# Patient Record
Sex: FEMALE | Race: WHITE | NOT HISPANIC OR LATINO | Employment: FULL TIME | URBAN - METROPOLITAN AREA
[De-identification: names, ages, dates, MRNs, and addresses within clinical notes are randomized per-mention and may not be internally consistent; named-entity substitution may affect disease eponyms.]

---

## 2017-07-12 ENCOUNTER — TRANSFERRED RECORDS (OUTPATIENT)
Dept: HEALTH INFORMATION MANAGEMENT | Facility: CLINIC | Age: 42
End: 2017-07-12

## 2017-09-15 DIAGNOSIS — E03.9 HYPOTHYROIDISM, UNSPECIFIED TYPE: ICD-10-CM

## 2017-09-15 RX ORDER — LEVOTHYROXINE SODIUM 175 UG/1
TABLET ORAL
Qty: 30 TABLET | Refills: 0 | Status: SHIPPED | OUTPATIENT
Start: 2017-09-15 | End: 2017-10-26

## 2017-09-15 NOTE — TELEPHONE ENCOUNTER
Medication is being filled for 1 time refill only due to:  Patient needs to be seen because due for physical and labs. Recent visits for acute issues..   Silvana MARES RN

## 2017-09-15 NOTE — TELEPHONE ENCOUNTER
levothyroxine (SYNTHROID/LEVOTHROID) 175 MCG tablet     Last Written Prescription Date: 02/14/17  Last Quantity: 90, # refills: 1  Last Office Visit with [facility], [facility] or [facility] prescribing provider: 10/26/16        TSH   Date Value Ref Range Status   09/14/2016 1.14 0.40 - 4.00 mU/L Final

## 2017-10-26 DIAGNOSIS — E03.9 HYPOTHYROIDISM, UNSPECIFIED TYPE: ICD-10-CM

## 2017-10-26 RX ORDER — LEVOTHYROXINE SODIUM 175 UG/1
TABLET ORAL
Qty: 30 TABLET | Refills: 0 | Status: SHIPPED | OUTPATIENT
Start: 2017-10-26 | End: 2017-11-21

## 2017-11-01 ENCOUNTER — TRANSFERRED RECORDS (OUTPATIENT)
Dept: FAMILY MEDICINE | Facility: CLINIC | Age: 42
End: 2017-11-01

## 2017-11-01 LAB — PAP SMEAR - HIM PATIENT REPORTED: NEGATIVE

## 2017-11-17 ENCOUNTER — OFFICE VISIT (OUTPATIENT)
Dept: FAMILY MEDICINE | Facility: CLINIC | Age: 42
End: 2017-11-17
Payer: COMMERCIAL

## 2017-11-17 VITALS
DIASTOLIC BLOOD PRESSURE: 72 MMHG | WEIGHT: 154.1 LBS | HEART RATE: 80 BPM | BODY MASS INDEX: 27.3 KG/M2 | SYSTOLIC BLOOD PRESSURE: 98 MMHG | HEIGHT: 63 IN | TEMPERATURE: 98.2 F

## 2017-11-17 DIAGNOSIS — Z00.00 ENCOUNTER FOR ROUTINE ADULT HEALTH EXAMINATION WITHOUT ABNORMAL FINDINGS: Primary | ICD-10-CM

## 2017-11-17 DIAGNOSIS — E03.9 HYPOTHYROIDISM, UNSPECIFIED TYPE: ICD-10-CM

## 2017-11-17 DIAGNOSIS — R05.9 COUGH: ICD-10-CM

## 2017-11-17 DIAGNOSIS — M30.0 POLYARTERITIS NODOSA (H): ICD-10-CM

## 2017-11-17 DIAGNOSIS — C67.9 MALIGNANT NEOPLASM OF URINARY BLADDER, UNSPECIFIED SITE (H): ICD-10-CM

## 2017-11-17 DIAGNOSIS — R82.90 NONSPECIFIC FINDING ON EXAMINATION OF URINE: ICD-10-CM

## 2017-11-17 DIAGNOSIS — L30.9 DERMATITIS: ICD-10-CM

## 2017-11-17 LAB
ALBUMIN UR-MCNC: NEGATIVE MG/DL
APPEARANCE UR: CLEAR
BACTERIA #/AREA URNS HPF: ABNORMAL /HPF
BILIRUB UR QL STRIP: NEGATIVE
COLOR UR AUTO: YELLOW
GLUCOSE UR STRIP-MCNC: NEGATIVE MG/DL
HGB UR QL STRIP: ABNORMAL
KETONES UR STRIP-MCNC: NEGATIVE MG/DL
LEUKOCYTE ESTERASE UR QL STRIP: ABNORMAL
NITRATE UR QL: NEGATIVE
NON-SQ EPI CELLS #/AREA URNS LPF: ABNORMAL /LPF
PH UR STRIP: 6 PH (ref 5–7)
RBC #/AREA URNS AUTO: ABNORMAL /HPF
SOURCE: ABNORMAL
SP GR UR STRIP: 1.02 (ref 1–1.03)
T3FREE SERPL-MCNC: 2.6 PG/ML (ref 2.3–4.2)
UROBILINOGEN UR STRIP-ACNC: 0.2 EU/DL (ref 0.2–1)
WBC #/AREA URNS AUTO: ABNORMAL /HPF

## 2017-11-17 PROCEDURE — 84481 FREE ASSAY (FT-3): CPT | Performed by: FAMILY MEDICINE

## 2017-11-17 PROCEDURE — 80061 LIPID PANEL: CPT | Performed by: FAMILY MEDICINE

## 2017-11-17 PROCEDURE — 81001 URINALYSIS AUTO W/SCOPE: CPT | Performed by: FAMILY MEDICINE

## 2017-11-17 PROCEDURE — 84443 ASSAY THYROID STIM HORMONE: CPT | Performed by: FAMILY MEDICINE

## 2017-11-17 PROCEDURE — 90471 IMMUNIZATION ADMIN: CPT | Performed by: FAMILY MEDICINE

## 2017-11-17 PROCEDURE — 82306 VITAMIN D 25 HYDROXY: CPT | Performed by: FAMILY MEDICINE

## 2017-11-17 PROCEDURE — 99396 PREV VISIT EST AGE 40-64: CPT | Mod: 25 | Performed by: FAMILY MEDICINE

## 2017-11-17 PROCEDURE — 84439 ASSAY OF FREE THYROXINE: CPT | Performed by: FAMILY MEDICINE

## 2017-11-17 PROCEDURE — 80053 COMPREHEN METABOLIC PANEL: CPT | Performed by: FAMILY MEDICINE

## 2017-11-17 PROCEDURE — 87086 URINE CULTURE/COLONY COUNT: CPT | Performed by: FAMILY MEDICINE

## 2017-11-17 PROCEDURE — 90686 IIV4 VACC NO PRSV 0.5 ML IM: CPT | Performed by: FAMILY MEDICINE

## 2017-11-17 PROCEDURE — 36415 COLL VENOUS BLD VENIPUNCTURE: CPT | Performed by: FAMILY MEDICINE

## 2017-11-17 PROCEDURE — 87088 URINE BACTERIA CULTURE: CPT | Performed by: FAMILY MEDICINE

## 2017-11-17 RX ORDER — CLOBETASOL PROPIONATE 0.5 MG/G
OINTMENT TOPICAL 2 TIMES DAILY
Qty: 60 G | Refills: 3 | Status: SHIPPED | OUTPATIENT
Start: 2017-11-17 | End: 2019-01-23

## 2017-11-17 RX ORDER — ALBUTEROL SULFATE 90 UG/1
2 AEROSOL, METERED RESPIRATORY (INHALATION) EVERY 6 HOURS PRN
Qty: 1 INHALER | Refills: 3 | Status: SHIPPED | OUTPATIENT
Start: 2017-11-17 | End: 2019-01-23

## 2017-11-17 ASSESSMENT — PATIENT HEALTH QUESTIONNAIRE - PHQ9: SUM OF ALL RESPONSES TO PHQ QUESTIONS 1-9: 12

## 2017-11-17 NOTE — PROGRESS NOTES
SUBJECTIVE:   CC: Audra Parada is an 42 year old woman who presents for preventive health visit.     Healthy Habits:    Do you get at least three servings of calcium containing foods daily (dairy, green leafy vegetables, etc.)? yes    Amount of exercise or daily activities, outside of work: 2-3 day(s) per week    Problems taking medications regularly No    Medication side effects: No    Have you had an eye exam in the past two years? yes    Do you see a dentist twice per year? yes    Do you have sleep apnea, excessive snoring or daytime drowsiness?no          -------------------------------------    Today's PHQ-2 Score:   PHQ-2 ( 1999 [employer]) 10/24/2016 1/5/2016   Q1: Little interest or pleasure in doing things 0 0   Q2: Feeling down, depressed or hopeless 0 0   PHQ-2 Score 0 0         Abuse: Current or Past(Physical, Sexual or Emotional)- NO  Do you feel safe in your environment - YES  Social History   Substance Use Topics     Smoking status: Never Smoker     Smokeless tobacco: Never Used     Alcohol use Yes      Comment: 3-5/ week     The patient does not drink >3 drinks per day nor >7 drinks per week.    Reviewed orders with patient.  Reviewed health maintenance and updated orders accordingly - Yes  Patient Active Problem List   Diagnosis     Hypothyroidism     Contact dermatitis and other eczema, due to unspecified cause     Abnormal glandular Papanicolaou smear of cervix     Polyarteritis nodosa (H)     Bladder cancer (H)     CARDIOVASCULAR SCREENING; LDL GOAL LESS THAN 160     Recurrent pregnancy loss     Past Surgical History:   Procedure Laterality Date     C INCISE/FULGUR LESN BLADDER  8/31/07    non-invasive Gr I bladder tumor     C NONSPECIFIC PROCEDURE  3/03    Colposcopy       Social History   Substance Use Topics     Smoking status: Never Smoker     Smokeless tobacco: Never Used     Alcohol use Yes      Comment: 3-5/ week     Family History   Problem Relation Age of Onset     DIABETES Father       "adult onset-using insulin     C.A.D. Maternal Grandmother      CABG at 63     C.A.D. Paternal Grandmother      MI--70's-80's     Alcohol/Drug Maternal Grandfather      Respiratory Paternal Grandfather      asthma     Hypertension Mother              Patient under age 50, mutual decision reflected in health maintenance.        Pertinent mammograms are reviewed under the imaging tab.  History of abnormal Pap smear: YES but patient gets her pap smear at [facility] - and is UTD    Reviewed and updated as needed this visit by clinical staffTobacco  Allergies  Meds  Problems         Reviewed and updated as needed this visit by Provider  Allergies  Meds  Problems              ROS:  C: NEGATIVE for fever, chills, change in weight  INTEGUMENTARY/SKIN: rash in groin  E: NEGATIVE for vision changes or irritation  ENT: NEGATIVE for ear, mouth and throat problems  R: NEGATIVE for significant cough or SOB  B: NEGATIVE for masses, tenderness or discharge  CV: NEGATIVE for chest pain, palpitations or peripheral edema  GI: NEGATIVE for nausea, abdominal pain, heartburn, or change in bowel habits  : NEGATIVE for unusual urinary or vaginal symptoms. Periods are regular.  M: NEGATIVE for significant arthralgias or myalgia  N: NEGATIVE for weakness, dizziness or paresthesias  P: NEGATIVE for changes in mood or affect    OBJECTIVE:   BP 98/72  Pulse 80  Temp 98.2  F (36.8  C) (Oral)  Ht 5' 3\" (1.6 m)  Wt 154 lb 1.6 oz (69.9 kg)  LMP 10/31/2017  BMI 27.3 kg/m2  EXAM:  GENERAL: healthy, alert and no distress  EYES: Eyes grossly normal to inspection, PERRL and conjunctivae and sclerae normal  HENT: ear canals and TM's normal, nose and mouth without ulcers or lesions  NECK: no adenopathy, no asymmetry, masses, or scars and thyroid normal to palpation  RESP: lungs clear to auscultation - no rales, rhonchi or wheezes  BREAST: normal without masses, tenderness or nipple discharge and no palpable axillary masses or " adenopathy  CV: regular rate and rhythm, normal S1 S2, no S3 or S4, no murmur, click or rub, no peripheral edema and peripheral pulses strong  ABDOMEN: soft, nontender, no hepatosplenomegaly, no masses and bowel sounds normal   (female):  Large area of moderate erythema and cracking of skin in the gluteal cleft extending to labia  MS: no gross musculoskeletal defects noted, no edema  SKIN: no suspicious lesions or rashes  NEURO: Normal strength and tone, mentation intact and speech normal  PSYCH: mentation appears normal, affect normal/bright    ASSESSMENT/PLAN:   1. Encounter for routine adult health examination without abnormal findings  Routine  - Vitamin D Deficiency  - HC FLU VAC PRESRV FREE QUAD SPLIT VIR 3+YRS IM  - Lipid panel reflex to direct LDL Fasting  - Comprehensive metabolic panel (BMP + Alb, Alk Phos, ALT, AST, Total. Bili, TP)    2. Hypothyroidism, unspecified type  Pending TSH - if in the 1-2 will refill the med but patient is having some depression symptoms - discussed starting wellbutrin 100mg SR for 4-6 weeks and then following up with me at visit  - TSH  - T4, free  - T3, Free  - Comprehensive metabolic panel (BMP + Alb, Alk Phos, ALT, AST, Total. Bili, TP)    3. Cough   refilled for prn use  - budesonide (PULMICORT FLEXHALER) 180 MCG/ACT inhaler; Inhale 2 puffs into the lungs 2 times daily  Dispense: 1 Inhaler; Refill: 1  - albuterol (PROAIR HFA/PROVENTIL HFA/VENTOLIN HFA) 108 (90 BASE) MCG/ACT Inhaler; Inhale 2 puffs into the lungs every 6 hours as needed for shortness of breath / dyspnea or wheezing  Dispense: 1 Inhaler; Refill: 3    4. Malignant neoplasm of urinary bladder, unspecified site (H)  Hx of bladder cancer - will check UA annually looking for microhematuria  - *UA reflex to Microscopic and Culture ([facility] (except [place] and [place])  - Comprehensive metabolic panel (BMP + Alb, Alk Phos, ALT, AST, Total. Bili, TP)    5. Polyarteritis nodosa (H)     -  "Comprehensive metabolic panel (BMP + Alb, Alk Phos, ALT, AST, Total. Bili, TP)    6. Dermatitis  Severe dermatitis in the perineal area extending from labia to the gluteal cleft   Will treat with clobetasol - advised derm biopsy  - clobetasol (TEMOVATE) 0.05 % ointment; Apply topically 2 times daily  Dispense: 60 g; Refill: 3    COUNSELING:   Reviewed preventive health counseling, as reflected in patient instructions         reports that she has never smoked. She has never used smokeless tobacco.    Estimated body mass index is 27.3 kg/(m^2) as calculated from the following:    Height as of this encounter: 5' 3\" (1.6 m).    Weight as of this encounter: 154 lb 1.6 oz (69.9 kg).   Weight management plan: Discussed healthy diet and exercise guidelines and patient will follow up in 12 months in clinic to re-evaluate.    Counseling Resources:  ATP IV Guidelines  Pooled Cohorts Equation Calculator  Breast Cancer Risk Calculator  FRAX Risk Assessment  ICSI Preventive Guidelines  Dietary Guidelines for Americans, 2010  USDA's MyPlate  ASA Prophylaxis  Lung CA Screening    Michelle Caldwell, DO  [facility]  "

## 2017-11-17 NOTE — NURSING NOTE
"Chief Complaint   Patient presents with     Physical     pt is fasting     BP 98/72  Pulse 80  Temp 98.2  F (36.8  C) (Oral)  Ht 5' 3\" (1.6 m)  Wt 154 lb 1.6 oz (69.9 kg)  LMP 10/31/2017  BMI 27.3 kg/m2 Estimated body mass index is 27.3 kg/(m^2) as calculated from the following:    Height as of this encounter: 5' 3\" (1.6 m).    Weight as of this encounter: 154 lb 1.6 oz (69.9 kg).  Medication Reconciliation: complete      Health Maintenance due pending provider review:  Pap Smear    [facility] to [facility], aware due    Cindy Long CMA  "

## 2017-11-17 NOTE — MR AVS SNAPSHOT
After Visit Summary   11/17/2017    Audra Parada    MRN: [id number]           Patient Information     Date Of Birth          1975        Visit Information        Provider Department      11/17/2017 8:30 AM Michelle Caldwell DO [facility]        Today's Diagnoses     Encounter for routine adult health examination without abnormal findings    -  1    Hypothyroidism, unspecified type        Cough        Malignant neoplasm of urinary bladder, unspecified site (H)        Polyarteritis nodosa (H)        Dermatitis        Nonspecific finding on examination of urine          Care Instructions      Preventive Health Recommendations  Female Ages 40 to 49    Yearly exam:     See your health care provider every year in order to  1. Review health changes.   2. Discuss preventive care.    3. Review your medicines if your doctor prescribed any.      Get a Pap test every three years (unless you have an abnormal result and your provider advises testing more often).      If you get Pap tests with HPV test, you only need to test every 5 years, unless you have an abnormal result. You do not need a Pap test if your uterus was removed (hysterectomy) and you have not had cancer.      You should be tested each year for STDs (sexually transmitted diseases), if you're at risk.       Ask your doctor if you should have a mammogram.      Have a colonoscopy (test for colon cancer) if someone in your family has had colon cancer or polyps before age 50.       Have a cholesterol test every 5 years.       Have a diabetes test (fasting glucose) after age 45. If you are at risk for diabetes, you should have this test every 3 years.    Shots: Get a flu shot each year. Get a tetanus shot every 10 years.     Nutrition:     Eat at least 5 servings of fruits and vegetables each day.    Eat whole-grain bread, whole-wheat pasta and brown rice instead of white grains and rice.    Talk to your provider about Calcium and Vitamin  "D.     Lifestyle    Exercise at least 150 minutes a week (an average of 30 minutes a day, 5 days a week). This will help you control your weight and prevent disease.    Limit alcohol to one drink per day.    No smoking.     Wear sunscreen to prevent skin cancer.    See your dentist every six months for an exam and cleaning.          Follow-ups after your visit        Who to contact     If you have questions or need follow up information about today's clinic visit or your schedule please contact [facility] directly at [phone].  Normal or non-critical lab and imaging results will be communicated to you by Liquiteriahart, letter or phone within 4 business days after the clinic has received the results. If you do not hear from us within 7 days, please contact the clinic through [employer] or phone. If you have a critical or abnormal lab result, we will notify you by phone as soon as possible.  Submit refill requests through [employer] or call your pharmacy and they will forward the refill request to us. Please allow 3 business days for your refill to be completed.          Additional Information About Your Visit        LiquiteriaharMoozey Information     [employer] gives you secure access to your electronic health record. If you see a primary care provider, you can also send messages to your care team and make appointments. If you have questions, please call your primary care clinic.  If you do not have a primary care provider, please call [phone] and they will assist you.        Care EveryWhere ID     This is your Care EveryWhere ID. This could be used by other organizations to access your [facility] medical records  [id number]        Your Vitals Were     Pulse Temperature Height Last Period BMI (Body Mass Index)       80 98.2  F (36.8  C) (Oral) 5' 3\" (1.6 m) 10/31/2017 27.3 kg/m2        Blood Pressure from Last 3 Encounters:   11/17/17 98/72   10/26/16 101/65   10/24/16 107/61    Weight from Last 3 Encounters:   11/17/17 " 154 lb 1.6 oz (69.9 kg)   10/26/16 144 lb (65.3 kg)   10/24/16 144 lb 8 oz (65.5 kg)              We Performed the Following     *UA reflex to Microscopic and Culture ([place] [facility] (except [place] and [place])     Comprehensive metabolic panel (BMP + Alb, Alk Phos, ALT, AST, Total. Bili, TP)     HC FLU VAC PRESRV FREE QUAD SPLIT VIR 3+YRS IM     Lipid panel reflex to direct LDL Fasting     T3, Free     T4, free     TSH     Urine Culture Aerobic Bacterial     Urine Microscopic     Vitamin D Deficiency          Today's Medication Changes          These changes are accurate as of: 11/17/17  4:51 PM.  If you have any questions, ask your nurse or doctor.               Start taking these medicines.        Dose/Directions    clobetasol 0.05 % ointment   Commonly known as:  TEMOVATE   Used for:  Dermatitis   Started by:  Michelle Caldwell DO        Apply topically 2 times daily   Quantity:  60 g   Refills:  3         These medicines have changed or have updated prescriptions.        Dose/Directions    VITAMIN D PO   This may have changed:  Another medication with the same name was removed. Continue taking this medication, and follow the directions you see here.   Changed by:  Michelle Caldwell DO        Refills:  0         Stop taking these medicines if you haven't already. Please contact your care team if you have questions.     ketoconazole 2 % cream   Commonly known as:  NIZORAL   Stopped by:  Michelle Caldwell DO           PRENATAL VITAMIN PO   Stopped by:  Michelle Caldwell DO                Where to get your medicines      These medications were sent to [facility] [id number] [place], [place] [address] [facility]  [address] [facility] [address], [facility] [address]     Phone:  [phone]     albuterol 108 (90 BASE) MCG/ACT Inhaler    budesonide 180 MCG/ACT inhaler    clobetasol 0.05 % ointment                Primary Care Provider Office Phone # Fax Galindo Caldwell DO  [phone]       [address] [facility]  [address]  [facility] [address]        Equal Access to Services     ALANIS BREWSTER : Hadii pamela salas vandana Cruz, wamaria elenada luqmurray, qajacquelineta kajadeda masoud, elkin hartman. So [facility] [phone].    ATENCIÓN: Si habla español, tiene a mcneil disposición servicios gratuitos de asistencia lingüística. Llame al [phone].    We comply with applicable federal civil rights laws and [place] laws. We do not discriminate on the basis of race, color, national origin, age, disability, sex, sexual orientation, or gender identity.            Thank you!     Thank you for choosing [facility]  for your care. Our goal is always to provide you with excellent care. Hearing back from our patients is one way we can continue to improve our services. Please take a few minutes to complete the written survey that you may receive in the mail after your visit with us. Thank you!             Your Updated Medication List - Protect others around you: Learn how to safely use, store and throw away your medicines at www.disposemymeds.org.          This list is accurate as of: 11/17/17  4:51 PM.  Always use your most recent med list.                   Brand Name Dispense Instructions for use Diagnosis    albuterol 108 (90 BASE) MCG/ACT Inhaler    PROAIR HFA/PROVENTIL HFA/VENTOLIN HFA    1 Inhaler    Inhale 2 puffs into the lungs every 6 hours as needed for shortness of breath / dyspnea or wheezing    Cough       budesonide 180 MCG/ACT inhaler    PULMICORT FLEXHALER    1 Inhaler    Inhale 2 puffs into the lungs 2 times daily    Cough       clobetasol 0.05 % ointment    TEMOVATE    60 g    Apply topically 2 times daily    Dermatitis       desonide 0.05 % cream    DESOWEN     Apply 0.5 inches topically. As directed        levothyroxine 175 MCG tablet    SYNTHROID/LEVOTHROID    30 tablet    TAKE 1 TABLET BY MOUTH EVERY DAY    Hypothyroidism, unspecified type        MULTIVITAMIN ADULT PO           OMEGA-3 FISH OIL PO           VITAMIN D PO

## 2017-11-18 LAB
ALBUMIN SERPL-MCNC: 3.7 G/DL (ref 3.4–5)
ALP SERPL-CCNC: 65 U/L (ref 40–150)
ALT SERPL W P-5'-P-CCNC: 47 U/L (ref 0–50)
ANION GAP SERPL CALCULATED.3IONS-SCNC: 10 MMOL/L (ref 3–14)
AST SERPL W P-5'-P-CCNC: 27 U/L (ref 0–45)
BACTERIA SPEC CULT: ABNORMAL
BACTERIA SPEC CULT: ABNORMAL
BILIRUB SERPL-MCNC: 0.3 MG/DL (ref 0.2–1.3)
BUN SERPL-MCNC: 17 MG/DL (ref 7–30)
CALCIUM SERPL-MCNC: 9.1 MG/DL (ref 8.5–10.1)
CHLORIDE SERPL-SCNC: 106 MMOL/L (ref 94–109)
CHOLEST SERPL-MCNC: 146 MG/DL
CO2 SERPL-SCNC: 21 MMOL/L (ref 20–32)
CREAT SERPL-MCNC: 0.73 MG/DL (ref 0.52–1.04)
GFR SERPL CREATININE-BSD FRML MDRD: 86 ML/MIN/1.7M2
GLUCOSE SERPL-MCNC: 76 MG/DL (ref 70–99)
HDLC SERPL-MCNC: 71 MG/DL
LDLC SERPL CALC-MCNC: 64 MG/DL
NONHDLC SERPL-MCNC: 75 MG/DL
POTASSIUM SERPL-SCNC: 4.3 MMOL/L (ref 3.4–5.3)
PROT SERPL-MCNC: 8.4 G/DL (ref 6.8–8.8)
SODIUM SERPL-SCNC: 137 MMOL/L (ref 133–144)
SPECIMEN SOURCE: ABNORMAL
T4 FREE SERPL-MCNC: 1.17 NG/DL (ref 0.76–1.46)
TRIGL SERPL-MCNC: 53 MG/DL
TSH SERPL DL<=0.005 MIU/L-ACNC: 1.51 MU/L (ref 0.4–4)

## 2017-11-20 ENCOUNTER — MYC MEDICAL ADVICE (OUTPATIENT)
Dept: FAMILY MEDICINE | Facility: CLINIC | Age: 42
End: 2017-11-20

## 2017-11-20 DIAGNOSIS — F34.1 DYSTHYMIA: Primary | ICD-10-CM

## 2017-11-20 DIAGNOSIS — E03.9 HYPOTHYROIDISM, UNSPECIFIED TYPE: ICD-10-CM

## 2017-11-20 LAB — DEPRECATED CALCIDIOL+CALCIFEROL SERPL-MC: 47 UG/L (ref 20–75)

## 2017-11-20 NOTE — PROGRESS NOTES
Dear Audra,   Your test results are all back -   -Liver and gallbladder tests are normal. (ALT,AST, Alk phos, bilirubin), kidney function is normal (Cr, GFR), Sodium is normal, Potassium is normal, Calcium is normal, Glucose is normal (diabetes screening test).   -Cholesterol levels (LDL,HDL, Triglycerides) are normal.  ADVISE: rechecking in 1 year.  -TSH (thyroid stimulating hormone) level is normal which indicates normal thyroid function.  -Vitamin D level is normal, 1000 IU daily in diet or supplements is recommended.   -Urine is normal.  The culture showed some Strep B but this is not an infection.  This is only concerning if you were pregnant.  Let us know if you have any questions.  -Michelle Caldwell, DO

## 2017-11-21 RX ORDER — BUPROPION HYDROCHLORIDE 100 MG/1
100 TABLET, EXTENDED RELEASE ORAL
Status: CANCELLED | OUTPATIENT
Start: 2017-11-21

## 2017-11-21 RX ORDER — LEVOTHYROXINE SODIUM 175 UG/1
175 TABLET ORAL DAILY
Qty: 90 TABLET | Refills: 3 | Status: SHIPPED | OUTPATIENT
Start: 2017-11-21 | End: 2018-12-05

## 2017-11-21 RX ORDER — BUPROPION HYDROCHLORIDE 100 MG/1
TABLET, EXTENDED RELEASE ORAL
Qty: 60 TABLET | Refills: 0 | Status: SHIPPED | OUTPATIENT
Start: 2017-11-21 | End: 2017-12-21

## 2017-11-21 NOTE — TELEPHONE ENCOUNTER
PN  Please see Eashmartt message below.  Note from 11/17 OV:  2. Hypothyroidism, unspecified type  Pending TSH - if in the 1-2 will refill the med but patient is having some depression symptoms - discussed starting wellbutrin 100mg SR for 4-6 weeks and then following up with me at visit    Thanks, Emerita Farrell RN

## 2017-11-28 ENCOUNTER — TRANSFERRED RECORDS (OUTPATIENT)
Dept: HEALTH INFORMATION MANAGEMENT | Facility: CLINIC | Age: 42
End: 2017-11-28

## 2017-12-02 ENCOUNTER — HEALTH MAINTENANCE LETTER (OUTPATIENT)
Age: 42
End: 2017-12-02

## 2017-12-21 ENCOUNTER — MYC MEDICAL ADVICE (OUTPATIENT)
Dept: FAMILY MEDICINE | Facility: CLINIC | Age: 42
End: 2017-12-21

## 2017-12-21 DIAGNOSIS — F34.1 DYSTHYMIA: ICD-10-CM

## 2017-12-22 RX ORDER — BUPROPION HYDROCHLORIDE 100 MG/1
100 TABLET, EXTENDED RELEASE ORAL 2 TIMES DAILY
Qty: 60 TABLET | Refills: 0 | Status: SHIPPED | OUTPATIENT
Start: 2017-12-22 | End: 2018-01-05

## 2017-12-22 NOTE — TELEPHONE ENCOUNTER
Prescription approved per [facility] Refill Protocol.  Silvana MARES RN    Requested Prescriptions   Pending Prescriptions Disp Refills     buPROPion (WELLBUTRIN SR) 100 MG 12 hr tablet [Pharmacy Med Name: BUPROPION SR 100MG TABLETS (12H)] 60 tablet 0     Sig: TAKE 1 TABLET BY MOUTH DAILY FOR 1 TO 2 WEEKS THEN INCREASE TO 1 TABLET TWICE DAILY    SSRIs Protocol Failed    12/21/2017  9:56 PM       Failed - PHQ-9 score less than 5 in past 6 months    The PHQ-9 criteria is meant to fail. It requires a PHQ-9 score review         Failed - Medication is NOT Bupropion    If the medication is Bupropion (Wellbutrin), and the patient is taking for smoking cessation; OK to refill.         Passed - Patient is age 18 or older       Passed - No active pregnancy on record       Passed - No positive pregnancy test in last 12 months       Passed - Recent (6 mo) or future visit with authorizing provider's specialty    Patient had office visit in the last 6 months or has a visit in the next 30 days with authorizing provider.  See chart review.             Next 5 appointments (look out 90 days)     Jan 05, 2018 12:15 PM CST   MyChart Short with Michelle Caldwell,    [facility]    [address] [facility] [address] [phone]

## 2018-01-05 ENCOUNTER — OFFICE VISIT (OUTPATIENT)
Dept: FAMILY MEDICINE | Facility: CLINIC | Age: 43
End: 2018-01-05
Payer: COMMERCIAL

## 2018-01-05 VITALS
TEMPERATURE: 97.5 F | OXYGEN SATURATION: 99 % | SYSTOLIC BLOOD PRESSURE: 114 MMHG | HEIGHT: 63 IN | DIASTOLIC BLOOD PRESSURE: 70 MMHG | HEART RATE: 89 BPM | BODY MASS INDEX: 27.46 KG/M2 | WEIGHT: 155 LBS

## 2018-01-05 DIAGNOSIS — F33.0 MILD EPISODE OF RECURRENT MAJOR DEPRESSIVE DISORDER (H): ICD-10-CM

## 2018-01-05 DIAGNOSIS — F41.1 GAD (GENERALIZED ANXIETY DISORDER): Primary | ICD-10-CM

## 2018-01-05 PROCEDURE — 99213 OFFICE O/P EST LOW 20 MIN: CPT | Performed by: FAMILY MEDICINE

## 2018-01-05 ASSESSMENT — ANXIETY QUESTIONNAIRES
2. NOT BEING ABLE TO STOP OR CONTROL WORRYING: MORE THAN HALF THE DAYS
IF YOU CHECKED OFF ANY PROBLEMS ON THIS QUESTIONNAIRE, HOW DIFFICULT HAVE THESE PROBLEMS MADE IT FOR YOU TO DO YOUR WORK, TAKE CARE OF THINGS AT HOME, OR GET ALONG WITH OTHER PEOPLE: SOMEWHAT DIFFICULT
5. BEING SO RESTLESS THAT IT IS HARD TO SIT STILL: NOT AT ALL
1. FEELING NERVOUS, ANXIOUS, OR ON EDGE: MORE THAN HALF THE DAYS
7. FEELING AFRAID AS IF SOMETHING AWFUL MIGHT HAPPEN: SEVERAL DAYS
6. BECOMING EASILY ANNOYED OR IRRITABLE: MORE THAN HALF THE DAYS
GAD7 TOTAL SCORE: 10
3. WORRYING TOO MUCH ABOUT DIFFERENT THINGS: MORE THAN HALF THE DAYS

## 2018-01-05 ASSESSMENT — PATIENT HEALTH QUESTIONNAIRE - PHQ9
SUM OF ALL RESPONSES TO PHQ QUESTIONS 1-9: 9
5. POOR APPETITE OR OVEREATING: SEVERAL DAYS

## 2018-01-05 NOTE — MR AVS SNAPSHOT
"              After Visit Summary   1/5/2018    Audra Parada    MRN: [id number]           Patient Information     Date Of Birth          1975        Visit Information        Provider Department      1/5/2018 12:15 PM Michelle Caldwell DO [facility]        Today's Diagnoses     DIANA (generalized anxiety disorder)    -  1    Mild episode of recurrent major depressive disorder (H)           Follow-ups after your visit        Who to contact     If you have questions or need follow up information about today's clinic visit or your schedule please contact [facility] directly at [phone].  Normal or non-critical lab and imaging results will be communicated to you by Spine Wavehart, letter or phone within 4 business days after the clinic has received the results. If you do not hear from us within 7 days, please contact the clinic through Tailoredt or phone. If you have a critical or abnormal lab result, we will notify you by phone as soon as possible.  Submit refill requests through Rapid Pathogen Screening or call your pharmacy and they will forward the refill request to us. Please allow 3 business days for your refill to be completed.          Additional Information About Your Visit        MyChart Information     Rapid Pathogen Screening gives you secure access to your electronic health record. If you see a primary care provider, you can also send messages to your care team and make appointments. If you have questions, please call your primary care clinic.  If you do not have a primary care provider, please call [phone] and they will assist you.        Care EveryWhere ID     This is your Care EveryWhere ID. This could be used by other organizations to access your [place] medical records  [id number]        Your Vitals Were     Pulse Temperature Height Last Period Pulse Oximetry BMI (Body Mass Index)    89 97.5  F (36.4  C) (Oral) 5' 3\" (1.6 m) 12/21/2017 99% 27.46 kg/m2       Blood Pressure from Last 3 Encounters:   01/05/18 " 114/70   11/17/17 98/72   10/26/16 101/65    Weight from Last 3 Encounters:   01/05/18 155 lb (70.3 kg)   11/17/17 154 lb 1.6 oz (69.9 kg)   10/26/16 144 lb (65.3 kg)              Today, you had the following     No orders found for display         Today's Medication Changes          These changes are accurate as of: 1/5/18  2:21 PM.  If you have any questions, ask your nurse or doctor.               Start taking these medicines.        Dose/Directions    sertraline 50 MG tablet   Commonly known as:  ZOLOFT   Used for:  DIANA (generalized anxiety disorder)   Started by:  Michelle Caldwell, DO        Take 1/2 tablet (25 mg) for 1-2 weeks, then increase to 1 tablet orally daily   Quantity:  30 tablet   Refills:  1            Where to get your medicines      [facility] [address] [place] [address] N AT [place] [address]  [address] [facility] [address]     Phone:  [phone]     sertraline 50 MG tablet                Primary Care Provider Office Phone # Fax #    Michelle Caldwell [phone] [address] [address]        Equal Access to Services     ALANIS BREWSTER AH: Hadii pamela ku hadasho Soomaali, waaxda luqadaha, qaybta kaalmada adeegyada, elkin webb hayaan ene watt . So [facility] [phone].    ATENCIÓN: Si habla español, tiene a mcneil disposición servicios gratuitos de asistencia lingüística. Llame al [phone].    We comply with applicable federal civil rights laws and [place] laws. We do not discriminate on the basis of race, color, national origin, age, disability, sex, sexual orientation, or gender identity.            Thank you!     Thank you for choosing [facility]  for your care. Our goal is always to provide you with excellent care. Hearing back from our patients is one way we can continue to improve our services. Please take a few minutes to complete the written survey that  you may receive in the mail after your visit with us. Thank you!             Your Updated Medication List - Protect others around you: Learn how to safely use, store and throw away your medicines at www.Curisemymeds.org.          This list is accurate as of: 1/5/18  2:21 PM.  Always use your most recent med list.                   Brand Name Dispense Instructions for use Diagnosis    albuterol 108 (90 BASE) MCG/ACT Inhaler    PROAIR HFA/PROVENTIL HFA/VENTOLIN HFA    1 Inhaler    Inhale 2 puffs into the lungs every 6 hours as needed for shortness of breath / dyspnea or wheezing    Cough       budesonide 180 MCG/ACT inhaler    PULMICORT FLEXHALER    1 Inhaler    Inhale 2 puffs into the lungs 2 times daily    Cough       clobetasol 0.05 % ointment    TEMOVATE    60 g    Apply topically 2 times daily    Dermatitis       desonide 0.05 % cream    DESOWEN     Apply 0.5 inches topically. As directed        levothyroxine 175 MCG tablet    SYNTHROID/LEVOTHROID    90 tablet    Take 1 tablet (175 mcg) by mouth daily    Hypothyroidism, unspecified type       MULTIVITAMIN ADULT PO           OMEGA-3 FISH OIL PO           sertraline 50 MG tablet    ZOLOFT    30 tablet    Take 1/2 tablet (25 mg) for 1-2 weeks, then increase to 1 tablet orally daily    DIANA (generalized anxiety disorder)       VITAMIN D PO

## 2018-01-05 NOTE — PROGRESS NOTES
SUBJECTIVE:   Audra Parada is a 42 year old female who presents to clinic today for the following health issues:      Depression Followup    Status since last visit: no significant change--some missed 2nd doses 2-3 x week    See PHQ-9 for current symptoms.  Other associated symptoms: None    Complicating factors:   Significant life event:  No   Current substance abuse:  None  Anxiety or Panic symptoms:  Yes-  Some anxiety issues    PHQ-9 Score and MyChart F/U Questions 11/17/2017   Total Score 12   Q9: Suicide Ideation Not at all     In the past two weeks have you had thoughts of suicide or self-harm?  No.    Do you have concerns about your personal safety or the safety of others?   No    PHQ-9  English  PHQ-9   Any Language  Suicide Assessment Five-step Evaluation and Treatment (SAFE-T)      Amount of exercise or physical activity: 3 x week    Problems taking medications regularly: No    Medication side effects: none    Diet: regular (no restrictions)      Started on wellbutrin -   Initially once a day - and then increased to one twice a day but is forgetting about 40% of the second dose  Side effects - initially nausea and decrease appetite     Still waking most nights at 4am and brain spirals -   Seems like her symptoms are worse - more obsessing about thoughts    Had tried wellbutrin 10 years ago and it seemed to work well   Went off in 2007    -------------------------------------    Problem list and histories reviewed & adjusted, as indicated.  Additional history: as documented    Patient Active Problem List   Diagnosis     Hypothyroidism     Contact dermatitis and other eczema, due to unspecified cause     Abnormal glandular Papanicolaou smear of cervix     Polyarteritis nodosa (H)     Bladder cancer (H)     CARDIOVASCULAR SCREENING; LDL GOAL LESS THAN 160     Recurrent pregnancy loss     DIANA (generalized anxiety disorder)     Past Surgical History:   Procedure Laterality Date     C INCISE/FULGUR ROLDAN  "BLADDER  8/31/07    non-invasive Gr I bladder tumor     C NONSPECIFIC PROCEDURE  3/03    Colposcopy       Social History   Substance Use Topics     Smoking status: Never Smoker     Smokeless tobacco: Never Used     Alcohol use Yes      Comment: 3-5/ week     Family History   Problem Relation Age of Onset     DIABETES Father      adult onset-using insulin     C.A.D. Maternal Grandmother      CABG at 63     C.A.D. Paternal Grandmother      MI--70's-80's     Alcohol/Drug Maternal Grandfather      Respiratory Paternal Grandfather      asthma     Hypertension Mother              Reviewed and updated as needed this visit by clinical staffTobacco  Allergies  Meds  Problems  Med Hx  Surg Hx  Fam Hx  Soc Hx        Reviewed and updated as needed this visit by Provider  Allergies  Meds  Problems         ROS:  Constitutional, HEENT, cardiovascular, pulmonary, gi and gu systems are negative, except as otherwise noted.      OBJECTIVE:   /70  Pulse 89  Temp 97.5  F (36.4  C) (Oral)  Ht 5' 3\" (1.6 m)  Wt 155 lb (70.3 kg)  LMP 12/21/2017  SpO2 99%  BMI 27.46 kg/m2  Body mass index is 27.46 kg/(m^2).  GENERAL: healthy, alert and no distress  PSYCH: mentation appears normal, affect normal/bright    Diagnostic Test Results:  none     ASSESSMENT/PLAN:     1. DIANA (generalized anxiety disorder)  DIANA - with some depression  The wellbutrin does not seem to be helping  Plan to wean off and switch to zoloft due to some obsessing about things -   Will titrate dose up - plan to check back via Telephone Visit in 6 weeks  Discussed potential side effects   - sertraline (ZOLOFT) 50 MG tablet; Take 1/2 tablet (25 mg) for 1-2 weeks, then increase to 1 tablet orally daily  Dispense: 30 tablet; Refill: 1    2. Mild episode of recurrent major depressive disorder (H)   as above    Pull in right groin last night doing leg lifts  Discussed rest - could be pulled muscle vs sports hernia      Pt will call or RTC if symptoms worsen or " do not improve.     Michelle Caldwell, DO  [facility]

## 2018-01-05 NOTE — NURSING NOTE
"Chief Complaint   Patient presents with     Depression     /70  Pulse 89  Temp 97.5  F (36.4  C) (Oral)  Ht 5' 3\" (1.6 m)  Wt 155 lb (70.3 kg)  LMP 12/21/2017  SpO2 99%  BMI 27.46 kg/m2 Estimated body mass index is 27.46 kg/(m^2) as calculated from the following:    Height as of this encounter: 5' 3\" (1.6 m).    Weight as of this encounter: 155 lb (70.3 kg).  Medication Reconciliation: complete      Health Maintenance due pending provider review:  NONE    n/a    Cindy Long CMA  "

## 2018-01-06 ASSESSMENT — ANXIETY QUESTIONNAIRES: GAD7 TOTAL SCORE: 10

## 2018-02-08 ENCOUNTER — TELEPHONE (OUTPATIENT)
Dept: FAMILY MEDICINE | Facility: CLINIC | Age: 43
End: 2018-02-08

## 2018-02-08 NOTE — TELEPHONE ENCOUNTER
Called patient scheduled a Telephone visit for 2/14/18. Also scheduled her a appt tomorrow 2/9/18 for [facility] with Dr. Huff.  [facility] Unit Coordinator

## 2018-02-08 NOTE — TELEPHONE ENCOUNTER
Reason for Call:  Other appointment-Telephone Visit    Detailed comments: patient would like to schedule a telephone visit with Dr. Caldwell as a follow up to new medication patient was put on.   If possible, sometime tomorrow would work well.    Phone Number Patient can be reached at: Home number on file [phone] (home)    Best Time: anytime    Can we leave a detailed message on this number? YES    Call taken on 2/8/2018 at 9:42 AM by Lynsey Teague  .

## 2018-02-14 ENCOUNTER — VIRTUAL VISIT (OUTPATIENT)
Dept: FAMILY MEDICINE | Facility: CLINIC | Age: 43
End: 2018-02-14
Payer: COMMERCIAL

## 2018-02-14 DIAGNOSIS — F41.1 GAD (GENERALIZED ANXIETY DISORDER): ICD-10-CM

## 2018-02-14 PROCEDURE — 99441 ZZC PHYSICIAN TELEPHONE EVALUATION 5-10 MIN: CPT | Performed by: FAMILY MEDICINE

## 2018-02-14 ASSESSMENT — ANXIETY QUESTIONNAIRES
2. NOT BEING ABLE TO STOP OR CONTROL WORRYING: SEVERAL DAYS
7. FEELING AFRAID AS IF SOMETHING AWFUL MIGHT HAPPEN: SEVERAL DAYS
IF YOU CHECKED OFF ANY PROBLEMS ON THIS QUESTIONNAIRE, HOW DIFFICULT HAVE THESE PROBLEMS MADE IT FOR YOU TO DO YOUR WORK, TAKE CARE OF THINGS AT HOME, OR GET ALONG WITH OTHER PEOPLE: SOMEWHAT DIFFICULT
1. FEELING NERVOUS, ANXIOUS, OR ON EDGE: SEVERAL DAYS
GAD7 TOTAL SCORE: 5
3. WORRYING TOO MUCH ABOUT DIFFERENT THINGS: SEVERAL DAYS
6. BECOMING EASILY ANNOYED OR IRRITABLE: SEVERAL DAYS
5. BEING SO RESTLESS THAT IT IS HARD TO SIT STILL: NOT AT ALL

## 2018-02-14 ASSESSMENT — PATIENT HEALTH QUESTIONNAIRE - PHQ9: 5. POOR APPETITE OR OVEREATING: NOT AT ALL

## 2018-02-14 NOTE — PROGRESS NOTES
"Audra Parada is a 42 year old female who is being evaluated via a billable telephone visit.      The patient has been notified of following:     \"This telephone visit will be conducted via a call between you and your physician/provider. We have found that certain health care needs can be provided without the need for a physical exam.  This service lets us provide the care you need with a short phone conversation.  If a prescription is necessary we can send it directly to your pharmacy.  If lab work is needed we can place an order for that and you can then stop by our lab to have the test done at a later time.    We will bill your insurance company for this service.  Please check with your medical insurance if this type of visit is covered. You may be responsible for the cost of this type of visit if insurance coverage is denied.  The typical cost is $30 (10min), $59 (11-20min) and $85 (21-30min).  Most often these visits are shorter than 10 minutes.    If during the course of the call the physician/provider feels a telephone visit is not appropriate, you will not be charged for this service.\"       Consent has been obtained for this service by care team member: yes. See the scanned image in the medical record.  SUBJECTIVE:     Audra Parada complains of  No chief complaint on file.      I have reviewed and updated the patient's Past Medical History, Social History, Family History and Medication List.    ALLERGIES  No known drug allergies      Additional provider notes:   Anxiety Follow-Up    Status since last visit: Improved n the sertraline    Other associated symptoms:None    Complicating factors:   Significant life event: No   Current substance abuse: None  Depression symptoms: No  DIANA-7 SCORE 1/5/2018 2/14/2018   Total Score 10 5       DIANA-7  Started with 1/2 tab;et and increased to 1 tablet for the past month  Taking zoloft and is making her tired - so just takes at bedtime  No other side " effects    OBJECTIVE:     1. DIANA (generalized anxiety disorder)  Discussed taking for 9-12 months since this is the second time taking meds for anxiety  Will reevaluate next year  Discussed signs and symptoms to monitor for  DIANA and PHQ9 are significantly improved  Pt will call or RTC if symptoms worsen or do not improve.   - sertraline (ZOLOFT) 50 MG tablet; Take 1 tablet (50 mg) by mouth daily  Dispense: 90 tablet; Refill: 3        I have reviewed the note as documented above.  This accurately captures the substance of my conversation with the patient,  Audra Parada     Total time of call between patient and provider was 9 minutes     Ruba Cornelius, CMA

## 2018-02-14 NOTE — MR AVS SNAPSHOT
After Visit Summary   2/14/2018    Audra Parada    MRN: [id number]           Patient Information     Date Of Birth          1975        Visit Information        Provider Department      2/14/2018 9:15 AM Michelle Caldwell, DO [facility]        Today's Diagnoses     DIANA (generalized anxiety disorder)           Follow-ups after your visit        Who to contact     If you have questions or need follow up information about today's clinic visit or your schedule please contact [facility] directly at [phone].  Normal or non-critical lab and imaging results will be communicated to you by Startlocalhart, letter or phone within 4 business days after the clinic has received the results. If you do not hear from us within 7 days, please contact the clinic through Sportubet or phone. If you have a critical or abnormal lab result, we will notify you by phone as soon as possible.  Submit refill requests through InterEx or call your pharmacy and they will forward the refill request to us. Please allow 3 business days for your refill to be completed.          Additional Information About Your Visit        MyChart Information     InterEx gives you secure access to your electronic health record. If you see a primary care provider, you can also send messages to your care team and make appointments. If you have questions, please call your primary care clinic.  If you do not have a primary care provider, please call [phone] and they will assist you.        Care EveryWhere ID     This is your Care EveryWhere ID. This could be used by other organizations to access your [place] medical records  [id number]         Blood Pressure from Last 3 Encounters:   01/05/18 114/70   11/17/17 98/72   10/26/16 101/65    Weight from Last 3 Encounters:   01/05/18 155 lb (70.3 kg)   11/17/17 154 lb 1.6 oz (69.9 kg)   10/26/16 144 lb (65.3 kg)              Today, you had the following     No orders found for  display         Today's Medication Changes          These changes are accurate as of 2/14/18 10:09 AM.  If you have any questions, ask your nurse or doctor.               These medicines have changed or have updated prescriptions.        Dose/Directions    sertraline 50 MG tablet   Commonly known as:  ZOLOFT   This may have changed:    - how much to take  - how to take this  - when to take this  - additional instructions   Used for:  DIANA (generalized anxiety disorder)        Dose:  50 mg   Take 1 tablet (50 mg) by mouth daily   Quantity:  90 tablet   Refills:  3            Where to get your medicines      These medications were sent to [facility] [address] [place], [place] - [facility] [place] [address]  [address], [facility] [address]     Phone:  [phone]     sertraline 50 MG tablet                Primary Care Provider Office Phone # Fax #    Michelle PEREZ DO [facility] [phone] [address] [address]        Equal Access to Services     CARLINE BREWSTER : Hadii aad ku hadasho Soomaali, waaxda luqadaha, qaybta kaalmada adeegyada, waxay idiin hayaan ene khcher watt . So [facility] [phone].    ATENCIÓN: Si habla español, tiene a mcneil disposición servicios gratuitos de asistencia lingüística. Anayeli al [phone].    We comply with applicable federal civil rights laws and [place] laws. We do not discriminate on the basis of race, color, national origin, age, disability, sex, sexual orientation, or gender identity.            Thank you!     Thank you for choosing [facility]  for your care. Our goal is always to provide you with excellent care. Hearing back from our patients is one way we can continue to improve our services. Please take a few minutes to complete the written survey that you may receive in the mail after your visit with us. Thank you!             Your Updated Medication List - Protect others around you: Learn how to  safely use, store and throw away your medicines at www.disposemymeds.org.          This list is accurate as of 2/14/18 10:09 AM.  Always use your most recent med list.                   Brand Name Dispense Instructions for use Diagnosis    albuterol 108 (90 BASE) MCG/ACT Inhaler    PROAIR HFA/PROVENTIL HFA/VENTOLIN HFA    1 Inhaler    Inhale 2 puffs into the lungs every 6 hours as needed for shortness of breath / dyspnea or wheezing    Cough       budesonide 180 MCG/ACT inhaler    PULMICORT FLEXHALER    1 Inhaler    Inhale 2 puffs into the lungs 2 times daily    Cough       clobetasol 0.05 % ointment    TEMOVATE    60 g    Apply topically 2 times daily    Dermatitis       desonide 0.05 % cream    DESOWEN     Apply 0.5 inches topically. As directed        levothyroxine 175 MCG tablet    SYNTHROID/LEVOTHROID    90 tablet    Take 1 tablet (175 mcg) by mouth daily    Hypothyroidism, unspecified type       MULTIVITAMIN ADULT PO           OMEGA-3 FISH OIL PO           sertraline 50 MG tablet    ZOLOFT    90 tablet    Take 1 tablet (50 mg) by mouth daily    DIANA (generalized anxiety disorder)       VITAMIN D PO

## 2018-02-15 ASSESSMENT — ANXIETY QUESTIONNAIRES: GAD7 TOTAL SCORE: 5

## 2018-02-15 ASSESSMENT — PATIENT HEALTH QUESTIONNAIRE - PHQ9: SUM OF ALL RESPONSES TO PHQ QUESTIONS 1-9: 4

## 2018-03-18 ENCOUNTER — TRANSFERRED RECORDS (OUTPATIENT)
Dept: HEALTH INFORMATION MANAGEMENT | Facility: CLINIC | Age: 43
End: 2018-03-18

## 2018-05-07 ENCOUNTER — MYC MEDICAL ADVICE (OUTPATIENT)
Dept: FAMILY MEDICINE | Facility: CLINIC | Age: 43
End: 2018-05-07

## 2018-05-07 DIAGNOSIS — J45.21 MILD INTERMITTENT ASTHMA WITH ACUTE EXACERBATION: Primary | ICD-10-CM

## 2018-09-21 ENCOUNTER — TELEPHONE (OUTPATIENT)
Dept: FAMILY MEDICINE | Facility: CLINIC | Age: 43
End: 2018-09-21

## 2018-09-21 NOTE — LETTER
September 21, 2018    Audra Parada  [address]  [place] [address]      Dear Letty Zhu cares about your health and your health plan.  I have reviewed your medical conditions, medication list and lab results, and am making recommendations based on this review to better manage your health.    You are in particular need of attention regarding:  -Asthma  -Depression/Anxiety  -Cervical Cancer Screening  -Wellness (Physical) Visit     I am recommending that you:     -schedule a WELLNESS (Physical) APPOINTMENT with me.   I will check fasting labs the same day - nothing to eat except water and meds for 8-10 hours prior.      -schedule a PAP SMEAR EXAM which is due.  Please disregard this reminder if you have had this exam elsewhere within the last year.  It would be helpful for us to have a copy of your recent pap smear report in our file so that we can best coordinate your care.    If you are under/uninsured, we recommend you contact the [facility]. They offer pap smears at no charge or on a sliding fee charge. You can schedule with them at [phone]. Please have them send us the results.      Please call us at the [facility]: [phone] or use 3scale to address the above recommendations.     Thank you for trusting [facility].  We appreciate the opportunity to serve you and look forward to supporting your healthcare in the future.    If you have (or plan to have) any of these tests done at a facility other than a [facility] or a [facility], please have the results sent to the [facility].         Healthy Regards,      Hailey Caldwell, DO

## 2018-12-05 DIAGNOSIS — E03.9 HYPOTHYROIDISM, UNSPECIFIED TYPE: ICD-10-CM

## 2018-12-06 RX ORDER — LEVOTHYROXINE SODIUM 175 UG/1
TABLET ORAL
Qty: 30 TABLET | Refills: 0 | Status: SHIPPED | OUTPATIENT
Start: 2018-12-06 | End: 2019-01-04

## 2018-12-06 NOTE — TELEPHONE ENCOUNTER
"Medication is being filled for 1 time refill only due to:  Patient needs to be seen because due for physical and TSH lab.   Silvana MARES RN    Requested Prescriptions   Pending Prescriptions Disp Refills     levothyroxine (SYNTHROID/LEVOTHROID) 175 MCG tablet [Pharmacy Med Name: LEVOTHYROXINE 0.175MG (175MCG)TABS] 90 tablet 0     Sig: TAKE 1 TABLET(175 MCG) BY MOUTH DAILY    Thyroid Protocol Failed    12/5/2018  4:53 PM       Failed - Normal TSH on file in past 12 months    Recent Labs   Lab Test  11/17/17   0913   TSH  1.51             Passed - Patient is 12 years or older       Passed - Recent (12 mo) or future (30 days) visit within the authorizing provider's specialty    Patient had office visit in the last 12 months or has a visit in the next 30 days with authorizing provider or within the authorizing provider's specialty.  See \"Patient Info\" tab in inbasket, or \"Choose Columns\" in Meds & Orders section of the refill encounter.             Passed - No active pregnancy on record    If patient is pregnant or has had a positive pregnancy test, please check TSH.         Passed - No positive pregnancy test in past 12 months    If patient is pregnant or has had a positive pregnancy test, please check TSH.                "

## 2019-01-04 DIAGNOSIS — E03.9 HYPOTHYROIDISM, UNSPECIFIED TYPE: ICD-10-CM

## 2019-01-07 NOTE — TELEPHONE ENCOUNTER
"Last physical and TSH 11/17/2017  1 month Rx sent 12/6/2018  Sent Donte to pt - due for physical  Silvana MARES RN    Requested Prescriptions   Pending Prescriptions Disp Refills     levothyroxine (SYNTHROID/LEVOTHROID) 175 MCG tablet [Pharmacy Med Name: LEVOTHYROXINE 0.175MG (175MCG)TABS] 30 tablet 0     Sig: TAKE 1 TABLET BY MOUTH EVERY DAY    Thyroid Protocol Failed - 1/4/2019  4:51 PM       Failed - Normal TSH on file in past 12 months    Recent Labs   Lab Test 11/17/17  0913   TSH 1.51             Passed - Patient is 12 years or older       Passed - Recent (12 mo) or future (30 days) visit within the authorizing provider's specialty    Patient had office visit in the last 12 months or has a visit in the next 30 days with authorizing provider or within the authorizing provider's specialty.  See \"Patient Info\" tab in inbasket, or \"Choose Columns\" in Meds & Orders section of the refill encounter.             Passed - Medication is active on med list       Passed - No active pregnancy on record    If patient is pregnant or has had a positive pregnancy test, please check TSH.         Passed - No positive pregnancy test in past 12 months    If patient is pregnant or has had a positive pregnancy test, please check TSH.                "

## 2019-01-08 RX ORDER — LEVOTHYROXINE SODIUM 175 UG/1
TABLET ORAL
Qty: 30 TABLET | Refills: 0 | Status: SHIPPED | OUTPATIENT
Start: 2019-01-08 | End: 2019-01-29

## 2019-01-08 NOTE — TELEPHONE ENCOUNTER
Next 5 appointments (look out 90 days)    Jan 23, 2019 12:30 PM CST  MyChart Physical Adult with Michelle Caldwell DO  [facility] ([facility] [address]  [facility] [id number] [phone]        Medication is being filled for 1 time refill only due to:  upcoming appt   Silvana MARES RN

## 2019-01-23 ENCOUNTER — OFFICE VISIT (OUTPATIENT)
Dept: FAMILY MEDICINE | Facility: CLINIC | Age: 44
End: 2019-01-23
Payer: COMMERCIAL

## 2019-01-23 VITALS
BODY MASS INDEX: 29.78 KG/M2 | HEART RATE: 68 BPM | SYSTOLIC BLOOD PRESSURE: 120 MMHG | DIASTOLIC BLOOD PRESSURE: 75 MMHG | WEIGHT: 168.13 LBS | TEMPERATURE: 98 F | OXYGEN SATURATION: 99 %

## 2019-01-23 DIAGNOSIS — Z23 VACCINE FOR SINGLE BACTERIAL DISEASE: ICD-10-CM

## 2019-01-23 DIAGNOSIS — F41.1 GAD (GENERALIZED ANXIETY DISORDER): ICD-10-CM

## 2019-01-23 DIAGNOSIS — N91.2 AMENORRHEA: ICD-10-CM

## 2019-01-23 DIAGNOSIS — E03.9 HYPOTHYROIDISM, UNSPECIFIED TYPE: ICD-10-CM

## 2019-01-23 DIAGNOSIS — M30.0 POLYARTERITIS NODOSA (H): ICD-10-CM

## 2019-01-23 DIAGNOSIS — J45.21 MILD INTERMITTENT ASTHMA WITH ACUTE EXACERBATION: ICD-10-CM

## 2019-01-23 DIAGNOSIS — R05.9 COUGH: ICD-10-CM

## 2019-01-23 DIAGNOSIS — L30.9 DERMATITIS: Primary | ICD-10-CM

## 2019-01-23 DIAGNOSIS — C67.9 MALIGNANT NEOPLASM OF URINARY BLADDER, UNSPECIFIED SITE (H): ICD-10-CM

## 2019-01-23 LAB
BETA HCG QUAL IFA URINE: NEGATIVE
FSH SERPL-ACNC: 2.6 IU/L

## 2019-01-23 PROCEDURE — 84703 CHORIONIC GONADOTROPIN ASSAY: CPT | Performed by: FAMILY MEDICINE

## 2019-01-23 PROCEDURE — 80053 COMPREHEN METABOLIC PANEL: CPT | Performed by: FAMILY MEDICINE

## 2019-01-23 PROCEDURE — 36415 COLL VENOUS BLD VENIPUNCTURE: CPT | Performed by: FAMILY MEDICINE

## 2019-01-23 PROCEDURE — 99214 OFFICE O/P EST MOD 30 MIN: CPT | Mod: 25 | Performed by: FAMILY MEDICINE

## 2019-01-23 PROCEDURE — 90471 IMMUNIZATION ADMIN: CPT | Performed by: FAMILY MEDICINE

## 2019-01-23 PROCEDURE — 90472 IMMUNIZATION ADMIN EACH ADD: CPT | Performed by: FAMILY MEDICINE

## 2019-01-23 PROCEDURE — 90686 IIV4 VACC NO PRSV 0.5 ML IM: CPT | Performed by: FAMILY MEDICINE

## 2019-01-23 PROCEDURE — 84443 ASSAY THYROID STIM HORMONE: CPT | Performed by: FAMILY MEDICINE

## 2019-01-23 PROCEDURE — 90732 PPSV23 VACC 2 YRS+ SUBQ/IM: CPT | Performed by: FAMILY MEDICINE

## 2019-01-23 PROCEDURE — 83001 ASSAY OF GONADOTROPIN (FSH): CPT | Performed by: FAMILY MEDICINE

## 2019-01-23 RX ORDER — ALBUTEROL SULFATE 90 UG/1
2 AEROSOL, METERED RESPIRATORY (INHALATION) EVERY 6 HOURS PRN
Qty: 1 INHALER | Refills: 3 | Status: SHIPPED | OUTPATIENT
Start: 2019-01-23 | End: 2021-08-31

## 2019-01-23 RX ORDER — TRIAMCINOLONE ACETONIDE 1 MG/G
OINTMENT TOPICAL 2 TIMES DAILY
Qty: 80 G | Refills: 1 | Status: SHIPPED | OUTPATIENT
Start: 2019-01-23 | End: 2020-04-10

## 2019-01-23 RX ORDER — LEVOTHYROXINE SODIUM 175 UG/1
175 TABLET ORAL DAILY
Qty: 30 TABLET | Refills: 0 | Status: CANCELLED | OUTPATIENT
Start: 2019-01-23

## 2019-01-23 RX ORDER — TRIAMCINOLONE ACETONIDE 1 MG/G
OINTMENT TOPICAL 2 TIMES DAILY
COMMUNITY
End: 2019-05-31

## 2019-01-23 RX ORDER — CHOLECALCIFEROL (VITAMIN D3) 50 MCG
1 TABLET ORAL DAILY
COMMUNITY

## 2019-01-23 ASSESSMENT — ANXIETY QUESTIONNAIRES
2. NOT BEING ABLE TO STOP OR CONTROL WORRYING: NOT AT ALL
1. FEELING NERVOUS, ANXIOUS, OR ON EDGE: SEVERAL DAYS
IF YOU CHECKED OFF ANY PROBLEMS ON THIS QUESTIONNAIRE, HOW DIFFICULT HAVE THESE PROBLEMS MADE IT FOR YOU TO DO YOUR WORK, TAKE CARE OF THINGS AT HOME, OR GET ALONG WITH OTHER PEOPLE: SOMEWHAT DIFFICULT
7. FEELING AFRAID AS IF SOMETHING AWFUL MIGHT HAPPEN: NOT AT ALL
3. WORRYING TOO MUCH ABOUT DIFFERENT THINGS: SEVERAL DAYS
GAD7 TOTAL SCORE: 3
5. BEING SO RESTLESS THAT IT IS HARD TO SIT STILL: NOT AT ALL
6. BECOMING EASILY ANNOYED OR IRRITABLE: SEVERAL DAYS

## 2019-01-23 ASSESSMENT — PAIN SCALES - GENERAL: PAINLEVEL: NO PAIN (0)

## 2019-01-23 ASSESSMENT — PATIENT HEALTH QUESTIONNAIRE - PHQ9
SUM OF ALL RESPONSES TO PHQ QUESTIONS 1-9: 1
5. POOR APPETITE OR OVEREATING: NOT AT ALL

## 2019-01-23 NOTE — PROGRESS NOTES
SUBJECTIVE:   Audra Parada is a 43 year old female who presents to clinic today for the following health issues:      Anxiety Followup    Status since last visit: Stable     See PHQ-9 for current symptoms.  Other associated symptoms: None    Complicating factors:   Significant life event:  No   Current substance abuse:  None  Anxiety or Panic symptoms:  No    PHQ 1/5/2018 2/14/2018 1/23/2019   PHQ-9 Total Score 9 4 1   Q9: Suicide Ideation Not at all Not at all Not at all     In the past two weeks have you had thoughts of suicide or self-harm?  No.    Do you have concerns about your personal safety or the safety of others?   No  PHQ-9  English  PHQ-9   Any Language  Suicide Assessment Five-step Evaluation and Treatment (SAFE-T)    Amount of exercise or physical activity: None    Problems taking medications regularly: No    Medication side effects: none    Diet: regular (no restrictions)      Thyroid -   Feels good -   Some weight gain last few months  No constipation  Some dry skin and dry hair  No period since December 1st -   Cycles changed in the past year  Had tubal ligation      Vaginal eczema -   Uses triamcinolone -   Has allergy to MCI preservative  Didn't resolve     Zoloft    Taking 50mg day  No side effects -   Started          Problem list and histories reviewed & adjusted, as indicated.  Additional history: as documented    Patient Active Problem List   Diagnosis     Hypothyroidism     Contact dermatitis and other eczema, due to unspecified cause     Abnormal glandular Papanicolaou smear of cervix     Polyarteritis nodosa (H)     Bladder cancer (H)     CARDIOVASCULAR SCREENING; LDL GOAL LESS THAN 160     Recurrent pregnancy loss     DIANA (generalized anxiety disorder)     Mild intermittent asthma with acute exacerbation     Past Surgical History:   Procedure Laterality Date     C INCISE/FULGUR LESN BLADDER  8/31/07    non-invasive Gr I bladder tumor     C NONSPECIFIC PROCEDURE  3/03    Colposcopy        Social History     Tobacco Use     Smoking status: Never Smoker     Smokeless tobacco: Never Used   Substance Use Topics     Alcohol use: Yes     Comment: 3-5/ week     Family History   Problem Relation Age of Onset     Diabetes Father         adult onset-using insulin     C.A.D. Maternal Grandmother         CABG at 63     C.A.D. Paternal Grandmother         MI--70's-80's     Alcohol/Drug Maternal Grandfather      Respiratory Paternal Grandfather         asthma     Hypertension Mother            Reviewed and updated as needed this visit by clinical staff  Tobacco  Allergies  Meds  Problems  Med Hx  Surg Hx  Fam Hx       Reviewed and updated as needed this visit by Provider  Tobacco  Allergies  Meds  Problems  Med Hx  Surg Hx  Fam Hx         ROS:  Constitutional, HEENT, cardiovascular, pulmonary, GI, , musculoskeletal, neuro, skin, endocrine and psych systems are negative, except as otherwise noted.    OBJECTIVE:     /75   Pulse 68   Temp 98  F (36.7  C) (Oral)   Wt 76.3 kg (168 lb 2 oz)   LMP 12/01/2018   SpO2 99%   Breastfeeding? No   BMI 29.78 kg/m    Body mass index is 29.78 kg/m .  GENERAL: healthy, alert and no distress  NECK: no adenopathy, no asymmetry, masses, or scars and thyroid normal to palpation  CV: regular rate and rhythm, normal S1 S2, no S3 or S4, no murmur, click or rub, no peripheral edema and peripheral pulses strong  PSYCH: mentation appears normal, affect normal/bright    Diagnostic Test Results:  Results for orders placed or performed in visit on 01/23/19 (from the past 24 hour(s))   Beta HCG qual IFA urine - [facility] [place]   Result Value Ref Range    Beta HCG Qual IFA Urine Negative NEG^Negative      additional labs pending    ASSESSMENT/PLAN:     1. Hypothyroidism, unspecified type  TSH is pending  Will adjust accordingly -   - TSH with free T4 reflex    2. Malignant neoplasm of urinary bladder, unspecified site (H)  [facility] -  getting CT and cystoscopy  - Comprehensive metabolic panel (BMP + Alb, Alk Phos, ALT, AST, Total. Bili, TP)    3. Polyarteritis nodosa (H)  No recurrence -   Will monitor for any symptoms  - Comprehensive metabolic panel (BMP + Alb, Alk Phos, ALT, AST, Total. Bili, TP)    4. DIANA (generalized anxiety disorder)  Refilled  Pt prefers to stay on the medication  - Comprehensive metabolic panel (BMP + Alb, Alk Phos, ALT, AST, Total. Bili, TP)  - sertraline (ZOLOFT) 50 MG tablet; Take 1 tablet (50 mg) by mouth daily  Dispense: 90 tablet; Refill: 3    5. Dermatitis  Refilled for vaginal dermatitis - found she is allergic to MCI  - triamcinolone (KENALOG) 0.1 % external ointment; Apply topically 2 times daily  Dispense: 80 g; Refill: 1    6. Amenorrhea   will check alabs  - Follicle stimulating hormone  - Beta HCG qual IFA urine - [facility] and [place]    7. Vaccine for single bacterial disease   given  - FLU VAC PRESRV FREE QUAD SPLIT VIR, IM (3+ YRS)  - Pneumococcal vaccine 23 valent PPSV23  (Pneumovax) [47521]  - ADMIN: Vaccine, Initial (70236)    8. Mild intermittent asthma with acute exacerbation  refilled  - fluticasone (FLOVENT DISKUS) 100 MCG/BLIST inhaler; Inhale 1 puff into the lungs 2 times daily  Dispense: 1 Inhaler; Refill: 3    9. Cough  refilled  - albuterol (PROAIR HFA/PROVENTIL HFA/VENTOLIN HFA) 108 (90 Base) MCG/ACT inhaler; Inhale 2 puffs into the lungs every 6 hours as needed for shortness of breath / dyspnea or wheezing  Dispense: 1 Inhaler; Refill: 3    Pt will call or RTC if symptoms worsen or do not improve.      Michelle Caldwell, DO  [facility]

## 2019-01-23 NOTE — PROGRESS NOTES
Injectable Influenza Immunization Documentation    1.  Is the person to be vaccinated sick today?   No    2. Does the person to be vaccinated have an allergy to a component   of the vaccine?   No  Egg Allergy Algorithm Link    3. Has the person to be vaccinated ever had a serious reaction   to influenza vaccine in the past?   No    4. Has the person to be vaccinated ever had Guillain-Barré syndrome?   No    Form completed by DANIKA Sutton    Screening Questionnaire for Adult Immunization    Are you sick today?   Yes   Do you have allergies to medications, food, a vaccine component or latex?   No   Have you ever had a serious reaction after receiving a vaccination?   No   Do you have a long-term health problem with heart disease, lung disease, asthma, kidney disease, metabolic disease (e.g. diabetes), anemia, or other blood disorder?   No   Do you have cancer, leukemia, HIV/AIDS, or any other immune system problem?   No   In the past 3 months, have you taken medications that affect  your immune system, such as prednisone, other steroids, or anticancer drugs; drugs for the treatment of rheumatoid arthritis, Crohn s disease, or psoriasis; or have you had radiation treatments?   No   Have you had a seizure, or a brain or other nervous system problem?   No   During the past year, have you received a transfusion of blood or blood     products, or been given immune (gamma) globulin or antiviral drug?   No   For women: Are you pregnant or is there a chance you could become        pregnant during the next month?   No   Have you received any vaccinations in the past 4 weeks?   No     Immunization questionnaire answers were all negative.        Per orders of Dr. ordaz, injection of PVC 23 given by Kathi Mario. Patient instructed to remain in clinic for 15 minutes afterwards, and to report any adverse reaction to me immediately.       Screening performed by Kathi Mario on 1/23/2019 at 1:05  PM.

## 2019-01-23 NOTE — NURSING NOTE
"Chief Complaint   Patient presents with     Thyroid Problem     Depression     /75   Pulse 68   Temp 98  F (36.7  C) (Oral)   Wt 76.3 kg (168 lb 2 oz)   LMP 12/01/2018   SpO2 99%   Breastfeeding? No   BMI 29.78 kg/m   Estimated body mass index is 29.78 kg/m  as calculated from the following:    Height as of 1/5/18: 1.6 m (5' 3\").    Weight as of this encounter: 76.3 kg (168 lb 2 oz).  bp completed using cuff size: regular      Health Maintenance addressed:  NONE    ABSTRACT--Pt had pap done on this date: 11/01/2017 with this group: [facility], results were normal, SENT TO ABSTRACTING            All.DANIKA Mario    "

## 2019-01-24 LAB
ALBUMIN SERPL-MCNC: 3.9 G/DL (ref 3.4–5)
ALP SERPL-CCNC: 64 U/L (ref 40–150)
ALT SERPL W P-5'-P-CCNC: 44 U/L (ref 0–50)
ANION GAP SERPL CALCULATED.3IONS-SCNC: 7 MMOL/L (ref 3–14)
AST SERPL W P-5'-P-CCNC: 26 U/L (ref 0–45)
BILIRUB SERPL-MCNC: 0.3 MG/DL (ref 0.2–1.3)
BUN SERPL-MCNC: 12 MG/DL (ref 7–30)
CALCIUM SERPL-MCNC: 9.3 MG/DL (ref 8.5–10.1)
CHLORIDE SERPL-SCNC: 106 MMOL/L (ref 94–109)
CO2 SERPL-SCNC: 24 MMOL/L (ref 20–32)
CREAT SERPL-MCNC: 0.81 MG/DL (ref 0.52–1.04)
GFR SERPL CREATININE-BSD FRML MDRD: 88 ML/MIN/{1.73_M2}
GLUCOSE SERPL-MCNC: 84 MG/DL (ref 70–99)
POTASSIUM SERPL-SCNC: 4 MMOL/L (ref 3.4–5.3)
PROT SERPL-MCNC: 8.2 G/DL (ref 6.8–8.8)
SODIUM SERPL-SCNC: 137 MMOL/L (ref 133–144)
TSH SERPL DL<=0.005 MIU/L-ACNC: 3.04 MU/L (ref 0.4–4)

## 2019-01-24 ASSESSMENT — ANXIETY QUESTIONNAIRES: GAD7 TOTAL SCORE: 3

## 2019-01-24 ASSESSMENT — ASTHMA QUESTIONNAIRES: ACT_TOTALSCORE: 25

## 2019-01-25 ENCOUNTER — TELEPHONE (OUTPATIENT)
Dept: FAMILY MEDICINE | Facility: CLINIC | Age: 44
End: 2019-01-25

## 2019-01-25 NOTE — TELEPHONE ENCOUNTER
PN  Patient called.  Has flu and pneumonia vaccines on 1/23    Right arm where  pneumo shot given was sore day she received it.  Yesterday arm still sore/tender to touch. Tenderness in right armpit  Had low grade temp yesterday am and by late afternoon had chills, body aches, 101 temp  Ibuprofen took care of fever  No fever since.    Arm  to touch.  Not warm at injecton site but is light red, 2.5 inch [place] around the site.  Slightly raised.    States she feels like she has the flu.  Feeling better today than she did yesterday.    Please advise.  Thanks, Emerita Farrell, RN

## 2019-01-25 NOTE — TELEPHONE ENCOUNTER
Please let her know -   This is a pretty strong reaction to the pneumonia vaccine.  She can try OTC tylenol or ibuprofen prn   Should last 3-4 days but hopefully improves quicker  Watch for cellulitis (increasing redness at the injection site) but doesn't sound like that now.  Thanks  PN

## 2019-01-25 NOTE — TELEPHONE ENCOUNTER
Reason for call:  Patient reporting a symptom    Symptom or request: Fever, body ahces, chills and the arm is     Duration (how long have symptoms been present): 1/24/19    Have you been treated for this before? yes    Additional comments: Pt received flu and phenomena shots on 1/23/19 and now having side affects    Phone Number patient can be reached at:  Home number on file [phone] (home)    Best Time:  anytime    Can we leave a detailed message on this number:  YES    Call taken on 1/25/2019 at 8:07 AM by Marie Arana

## 2019-01-29 ENCOUNTER — MYC MEDICAL ADVICE (OUTPATIENT)
Dept: FAMILY MEDICINE | Facility: CLINIC | Age: 44
End: 2019-01-29

## 2019-01-29 DIAGNOSIS — E03.9 HYPOTHYROIDISM, UNSPECIFIED TYPE: ICD-10-CM

## 2019-01-29 RX ORDER — LEVOTHYROXINE SODIUM 200 UG/1
200 TABLET ORAL DAILY
Qty: 30 TABLET | Refills: 1 | Status: SHIPPED | OUTPATIENT
Start: 2019-01-29 | End: 2019-03-29

## 2019-03-27 DIAGNOSIS — E03.9 HYPOTHYROIDISM, UNSPECIFIED TYPE: ICD-10-CM

## 2019-03-27 LAB
T4 FREE SERPL-MCNC: 1.33 NG/DL (ref 0.76–1.46)
TSH SERPL DL<=0.005 MIU/L-ACNC: 0.11 MU/L (ref 0.4–4)

## 2019-03-27 PROCEDURE — 36415 COLL VENOUS BLD VENIPUNCTURE: CPT | Performed by: FAMILY MEDICINE

## 2019-03-27 PROCEDURE — 84443 ASSAY THYROID STIM HORMONE: CPT | Performed by: FAMILY MEDICINE

## 2019-03-27 PROCEDURE — 84439 ASSAY OF FREE THYROXINE: CPT | Performed by: FAMILY MEDICINE

## 2019-03-29 DIAGNOSIS — E03.9 HYPOTHYROIDISM, UNSPECIFIED TYPE: ICD-10-CM

## 2019-03-29 RX ORDER — LEVOTHYROXINE SODIUM 200 UG/1
200 TABLET ORAL DAILY
Qty: 30 TABLET | Refills: 1 | Status: SHIPPED | OUTPATIENT
Start: 2019-03-29 | End: 2019-05-31

## 2019-03-29 NOTE — RESULT ENCOUNTER NOTE
Dear Audra,   Your test results are all back -   Your TSH was too low which means you are getting too much thyroid medication.  Lets have you stay on the 200mcg daily EXCEPT on sundays only take 1/2 tablet (100mcg).    Plan to recheck in 6 weeks at a lab only visit.  Let us know if you have any questions.  -Mcihelle Caldwell, DO

## 2019-04-25 ENCOUNTER — TRANSFERRED RECORDS (OUTPATIENT)
Dept: HEALTH INFORMATION MANAGEMENT | Facility: CLINIC | Age: 44
End: 2019-04-25

## 2019-05-29 DIAGNOSIS — E03.9 HYPOTHYROIDISM, UNSPECIFIED TYPE: ICD-10-CM

## 2019-05-29 LAB
T4 FREE SERPL-MCNC: 1.21 NG/DL (ref 0.76–1.46)
TSH SERPL DL<=0.005 MIU/L-ACNC: 0.39 MU/L (ref 0.4–4)

## 2019-05-29 PROCEDURE — 36415 COLL VENOUS BLD VENIPUNCTURE: CPT | Performed by: FAMILY MEDICINE

## 2019-05-29 PROCEDURE — 84439 ASSAY OF FREE THYROXINE: CPT | Performed by: FAMILY MEDICINE

## 2019-05-29 PROCEDURE — 84443 ASSAY THYROID STIM HORMONE: CPT | Performed by: FAMILY MEDICINE

## 2019-05-31 ENCOUNTER — OFFICE VISIT (OUTPATIENT)
Dept: FAMILY MEDICINE | Facility: CLINIC | Age: 44
End: 2019-05-31
Payer: COMMERCIAL

## 2019-05-31 VITALS
BODY MASS INDEX: 29.77 KG/M2 | HEIGHT: 63 IN | HEART RATE: 72 BPM | DIASTOLIC BLOOD PRESSURE: 70 MMHG | WEIGHT: 168 LBS | TEMPERATURE: 98.6 F | SYSTOLIC BLOOD PRESSURE: 104 MMHG | OXYGEN SATURATION: 97 % | RESPIRATION RATE: 16 BRPM

## 2019-05-31 DIAGNOSIS — E03.9 HYPOTHYROIDISM, UNSPECIFIED TYPE: ICD-10-CM

## 2019-05-31 DIAGNOSIS — R07.0 THROAT PAIN: ICD-10-CM

## 2019-05-31 DIAGNOSIS — J02.0 STREPTOCOCCAL PHARYNGITIS: Primary | ICD-10-CM

## 2019-05-31 LAB
DEPRECATED S PYO AG THROAT QL EIA: ABNORMAL
SPECIMEN SOURCE: ABNORMAL

## 2019-05-31 PROCEDURE — 87880 STREP A ASSAY W/OPTIC: CPT | Performed by: PHYSICIAN ASSISTANT

## 2019-05-31 PROCEDURE — 99213 OFFICE O/P EST LOW 20 MIN: CPT | Performed by: PHYSICIAN ASSISTANT

## 2019-05-31 RX ORDER — AMOXICILLIN 875 MG
875 TABLET ORAL 2 TIMES DAILY
Qty: 20 TABLET | Refills: 0 | Status: SHIPPED | OUTPATIENT
Start: 2019-05-31 | End: 2019-07-08

## 2019-05-31 ASSESSMENT — ANXIETY QUESTIONNAIRES
6. BECOMING EASILY ANNOYED OR IRRITABLE: SEVERAL DAYS
5. BEING SO RESTLESS THAT IT IS HARD TO SIT STILL: NOT AT ALL
3. WORRYING TOO MUCH ABOUT DIFFERENT THINGS: SEVERAL DAYS
2. NOT BEING ABLE TO STOP OR CONTROL WORRYING: NOT AT ALL
IF YOU CHECKED OFF ANY PROBLEMS ON THIS QUESTIONNAIRE, HOW DIFFICULT HAVE THESE PROBLEMS MADE IT FOR YOU TO DO YOUR WORK, TAKE CARE OF THINGS AT HOME, OR GET ALONG WITH OTHER PEOPLE: NOT DIFFICULT AT ALL
1. FEELING NERVOUS, ANXIOUS, OR ON EDGE: SEVERAL DAYS
7. FEELING AFRAID AS IF SOMETHING AWFUL MIGHT HAPPEN: SEVERAL DAYS
GAD7 TOTAL SCORE: 4

## 2019-05-31 ASSESSMENT — PAIN SCALES - GENERAL: PAINLEVEL: SEVERE PAIN (7)

## 2019-05-31 ASSESSMENT — MIFFLIN-ST. JEOR: SCORE: 1371.17

## 2019-05-31 ASSESSMENT — PATIENT HEALTH QUESTIONNAIRE - PHQ9
SUM OF ALL RESPONSES TO PHQ QUESTIONS 1-9: 3
5. POOR APPETITE OR OVEREATING: NOT AT ALL

## 2019-05-31 NOTE — RESULT ENCOUNTER NOTE
Dear Audra,   Your test results are all back -   Very close - I think we can stay at your current dose.  Let us know if you need a refill.  Let us know if you have any questions.  -Michelle Caldwell, DO

## 2019-05-31 NOTE — PROGRESS NOTES
Subjective     Audra Parada is a 44 year old female who presents to clinic today for the following health issues:    HPI   Acute Illness   Acute illness concerns: Sore thraot/fatigue  Onset: couple days    Fever: no    Chills/Sweats: no    Headache (location?): YES    Sinus Pressure:no    Conjunctivitis:  no    Ear Pain: YES- behind the ear    Rhinorrhea: YES    Congestion: no    Sore Throat: YES     Cough: no    Wheeze: no    Decreased Appetite: YES    Nausea: no    Vomiting: no    Diarrhea:  no    Dysuria/Freq.: no    Fatigue/Achiness: YES    Sick/Strep Exposure: no     Therapies Tried and outcome: ibuprofen, nasal spray    Sore throat and fatigue for last two days. - not sure if seasonal allergies but not her typical allergy symptoms.  .  Swollen and tender lymph nodes of neck especially right . Snoring at night and normally doesn't snore.  Has been fatigued  flovent as needed- usually viral upper respiratory infection trigger for her asthma     Patient Active Problem List   Diagnosis     Hypothyroidism     Contact dermatitis and other eczema, due to unspecified cause     Abnormal glandular Papanicolaou smear of cervix     Polyarteritis nodosa (H)     Bladder cancer (H)     CARDIOVASCULAR SCREENING; LDL GOAL LESS THAN 160     Recurrent pregnancy loss     DIANA (generalized anxiety disorder)     Mild intermittent asthma with acute exacerbation     Past Surgical History:   Procedure Laterality Date     C INCISE/FULGUR LESN BLADDER  8/31/07    non-invasive Gr I bladder tumor     C NONSPECIFIC PROCEDURE  3/03    Colposcopy       Social History     Tobacco Use     Smoking status: Never Smoker     Smokeless tobacco: Never Used   Substance Use Topics     Alcohol use: Yes     Comment: 3-5/ week     Family History   Problem Relation Age of Onset     Diabetes Father         adult onset-using insulin     Hypertension Mother      KRISTEN Maternal Grandmother         CABG at 63     Alcohol/Drug Maternal Grandfather      KRISTEN  "Paternal Grandmother         MI--70's-80's     Respiratory Paternal Grandfather         asthma         Current Outpatient Medications   Medication Sig Dispense Refill     albuterol (PROAIR HFA/PROVENTIL HFA/VENTOLIN HFA) 108 (90 Base) MCG/ACT inhaler Inhale 2 puffs into the lungs every 6 hours as needed for shortness of breath / dyspnea or wheezing 1 Inhaler 3            fluticasone (FLOVENT DISKUS) 100 MCG/BLIST inhaler Inhale 1 puff into the lungs 2 times daily 1 Inhaler 3     levothyroxine (SYNTHROID/LEVOTHROID) 200 MCG tablet Take 1 tablet (200 mcg) by mouth daily Except on Sundays only take 1/2 tablet 30 tablet 1     Multiple Vitamins-Minerals (MULTIVITAMIN ADULT PO)        Omega-3 Fatty Acids (OMEGA-3 FISH OIL PO)        sertraline (ZOLOFT) 50 MG tablet Take 1 tablet (50 mg) by mouth daily 90 tablet 3     triamcinolone (KENALOG) 0.1 % external ointment Apply topically 2 times daily 80 g 1     vitamin D3 (CHOLECALCIFEROL) 2000 units tablet Take 1 tablet by mouth daily       BP Readings from Last 3 Encounters:   05/31/19 104/70   01/23/19 120/75   01/05/18 114/70    Wt Readings from Last 3 Encounters:   05/31/19 76.2 kg (168 lb)   01/23/19 76.3 kg (168 lb 2 oz)   01/05/18 70.3 kg (155 lb)                      Reviewed and updated as needed this visit by Provider  Tobacco  Allergies  Meds  Problems  Med Hx  Surg Hx  Fam Hx  Soc Hx          Review of Systems   ROS COMP: Constitutional, HEENT, cardiovascular, pulmonary, gi and gu systems are negative, except as otherwise noted.      Objective    /70 (BP Location: Right arm, Patient Position: Chair, Cuff Size: Adult Large)   Pulse 72   Temp 98.6  F (37  C) (Oral)   Resp 16   Ht 1.6 m (5' 3\")   Wt 76.2 kg (168 lb)   LMP 05/22/2019 (Exact Date)   SpO2 97%   Breastfeeding? No   BMI 29.76 kg/m    There is no height or weight on file to calculate BMI.  Physical Exam   GENERAL: healthy, alert and no distress  EYES: Eyes grossly normal to inspection, " "PERRL and conjunctivae and sclerae normal  HENT: normal cephalic/atraumatic, ear canals and TM's normal, nose and mouth without ulcers or lesions, tonsillar hypertrophy, tonsillar erythema and tonsillar exudate  NECK: bilateral anterior cervical adenopathy, no asymmetry, masses, or scars and thyroid normal to palpation  RESP: lungs clear to auscultation - no rales, rhonchi or wheezes  CV: regular rate and rhythm, normal S1 S2, no S3 or S4, no murmur, click or rub, no peripheral edema and peripheral pulses strong  ABDOMEN: soft, nontender, no hepatosplenomegaly, no masses and bowel sounds normal  MS: no gross musculoskeletal defects noted, no edema    Diagnostic Test Results:  Results for orders placed or performed in visit on 05/31/19 (from the past 24 hour(s))   Strep, Rapid Screen   Result Value Ref Range    Specimen Description Throat     Rapid Strep A Screen (A)      POSITIVE: Group A Streptococcal antigen detected by immunoassay.           Assessment & Plan     1. Streptococcal pharyngitis  Will treat with amoxillin twice a day for 10 days   - amoxicillin (AMOXIL) 875 MG tablet; Take 1 tablet (875 mg) by mouth 2 times daily for 10 days  Dispense: 20 tablet; Refill: 0    2. Throat pain    - Strep, Rapid Screen     BMI:   Estimated body mass index is 29.76 kg/m  as calculated from the following:    Height as of this encounter: 1.6 m (5' 3\").    Weight as of this encounter: 76.2 kg (168 lb).   Weight management plan: Discussed healthy diet and exercise guidelines        Patient Instructions   Take amoxicillin twice a day for 10 days  Ibuprofen up to 3 over the counter tablets four times a day as needed for pain  Return urgently if any change in symptoms like increasing pain, fever, cough or other change in symptoms.       Return in about 3 days (around 6/3/2019), or if symptoms worsen or fail to improve.    Jenny Soliman PA-C  [facility]      "

## 2019-05-31 NOTE — TELEPHONE ENCOUNTER
"Levothyroxine   Last Written Prescription Date:  03/29/2019  Last Fill Quantity: 30,  # refills: 1   Last office visit: 5/31/2019 with prescribing provider:  Cecilia, last office sabra with pcp was on 1/23/2019   Future Office Visit:      Requested Prescriptions   Pending Prescriptions Disp Refills     levothyroxine (SYNTHROID/LEVOTHROID) 200 MCG tablet [Pharmacy Med Name: LEVOTHYROXINE SOD 0.2MG(200MCG) TAB] 30 tablet 0     Sig: TAKE 1 TABLET BY MOUTH DAILY EXCEPT ON SUNDAYS ONLY TAKE 1/2 TABLET       Thyroid Protocol Failed - 5/31/2019  1:32 PM        Failed - Normal TSH on file in past 12 months     Recent Labs   Lab Test 05/29/19  1502   TSH 0.39*              Passed - Patient is 12 years or older        Passed - Recent (12 mo) or future (30 days) visit within the authorizing provider's specialty     Patient had office visit in the last 12 months or has a visit in the next 30 days with authorizing provider or within the authorizing provider's specialty.  See \"Patient Info\" tab in inbasket, or \"Choose Columns\" in Meds & Orders section of the refill encounter.              Passed - Medication is active on med list        Passed - No active pregnancy on record     If patient is pregnant or has had a positive pregnancy test, please check TSH.          Passed - No positive pregnancy test in past 12 months     If patient is pregnant or has had a positive pregnancy test, please check TSH.            "

## 2019-06-01 ASSESSMENT — ASTHMA QUESTIONNAIRES: ACT_TOTALSCORE: 25

## 2019-06-01 ASSESSMENT — ANXIETY QUESTIONNAIRES: GAD7 TOTAL SCORE: 4

## 2019-06-03 RX ORDER — LEVOTHYROXINE SODIUM 200 UG/1
TABLET ORAL
Qty: 90 TABLET | Refills: 1 | Status: SHIPPED | OUTPATIENT
Start: 2019-06-03 | End: 2019-07-08

## 2019-06-07 ENCOUNTER — TELEPHONE (OUTPATIENT)
Dept: FAMILY MEDICINE | Facility: CLINIC | Age: 44
End: 2019-06-07

## 2019-06-07 NOTE — TELEPHONE ENCOUNTER
Reason for call:  Patient reporting a symptom    Symptom or request: swollen right ankle    Duration (how long have symptoms been present): last night    Have you been treated for this before? No    Additional comments: pt is currently taking medication for strep throat and she is wondering if she is having a reaction    Phone Number patient can be reached at:  Home number on file [phone] (home)    Best Time:  anytime    Can we leave a detailed message on this number:  YES    Call taken on 6/7/2019 at 11:09 AM by Marie Arana

## 2019-06-07 NOTE — TELEPHONE ENCOUNTER
Patient was seen at [facility] 5/31/2019 for strep  Amoxicillin started for 10 days - has 2 days left   No rash   No breathing issues    Last night had swollen ankles  This is her only symptom at this time  Went to bed and it was better this AM  Ankles starting to swell again today  Hasn't been walking/standing more than usual  Swollen ankles not typical for her     Pt states she had vasculitis a few years ago - first symptom was swollen ankles    Offered appt as she's worried about vasculitis  Said she could come in for piece of mind of she wanted   Ultimately though advised pt continue to monitor for now  If worsening/not improving then appt  Pt agrees with plan - will see how she is Monday    Silvana MARES RN

## 2019-07-08 ENCOUNTER — OFFICE VISIT (OUTPATIENT)
Dept: FAMILY MEDICINE | Facility: CLINIC | Age: 44
End: 2019-07-08
Payer: COMMERCIAL

## 2019-07-08 ENCOUNTER — TELEPHONE (OUTPATIENT)
Dept: FAMILY MEDICINE | Facility: CLINIC | Age: 44
End: 2019-07-08

## 2019-07-08 VITALS
DIASTOLIC BLOOD PRESSURE: 73 MMHG | WEIGHT: 171 LBS | HEIGHT: 63 IN | OXYGEN SATURATION: 98 % | BODY MASS INDEX: 30.3 KG/M2 | RESPIRATION RATE: 18 BRPM | HEART RATE: 67 BPM | TEMPERATURE: 97.8 F | SYSTOLIC BLOOD PRESSURE: 118 MMHG

## 2019-07-08 DIAGNOSIS — E03.9 HYPOTHYROIDISM, UNSPECIFIED TYPE: ICD-10-CM

## 2019-07-08 DIAGNOSIS — R21 RASH AND NONSPECIFIC SKIN ERUPTION: Primary | ICD-10-CM

## 2019-07-08 DIAGNOSIS — Z86.79 HISTORY OF VASCULITIS: ICD-10-CM

## 2019-07-08 LAB
ALBUMIN SERPL-MCNC: 3.6 G/DL (ref 3.4–5)
ALP SERPL-CCNC: 80 U/L (ref 40–150)
ALT SERPL W P-5'-P-CCNC: 54 U/L (ref 0–50)
ANION GAP SERPL CALCULATED.3IONS-SCNC: 4 MMOL/L (ref 3–14)
AST SERPL W P-5'-P-CCNC: 37 U/L (ref 0–45)
BILIRUB SERPL-MCNC: 0.3 MG/DL (ref 0.2–1.3)
BUN SERPL-MCNC: 9 MG/DL (ref 7–30)
CALCIUM SERPL-MCNC: 9.1 MG/DL (ref 8.5–10.1)
CHLORIDE SERPL-SCNC: 105 MMOL/L (ref 94–109)
CO2 SERPL-SCNC: 29 MMOL/L (ref 20–32)
CREAT SERPL-MCNC: 0.67 MG/DL (ref 0.52–1.04)
ERYTHROCYTE [SEDIMENTATION RATE] IN BLOOD BY WESTERGREN METHOD: 99 MM/H (ref 0–20)
GFR SERPL CREATININE-BSD FRML MDRD: >90 ML/MIN/{1.73_M2}
GLUCOSE SERPL-MCNC: 82 MG/DL (ref 70–99)
POTASSIUM SERPL-SCNC: 4 MMOL/L (ref 3.4–5.3)
PROT SERPL-MCNC: 8.1 G/DL (ref 6.8–8.8)
SODIUM SERPL-SCNC: 138 MMOL/L (ref 133–144)
T4 FREE SERPL-MCNC: 1.28 NG/DL (ref 0.76–1.46)
TSH SERPL DL<=0.005 MIU/L-ACNC: 0.24 MU/L (ref 0.4–4)

## 2019-07-08 PROCEDURE — 80053 COMPREHEN METABOLIC PANEL: CPT | Performed by: NURSE PRACTITIONER

## 2019-07-08 PROCEDURE — 99214 OFFICE O/P EST MOD 30 MIN: CPT | Performed by: NURSE PRACTITIONER

## 2019-07-08 PROCEDURE — 85652 RBC SED RATE AUTOMATED: CPT | Performed by: NURSE PRACTITIONER

## 2019-07-08 PROCEDURE — 84443 ASSAY THYROID STIM HORMONE: CPT | Performed by: NURSE PRACTITIONER

## 2019-07-08 PROCEDURE — 36415 COLL VENOUS BLD VENIPUNCTURE: CPT | Performed by: NURSE PRACTITIONER

## 2019-07-08 PROCEDURE — 84439 ASSAY OF FREE THYROXINE: CPT | Performed by: NURSE PRACTITIONER

## 2019-07-08 RX ORDER — LEVOTHYROXINE SODIUM 200 UG/1
TABLET ORAL
Qty: 90 TABLET | Refills: 1 | Status: SHIPPED | OUTPATIENT
Start: 2019-07-08 | End: 2020-04-10

## 2019-07-08 RX ORDER — TRIAMCINOLONE ACETONIDE 1 MG/G
CREAM TOPICAL 2 TIMES DAILY
Qty: 80 G | Refills: 1 | Status: SHIPPED | OUTPATIENT
Start: 2019-07-08 | End: 2022-03-17

## 2019-07-08 ASSESSMENT — MIFFLIN-ST. JEOR: SCORE: 1384.78

## 2019-07-08 ASSESSMENT — PAIN SCALES - GENERAL: PAINLEVEL: NO PAIN (0)

## 2019-07-08 NOTE — TELEPHONE ENCOUNTER
Reason for Call:  Other FYI / medical advice     Detailed comments: Pt called and wanted to let Dr. Caldwell that she ended up going to the [facility] location today due to availability. She had a rash looked at and the provider there seemed to think that it was a heat rash, but due the the Pt's history of vasculitis she wanted to have Dr. Caldwell aware of this. The doctor ran SED rad come back high as well and that concerns the Pt. She would like a call back from someone to talk about this more.      Phone Number Patient can be reached at: Cell number on file:    Telephone Information:   Mobile [phone]       Best Time: Any    Can we leave a detailed message on this number? YES    Call taken on 7/8/2019 at 3:07 PM by Hazel Reynoso

## 2019-07-08 NOTE — PROGRESS NOTES
Subjective     Audra Parada is a 44 year old female who presents to clinic today for the following health issues:    HPI   Rash  Onset: last week    Description:   Location: upper back on RT side initially and has not moved to both shoulders, under LT breast, LT side face, scalp and hands  Character: raised, red  Itching (Pruritis): YES in head    Progression of Symptoms:  worsening    Accompanying Signs & Symptoms:  Fever: no   Body aches or joint pain: no   Sore throat symptoms: no   Recent cold symptoms: no     History:   Previous similar rash: no     Precipitating factors:   Exposure to similar rash: no   New exposures: None   Recent travel: no [place]    Alleviating factors: nothing      Therapies Tried and outcome: aveeno oatmeal bath, and antihistamine, as well as aloe. Coconut oil and tea tree oil on scalp    [place], no rash issues. Rash started up on right shoulder, using sunscreen. No itching. Also had vasculitis 10 years ago. Sensitive skin. Also having some ankle swelling, and some joint pain in hands.  She is wondering if this is related to vasculitis.  She would like some inflammatory markers checked today.  She is also willing to try the topical steroid for her rash.  She feels last time she had vasculitis it was more bluish spots that she noted versus the rash that she has today.        Patient Active Problem List   Diagnosis     Hypothyroidism     Contact dermatitis and other eczema, due to unspecified cause     Abnormal glandular Papanicolaou smear of cervix     Polyarteritis nodosa (H)     Bladder cancer (H)     CARDIOVASCULAR SCREENING; LDL GOAL LESS THAN 160     Recurrent pregnancy loss     DIANA (generalized anxiety disorder)     Mild intermittent asthma with acute exacerbation     Past Surgical History:   Procedure Laterality Date     C INCISE/FULGUR LESN BLADDER  8/31/07    non-invasive Gr I bladder tumor     C NONSPECIFIC  PROCEDURE  3/03    Colposcopy       Social History     Tobacco Use     Smoking status: Never Smoker     Smokeless tobacco: Never Used   Substance Use Topics     Alcohol use: Yes     Comment: 3-5/ week     Family History   Problem Relation Age of Onset     Diabetes Father         adult onset-using insulin     Hypertension Mother      C.A.D. Maternal Grandmother         CABG at 63     Alcohol/Drug Maternal Grandfather      C.A.D. Paternal Grandmother         MI--70's-80's     Respiratory Paternal Grandfather         asthma         Current Outpatient Medications   Medication Sig Dispense Refill     albuterol (PROAIR HFA/PROVENTIL HFA/VENTOLIN HFA) 108 (90 Base) MCG/ACT inhaler Inhale 2 puffs into the lungs every 6 hours as needed for shortness of breath / dyspnea or wheezing 1 Inhaler 3     fluticasone (FLOVENT DISKUS) 100 MCG/BLIST inhaler Inhale 1 puff into the lungs 2 times daily 1 Inhaler 3     levothyroxine (SYNTHROID/LEVOTHROID) 200 MCG tablet TAKE 1 TABLET BY MOUTH DAILY 5 days a week EXCEPT ON SUNDAYS and WEDNESDAY only take 1/2 TABLET 90 tablet 1     Multiple Vitamins-Minerals (MULTIVITAMIN ADULT PO)        Omega-3 Fatty Acids (OMEGA-3 FISH OIL PO)        sertraline (ZOLOFT) 50 MG tablet Take 1 tablet (50 mg) by mouth daily 90 tablet 3     triamcinolone (KENALOG) 0.1 % external cream Apply topically 2 times daily While rash present 80 g 1     triamcinolone (KENALOG) 0.1 % external ointment Apply topically 2 times daily 80 g 1     vitamin D3 (CHOLECALCIFEROL) 2000 units tablet Take 1 tablet by mouth daily       Allergies   Allergen Reactions     No Known Drug Allergies          Reviewed and updated as needed this visit by Provider  Tobacco  Allergies  Meds  Problems  Med Hx  Surg Hx  Fam Hx         Review of Systems   ROS COMP: skin, CV, Resp      Objective    /73 (BP Location: Right arm, Patient Position: Sitting, Cuff Size: Adult Regular)   Pulse 67   Temp 97.8  F (36.6  C) (Oral)   Resp 18    "Ht 1.6 m (5' 3\")   Wt 77.6 kg (171 lb)   LMP 06/20/2019   SpO2 98%   BMI 30.29 kg/m    Body mass index is 30.29 kg/m .  Physical Exam   GENERAL: healthy, alert and no distress  RESP: lungs clear to auscultation - no rales, rhonchi or wheezes  CV: regular rate and rhythm, normal S1 S2, no S3 or S4, no murmur, click or rub, no peripheral edema  SKIN: Chest, right shoulder and right arm, under left breast and small slightly elevated bumps that are slightly red on skin.  Patient denies itching, no pain.  Rough in texture.    Diagnostic Test Results:  Labs reviewed in Epic  Results for orders placed or performed in visit on 07/08/19 (from the past 24 hour(s))   Comprehensive metabolic panel (BMP + Alb, Alk Phos, ALT, AST, Total. Bili, TP)   Result Value Ref Range    Sodium 138 133 - 144 mmol/L    Potassium 4.0 3.4 - 5.3 mmol/L    Chloride 105 94 - 109 mmol/L    Carbon Dioxide 29 20 - 32 mmol/L    Anion Gap 4 3 - 14 mmol/L    Glucose 82 70 - 99 mg/dL    Urea Nitrogen 9 7 - 30 mg/dL    Creatinine 0.67 0.52 - 1.04 mg/dL    GFR Estimate >90 >60 mL/min/[1.73_m2]    GFR Estimate If Black >90 >60 mL/min/[1.73_m2]    Calcium 9.1 8.5 - 10.1 mg/dL    Bilirubin Total 0.3 0.2 - 1.3 mg/dL    Albumin 3.6 3.4 - 5.0 g/dL    Protein Total 8.1 6.8 - 8.8 g/dL    Alkaline Phosphatase 80 40 - 150 U/L    ALT 54 (H) 0 - 50 U/L    AST 37 0 - 45 U/L   ESR: Erythrocyte sedimentation rate   Result Value Ref Range    Sed Rate 99 (H) 0 - 20 mm/h   TSH with free T4 reflex   Result Value Ref Range    TSH 0.24 (L) 0.40 - 4.00 mU/L   T4 free   Result Value Ref Range    T4 Free 1.28 0.76 - 1.46 ng/dL           Assessment & Plan     1. Rash and nonspecific skin eruption  Leaning towards possible heat rash, although ESR is elevated.   - Comprehensive metabolic panel (BMP + Alb, Alk Phos, ALT, AST, Total. Bili, TP)  - triamcinolone (KENALOG) 0.1 % external cream; Apply topically 2 times daily While rash present  Dispense: 80 g; Refill: 1  - T4 free  - " "T4 free    2. History of vasculitis  Recommend repeating in 1 to 2 weeks to see if trending up or down.   - ESR: Erythrocyte sedimentation rate    3. Hypothyroidism, unspecified type  Thyroid level is lower than it was 2 months ago.  Since March level has been below threshold.  Would recommend making slight adjustments, take 200 mcg 5 days a week, then take half a tab twice a week.  Repeat TSH in 2 months.  - TSH with free T4 reflex  - levothyroxine (SYNTHROID/LEVOTHROID) 200 MCG tablet; TAKE 1 TABLET BY MOUTH DAILY 5 days a week EXCEPT ON SUNDAYS and WEDNESDAY only take 1/2 TABLET  Dispense: 90 tablet; Refill: 1     BMI:   Estimated body mass index is 30.29 kg/m  as calculated from the following:    Height as of this encounter: 1.6 m (5' 3\").    Weight as of this encounter: 77.6 kg (171 lb).     Return in about 2 months (around 9/8/2019), or if symptoms worsen or fail to improve.    RITESH Levy, NP-C  [facility]    This chart was documented by provider using a voice activated software called Dragon in addition to manual typing. There may be vocabulary errors or other grammatical errors due to this.       "

## 2019-07-08 NOTE — TELEPHONE ENCOUNTER
PN  Please see message below.  Spoke with patient.   Informed her you are out of office until Friday 7/12.    See lab results from today's office visit with Yuli Sauer NP.  Sed rate 99.  TSH 0.24 - NP asking her to adjust her dose. Wants to make sure you agree with the change  Concerned because she has hx of vasculitis.    Future labs ordered by NP for recheck Sed rate, TSH (told patient would be best to check after she is on dose change for about 6 weeks) . RICHARD also future ordered.    Do you want to see her for follow up?  Thanks,  Emerita Farrell, AMARJIT

## 2019-07-11 DIAGNOSIS — Z86.79 HISTORY OF VASCULITIS: ICD-10-CM

## 2019-07-11 DIAGNOSIS — E03.9 HYPOTHYROIDISM, UNSPECIFIED TYPE: ICD-10-CM

## 2019-07-11 LAB
CRP SERPL-MCNC: 4.3 MG/L (ref 0–8)
ERYTHROCYTE [SEDIMENTATION RATE] IN BLOOD BY WESTERGREN METHOD: 97 MM/H (ref 0–20)

## 2019-07-11 PROCEDURE — 86038 ANTINUCLEAR ANTIBODIES: CPT | Performed by: NURSE PRACTITIONER

## 2019-07-11 PROCEDURE — 36415 COLL VENOUS BLD VENIPUNCTURE: CPT | Performed by: NURSE PRACTITIONER

## 2019-07-11 PROCEDURE — 86039 ANTINUCLEAR ANTIBODIES (ANA): CPT | Performed by: NURSE PRACTITIONER

## 2019-07-11 PROCEDURE — 85652 RBC SED RATE AUTOMATED: CPT | Performed by: NURSE PRACTITIONER

## 2019-07-11 PROCEDURE — 86140 C-REACTIVE PROTEIN: CPT | Performed by: NURSE PRACTITIONER

## 2019-07-12 ENCOUNTER — TELEPHONE (OUTPATIENT)
Dept: FAMILY MEDICINE | Facility: CLINIC | Age: 44
End: 2019-07-12

## 2019-07-12 DIAGNOSIS — R82.90 NONSPECIFIC FINDING ON EXAMINATION OF URINE: Primary | ICD-10-CM

## 2019-07-12 DIAGNOSIS — I77.6 VASCULITIS (H): ICD-10-CM

## 2019-07-12 LAB
ALBUMIN UR-MCNC: NEGATIVE MG/DL
ANA PAT SER IF-IMP: ABNORMAL
ANA SER QL IF: ABNORMAL
ANA TITR SER IF: ABNORMAL {TITER}
APPEARANCE UR: CLEAR
BACTERIA #/AREA URNS HPF: ABNORMAL /HPF
BILIRUB UR QL STRIP: NEGATIVE
COLOR UR AUTO: YELLOW
ERYTHROCYTE [DISTWIDTH] IN BLOOD BY AUTOMATED COUNT: 14.8 % (ref 10–15)
GLUCOSE UR STRIP-MCNC: NEGATIVE MG/DL
HCT VFR BLD AUTO: 38 % (ref 35–47)
HGB BLD-MCNC: 12.4 G/DL (ref 11.7–15.7)
HGB UR QL STRIP: ABNORMAL
KETONES UR STRIP-MCNC: NEGATIVE MG/DL
LEUKOCYTE ESTERASE UR QL STRIP: ABNORMAL
MCH RBC QN AUTO: 28 PG (ref 26.5–33)
MCHC RBC AUTO-ENTMCNC: 32.6 G/DL (ref 31.5–36.5)
MCV RBC AUTO: 86 FL (ref 78–100)
NITRATE UR QL: NEGATIVE
NON-SQ EPI CELLS #/AREA URNS LPF: ABNORMAL /LPF
PH UR STRIP: 6.5 PH (ref 5–7)
PLATELET # BLD AUTO: 254 10E9/L (ref 150–450)
RBC # BLD AUTO: 4.43 10E12/L (ref 3.8–5.2)
RBC #/AREA URNS AUTO: ABNORMAL /HPF
SOURCE: ABNORMAL
SP GR UR STRIP: 1.01 (ref 1–1.03)
TRANS CELLS #/AREA URNS HPF: ABNORMAL /HPF
UROBILINOGEN UR STRIP-ACNC: 0.2 EU/DL (ref 0.2–1)
WBC # BLD AUTO: 8.6 10E9/L (ref 4–11)
WBC #/AREA URNS AUTO: ABNORMAL /HPF

## 2019-07-12 PROCEDURE — 36415 COLL VENOUS BLD VENIPUNCTURE: CPT | Performed by: NURSE PRACTITIONER

## 2019-07-12 PROCEDURE — 87086 URINE CULTURE/COLONY COUNT: CPT | Performed by: NURSE PRACTITIONER

## 2019-07-12 PROCEDURE — 85027 COMPLETE CBC AUTOMATED: CPT | Performed by: NURSE PRACTITIONER

## 2019-07-12 PROCEDURE — 81001 URINALYSIS AUTO W/SCOPE: CPT | Performed by: NURSE PRACTITIONER

## 2019-07-12 NOTE — TELEPHONE ENCOUNTER
I agree with the dose change for the thyroid -   Agree that she will need to have the sed rate and CRP rechecked.  If the rash is getting worse, let me know.  Might be worth seeing her - can give her a 15 minute hold slot (if any?) or I can also double book.  This could also be a result of the strep infection she had a few months -   We may need to have her see rheumatology again.    Thanks  PN

## 2019-07-12 NOTE — TELEPHONE ENCOUNTER
This writer attempted to contact Dr Trejo's phone number:  [phone] on 07/12/19 at 8:09am. They are open 8:30am on Fridays to around noon.  Will try the office again once it is open to talk to Dr. Trejo or the on-call provider.     RITESH Levy, NP-C  [facility]

## 2019-07-12 NOTE — TELEPHONE ENCOUNTER
PN,    Informed pt below.    Pt had CRP and Sed Rate recked yesterday at [facility] and has been communicating with the NP there via [employer].  CRP normal but Sed Rate still elevated.  Pt says she has been updating her rheum provider and she says NP was going to reach out to pt's Rheum provider also to see if pt needs to be seen with them.    Pt says rash has been worsening, really bad on palms and scalp and spreading to elbows ect (full body rash per pt).  Painful hand- she wonders if it is joint pain or from the rash.    She is wanting to know if this was from previous strep infection in may, would any medication help today?    She can come in and see you today if you like-.    Please advise.  Thanks,  Antoinette Cruz RN      Discussed above with PN and as long as communicating with Rheum, that is the next step, no need to come in here.  Informed pt.

## 2019-07-12 NOTE — TELEPHONE ENCOUNTER
NP called Dr. Trejo's office, was able to talk directly to Dr. Trejo. Last time he saw patient was about 10 years ago. He advised checking CBC and UA. Also starting some prednisone: 40 mg daily for now. Then he would like to see patient in a week or so. She can call to set that up.     NP will update patient by Mychart.     STAFF: Please fax over NP last note from 7/8/19,  lab results from 7/8/19 and 7/11/19 to Dr. Trejo at fax#: [phone]    Thank you,  RITESH Levy, NP-C  [facility]

## 2019-07-12 NOTE — TELEPHONE ENCOUNTER
Last office note from 7/8/19,  lab results from 7/8/19 and 7/11/19 faxed to Dr. Trejo at fax#: [phone]    Joyce COOPER (R))

## 2019-07-13 LAB
BACTERIA SPEC CULT: NORMAL
SPECIMEN SOURCE: NORMAL

## 2019-08-26 NOTE — TELEPHONE ENCOUNTER
Prescription approved per [facility] Refill Protocol.  Silvana MARES RN     97.1

## 2019-10-04 ENCOUNTER — HEALTH MAINTENANCE LETTER (OUTPATIENT)
Age: 44
End: 2019-10-04

## 2019-10-18 ENCOUNTER — TRANSFERRED RECORDS (OUTPATIENT)
Dept: HEALTH INFORMATION MANAGEMENT | Facility: CLINIC | Age: 44
End: 2019-10-18

## 2020-02-28 DIAGNOSIS — F41.1 GAD (GENERALIZED ANXIETY DISORDER): ICD-10-CM

## 2020-02-28 NOTE — TELEPHONE ENCOUNTER
"SERTRALINE 50MG TABLETS  Last Written Prescription Date:  01/23/2019  Last Fill Quantity: 90,  # refills: 3   Last office visit: 7/8/2019 with prescribing provider:  NATHAN   Future Office Visit:  Nothing at this time scheduled.    Requested Prescriptions   Pending Prescriptions Disp Refills     sertraline (ZOLOFT) 50 MG tablet [Pharmacy Med Name: SERTRALINE 50MG TABLETS] 90 tablet 3     Sig: TAKE 1 TABLET(50 MG) BY MOUTH DAILY       SSRIs Protocol Failed - 2/28/2020  7:34 AM        Failed - Recent (12 mo) or future (30 days) visit within the authorizing provider's specialty     Patient has had an office visit with the authorizing provider or a provider within the authorizing providers department within the previous 12 mos or has a future within next 30 days. See \"Patient Info\" tab in inbasket, or \"Choose Columns\" in Meds & Orders section of the refill encounter.              Passed - Medication is active on med list        Passed - Patient is age 18 or older        Passed - No active pregnancy on record        Passed - No positive pregnancy test in last 12 months        "

## 2020-03-02 ENCOUNTER — MYC MEDICAL ADVICE (OUTPATIENT)
Dept: FAMILY MEDICINE | Facility: CLINIC | Age: 45
End: 2020-03-02

## 2020-03-02 NOTE — TELEPHONE ENCOUNTER
Patient last saw PN 1/23/19  OV 8/2019 was with outside provider    Medication is being filled for 1 time refill only due to:  Patient needs to be seen because it has been more than one year since last visit.     Smore message sent to patient asking her to schedule an appointment.    Emerita Farrell RN

## 2020-04-10 ENCOUNTER — VIRTUAL VISIT (OUTPATIENT)
Dept: FAMILY MEDICINE | Facility: CLINIC | Age: 45
End: 2020-04-10
Payer: COMMERCIAL

## 2020-04-10 DIAGNOSIS — L40.9 PSORIASIS: ICD-10-CM

## 2020-04-10 DIAGNOSIS — E03.9 HYPOTHYROIDISM, UNSPECIFIED TYPE: ICD-10-CM

## 2020-04-10 DIAGNOSIS — F41.1 GAD (GENERALIZED ANXIETY DISORDER): ICD-10-CM

## 2020-04-10 PROCEDURE — 99214 OFFICE O/P EST MOD 30 MIN: CPT | Mod: 95 | Performed by: FAMILY MEDICINE

## 2020-04-10 PROCEDURE — 96127 BRIEF EMOTIONAL/BEHAV ASSMT: CPT | Mod: 95 | Performed by: FAMILY MEDICINE

## 2020-04-10 RX ORDER — LEVOTHYROXINE SODIUM 175 UG/1
TABLET ORAL
Qty: 90 TABLET | Refills: 0 | Status: SHIPPED | OUTPATIENT
Start: 2020-04-10 | End: 2020-06-05

## 2020-04-10 ASSESSMENT — ANXIETY QUESTIONNAIRES
3. WORRYING TOO MUCH ABOUT DIFFERENT THINGS: SEVERAL DAYS
IF YOU CHECKED OFF ANY PROBLEMS ON THIS QUESTIONNAIRE, HOW DIFFICULT HAVE THESE PROBLEMS MADE IT FOR YOU TO DO YOUR WORK, TAKE CARE OF THINGS AT HOME, OR GET ALONG WITH OTHER PEOPLE: NOT DIFFICULT AT ALL
1. FEELING NERVOUS, ANXIOUS, OR ON EDGE: SEVERAL DAYS
GAD7 TOTAL SCORE: 7
5. BEING SO RESTLESS THAT IT IS HARD TO SIT STILL: NOT AT ALL
6. BECOMING EASILY ANNOYED OR IRRITABLE: MORE THAN HALF THE DAYS
7. FEELING AFRAID AS IF SOMETHING AWFUL MIGHT HAPPEN: SEVERAL DAYS
2. NOT BEING ABLE TO STOP OR CONTROL WORRYING: SEVERAL DAYS

## 2020-04-10 ASSESSMENT — ASTHMA QUESTIONNAIRES

## 2020-04-10 ASSESSMENT — PATIENT HEALTH QUESTIONNAIRE - PHQ9: 5. POOR APPETITE OR OVEREATING: SEVERAL DAYS

## 2020-04-10 NOTE — PROGRESS NOTES
"Subjective     Audra Parada is a 44 year old female who is being evaluated via a billable telephone visit.      The patient has been notified of following:     \"This telephone visit will be conducted via a call between you and your physician/provider. We have found that certain health care needs can be provided without the need for a physical exam.  This service lets us provide the care you need with a short phone conversation.  If a prescription is necessary we can send it directly to your pharmacy.  If lab work is needed we can place an order for that and you can then stop by our lab to have the test done at a later time.    Telephone visits are billed at different rates depending on your insurance coverage. During this emergency period, for some insurers they may be billed the same as an in-person visit.  Please reach out to your insurance provider with any questions.    If during the course of the call the physician/provider feels a telephone visit is not appropriate, you will not be charged for this service.\"    Patient has given verbal consent for Telephone visit?  Yes    How would you like to obtain your AVS? Mail a copy    Audra Parada complains of   Chief Complaint   Patient presents with     Anxiety     Thyroid Problem     Medication Reconciliation       ALLERGIES  No known drug allergies    Anxiety Follow-Up    How are you doing with your anxiety since your last visit? Patient mentions that medication is making the anxiety stable    Are you having other symptoms that might be associated with anxiety? No    Have you had a significant life event? OTHER: started new enployments     Are you feeling depressed? No    Do you have any concerns with your use of alcohol or other drugs? No     Anxiety -   Before COVID was doing very well  Using sertraline 50mg once daily   Tolerating without side effects    Thyroid  Last week - had TSH checked at another clinic   July of last year -   Taking 200mcg daily except " Wed and Sund taking 100mg - total 1200 per week  Doesn't feel like any thyroid related symptoms  Doesn't normally feel major effects      Last summer was having rash -   Sed rate was high   Had peeling on hands  Rheumatologist and dermatologist  Thought it was flare of psoriasis after strep infection  Start Otezla in Sept - seems to help  Ran out of the medicaiton -   Has appt with dermatologist -   Off for a few days          Social History     Tobacco Use     Smoking status: Never Smoker     Smokeless tobacco: Never Used   Substance Use Topics     Alcohol use: Yes     Comment: 3-5/ week     Drug use: No     DIANA-7 SCORE 2/14/2018 1/23/2019 5/31/2019   Total Score 5 3 4     PHQ 2/14/2018 1/23/2019 5/31/2019   PHQ-9 Total Score 4 1 3   Q9: Thoughts of better off dead/self-harm past 2 weeks Not at all Not at all Not at all     DIANA-7  4/10/2020   1. Feeling nervous, anxious, or on edge 1   2. Not being able to stop or control worrying 1   3. Worrying too much about different things 1   4. Trouble relaxing 1   5. Being so restless that it is hard to sit still 0   6. Becoming easily annoyed or irritable 2   7. Feeling afraid, as if something awful might happen 1   DIANA-7 Total Score 7   If you checked any problems, how difficult have they made it for you to do your work, take care of things at home, or get along with other people? Not difficult at all         How many servings of fruits and vegetables do you eat daily?  2-3    On average, how many sweetened beverages do you drink each day (Examples: soda, juice, sweet tea, etc.  Do NOT count diet or artificially sweetened beverages)?   0  coffee with cream a day    How many days per week do you exercise enough to make your heart beat faster? 6    How many minutes a day do you exercise enough to make your heart beat faster? 30 - 60    How many days per week do you miss taking your medication? 0      Patient Active Problem List   Diagnosis     Hypothyroidism     Contact  dermatitis and other eczema, due to unspecified cause     Abnormal glandular Papanicolaou smear of cervix     Polyarteritis nodosa (H)     Bladder cancer (H)     CARDIOVASCULAR SCREENING; LDL GOAL LESS THAN 160     Recurrent pregnancy loss     DIANA (generalized anxiety disorder)     Mild intermittent asthma with acute exacerbation     Past Surgical History:   Procedure Laterality Date     C INCISE/FULGUR LESN BLADDER  8/31/07    non-invasive Gr I bladder tumor     C NONSPECIFIC PROCEDURE  3/03    Colposcopy       Social History     Tobacco Use     Smoking status: Never Smoker     Smokeless tobacco: Never Used   Substance Use Topics     Alcohol use: Yes     Comment: 3-5/ week     Family History   Problem Relation Age of Onset     Diabetes Father         adult onset-using insulin     Hypertension Mother      C.A.D. Maternal Grandmother         CABG at 63     Alcohol/Drug Maternal Grandfather      C.A.D. Paternal Grandmother         MI--70's-80's     Respiratory Paternal Grandfather         asthma           Reviewed and updated as needed this visit by Provider         Review of Systems   ROS COMP: Constitutional, HEENT, cardiovascular, pulmonary, GI, , musculoskeletal, neuro, skin, endocrine and psych systems are negative, except as otherwise noted.       Objective   Reported vitals:  There were no vitals taken for this visit.   alert and no distress  Psych: Alert and oriented times 3; coherent speech, normal   rate and volume, able to articulate logical thoughts, able   to abstract reason, no tangential thoughts, no hallucinations   or delusions  Her affect is normal3     Diagnostic Test Results:  Labs reviewed in Epic        Assessment/Plan:  1. Hypothyroidism, unspecified type  TSH was off last check -   She has 200mcg tablets - only taking 1/2 on Wed and Sun and 1 tablet other days for total of 1200mcg per week  Will switch to the 175mcg tablet - will have her take daily  Schedule a lab only visit in next 3 months  (depends on COVID infection )  - levothyroxine (SYNTHROID/LEVOTHROID) 175 MCG tablet; TAKE 1 TABLET BY MOUTH DAILY 5 days a week EXCEPT ON SUNDAYS and WEDNESDAY only take 1/2 TABLET  Dispense: 90 tablet; Refill: 0  - **TSH with free T4 reflex FUTURE anytime; Future    2. DIANA (generalized anxiety disorder)  Anxiety well controlled  Will refill med  - EMOTIONAL / BEHAVIORAL ASSESSMENT  - sertraline (ZOLOFT) 50 MG tablet; Take 1 tablet (50 mg) by mouth daily  Dispense: 90 tablet; Refill: 1    3. Psoriasis  Working with dermatology   Was started on Otezla last summer      No follow-ups on file.      Phone call duration:  11 minutes    Michelle Caldwell, DO

## 2020-04-11 ASSESSMENT — ASTHMA QUESTIONNAIRES: ACT_TOTALSCORE: 25

## 2020-04-11 ASSESSMENT — ANXIETY QUESTIONNAIRES: GAD7 TOTAL SCORE: 7

## 2020-06-03 DIAGNOSIS — E03.9 HYPOTHYROIDISM, UNSPECIFIED TYPE: ICD-10-CM

## 2020-06-03 LAB
T4 FREE SERPL-MCNC: 1.54 NG/DL (ref 0.76–1.46)
TSH SERPL DL<=0.005 MIU/L-ACNC: <0.01 MU/L (ref 0.4–4)

## 2020-06-03 PROCEDURE — 84443 ASSAY THYROID STIM HORMONE: CPT | Performed by: FAMILY MEDICINE

## 2020-06-03 PROCEDURE — 84439 ASSAY OF FREE THYROXINE: CPT | Performed by: FAMILY MEDICINE

## 2020-06-03 PROCEDURE — 36415 COLL VENOUS BLD VENIPUNCTURE: CPT | Performed by: FAMILY MEDICINE

## 2020-06-05 ENCOUNTER — MYC MEDICAL ADVICE (OUTPATIENT)
Dept: FAMILY MEDICINE | Facility: CLINIC | Age: 45
End: 2020-06-05

## 2020-06-05 DIAGNOSIS — E03.9 HYPOTHYROIDISM, UNSPECIFIED TYPE: ICD-10-CM

## 2020-06-05 RX ORDER — LEVOTHYROXINE SODIUM 150 UG/1
150 TABLET ORAL DAILY
Qty: 30 TABLET | Refills: 1 | Status: SHIPPED | OUTPATIENT
Start: 2020-06-05 | End: 2020-07-23

## 2020-06-05 NOTE — RESULT ENCOUNTER NOTE
Dear Audra,   Your test results are all back -   Looks like you are getting too much thyroid medication at this time.  We need to cut your dose back.  Reminding what you are taking so I can make an adjustment.  Let us know if you have any questions.  -Michelle Caldwell, DO

## 2020-07-17 DIAGNOSIS — E03.9 HYPOTHYROIDISM, UNSPECIFIED TYPE: ICD-10-CM

## 2020-07-17 NOTE — TELEPHONE ENCOUNTER
Due for TSH   Will get lab at [place] location next week  Has about 1 wk of medication left  Will address refill once TSH back  Silvana MARES RN

## 2020-07-20 DIAGNOSIS — E03.9 HYPOTHYROIDISM, UNSPECIFIED TYPE: ICD-10-CM

## 2020-07-20 PROCEDURE — 84443 ASSAY THYROID STIM HORMONE: CPT | Performed by: FAMILY MEDICINE

## 2020-07-20 PROCEDURE — 84439 ASSAY OF FREE THYROXINE: CPT | Performed by: FAMILY MEDICINE

## 2020-07-20 PROCEDURE — 36415 COLL VENOUS BLD VENIPUNCTURE: CPT | Performed by: FAMILY MEDICINE

## 2020-07-21 LAB
T4 FREE SERPL-MCNC: 1.76 NG/DL (ref 0.76–1.46)
TSH SERPL DL<=0.005 MIU/L-ACNC: <0.01 MU/L (ref 0.4–4)

## 2020-07-22 ENCOUNTER — MYC MEDICAL ADVICE (OUTPATIENT)
Dept: FAMILY MEDICINE | Facility: CLINIC | Age: 45
End: 2020-07-22

## 2020-07-22 DIAGNOSIS — E03.9 HYPOTHYROIDISM, UNSPECIFIED TYPE: ICD-10-CM

## 2020-07-22 NOTE — RESULT ENCOUNTER NOTE
Dear Audra,   Your test results are all back -   Looks like you are getting too much of the thyroid medication - we needed to lower the dose.  Remind me what dose you have been taking and if you had missed any doses.  Let us know if you have any questions.  -Michelle Caldwell, DO

## 2020-07-23 RX ORDER — LEVOTHYROXINE SODIUM 137 UG/1
137 TABLET ORAL DAILY
Qty: 30 TABLET | Refills: 1 | Status: SHIPPED | OUTPATIENT
Start: 2020-07-23 | End: 2020-09-16

## 2020-07-24 RX ORDER — LEVOTHYROXINE SODIUM 150 UG/1
TABLET ORAL
Start: 2020-07-24

## 2020-07-29 ENCOUNTER — TRANSFERRED RECORDS (OUTPATIENT)
Dept: HEALTH INFORMATION MANAGEMENT | Facility: CLINIC | Age: 45
End: 2020-07-29

## 2020-08-25 ENCOUNTER — TRANSFERRED RECORDS (OUTPATIENT)
Dept: HEALTH INFORMATION MANAGEMENT | Facility: CLINIC | Age: 45
End: 2020-08-25

## 2020-08-25 LAB — PAP SMEAR - HIM PATIENT REPORTED: NORMAL

## 2020-09-15 DIAGNOSIS — E03.9 HYPOTHYROIDISM, UNSPECIFIED TYPE: ICD-10-CM

## 2020-09-16 RX ORDER — LEVOTHYROXINE SODIUM 137 UG/1
TABLET ORAL
Qty: 30 TABLET | Refills: 1 | Status: SHIPPED | OUTPATIENT
Start: 2020-09-16 | End: 2020-09-23

## 2020-09-18 DIAGNOSIS — E03.9 HYPOTHYROIDISM, UNSPECIFIED TYPE: ICD-10-CM

## 2020-09-18 LAB
T4 FREE SERPL-MCNC: 1.22 NG/DL (ref 0.76–1.46)
TSH SERPL DL<=0.005 MIU/L-ACNC: 0.21 MU/L (ref 0.4–4)

## 2020-09-18 PROCEDURE — 84439 ASSAY OF FREE THYROXINE: CPT | Performed by: FAMILY MEDICINE

## 2020-09-18 PROCEDURE — 36415 COLL VENOUS BLD VENIPUNCTURE: CPT | Performed by: FAMILY MEDICINE

## 2020-09-18 PROCEDURE — 84443 ASSAY THYROID STIM HORMONE: CPT | Performed by: FAMILY MEDICINE

## 2020-09-23 ENCOUNTER — MYC MEDICAL ADVICE (OUTPATIENT)
Dept: FAMILY MEDICINE | Facility: CLINIC | Age: 45
End: 2020-09-23

## 2020-09-23 DIAGNOSIS — E03.9 HYPOTHYROIDISM, UNSPECIFIED TYPE: ICD-10-CM

## 2020-09-23 RX ORDER — LEVOTHYROXINE SODIUM 125 UG/1
125 TABLET ORAL DAILY
Qty: 30 TABLET | Refills: 1 | Status: SHIPPED | OUTPATIENT
Start: 2020-09-23 | End: 2020-11-19

## 2020-09-23 NOTE — TELEPHONE ENCOUNTER
PN,  Please see below MyChart message and advise.  TSH abnormal   T4 normal  Pended pharmacy if needed  Thanks,  Silvana MARES RN

## 2020-09-29 ENCOUNTER — HOSPITAL PATHOLOGY (OUTPATIENT)
Dept: OTHER | Facility: CLINIC | Age: 45
End: 2020-09-29

## 2020-10-01 LAB — COPATH REPORT: NORMAL

## 2020-10-06 ENCOUNTER — TRANSFERRED RECORDS (OUTPATIENT)
Dept: HEALTH INFORMATION MANAGEMENT | Facility: CLINIC | Age: 45
End: 2020-10-06

## 2020-11-08 ENCOUNTER — HEALTH MAINTENANCE LETTER (OUTPATIENT)
Age: 45
End: 2020-11-08

## 2020-11-18 DIAGNOSIS — E03.9 HYPOTHYROIDISM, UNSPECIFIED TYPE: ICD-10-CM

## 2020-11-19 RX ORDER — LEVOTHYROXINE SODIUM 125 UG/1
125 TABLET ORAL DAILY
Qty: 30 TABLET | Refills: 0 | Status: SHIPPED | OUTPATIENT
Start: 2020-11-19 | End: 2020-11-24

## 2020-11-19 NOTE — TELEPHONE ENCOUNTER
Medication is being filled for 1 time refill only due to:  Patient is due for thyroid labs, re-check.

## 2020-11-20 DIAGNOSIS — E03.9 HYPOTHYROIDISM, UNSPECIFIED TYPE: ICD-10-CM

## 2020-11-20 PROCEDURE — 84439 ASSAY OF FREE THYROXINE: CPT | Performed by: FAMILY MEDICINE

## 2020-11-20 PROCEDURE — 36415 COLL VENOUS BLD VENIPUNCTURE: CPT | Performed by: FAMILY MEDICINE

## 2020-11-20 PROCEDURE — 84443 ASSAY THYROID STIM HORMONE: CPT | Performed by: FAMILY MEDICINE

## 2020-11-21 LAB
T4 FREE SERPL-MCNC: 1.27 NG/DL (ref 0.76–1.46)
TSH SERPL DL<=0.005 MIU/L-ACNC: 0.32 MU/L (ref 0.4–4)

## 2020-11-23 ENCOUNTER — MYC MEDICAL ADVICE (OUTPATIENT)
Dept: FAMILY MEDICINE | Facility: CLINIC | Age: 45
End: 2020-11-23

## 2020-11-23 DIAGNOSIS — E03.9 HYPOTHYROIDISM, UNSPECIFIED TYPE: ICD-10-CM

## 2020-11-24 RX ORDER — LEVOTHYROXINE SODIUM 125 UG/1
125 TABLET ORAL DAILY
Qty: 30 TABLET | Refills: 1 | Status: SHIPPED | OUTPATIENT
Start: 2020-11-24 | End: 2021-02-23

## 2021-01-15 ENCOUNTER — HEALTH MAINTENANCE LETTER (OUTPATIENT)
Age: 46
End: 2021-01-15

## 2021-01-31 ENCOUNTER — HEALTH MAINTENANCE LETTER (OUTPATIENT)
Age: 46
End: 2021-01-31

## 2021-02-11 ENCOUNTER — OFFICE VISIT (OUTPATIENT)
Dept: FAMILY MEDICINE | Facility: CLINIC | Age: 46
End: 2021-02-11
Payer: COMMERCIAL

## 2021-02-11 ENCOUNTER — ANCILLARY PROCEDURE (OUTPATIENT)
Dept: GENERAL RADIOLOGY | Facility: CLINIC | Age: 46
End: 2021-02-11
Attending: PHYSICIAN ASSISTANT
Payer: COMMERCIAL

## 2021-02-11 VITALS
TEMPERATURE: 100 F | OXYGEN SATURATION: 99 % | SYSTOLIC BLOOD PRESSURE: 115 MMHG | BODY MASS INDEX: 28 KG/M2 | RESPIRATION RATE: 16 BRPM | WEIGHT: 158 LBS | HEIGHT: 63 IN | HEART RATE: 84 BPM | DIASTOLIC BLOOD PRESSURE: 85 MMHG

## 2021-02-11 DIAGNOSIS — J10.1 INFLUENZA B: Primary | ICD-10-CM

## 2021-02-11 DIAGNOSIS — E03.9 HYPOTHYROIDISM, UNSPECIFIED TYPE: ICD-10-CM

## 2021-02-11 DIAGNOSIS — J06.9 ACUTE URI: ICD-10-CM

## 2021-02-11 LAB
BASOPHILS # BLD AUTO: 0 10E9/L (ref 0–0.2)
BASOPHILS NFR BLD AUTO: 0 %
DEPRECATED S PYO AG THROAT QL EIA: NEGATIVE
DIFFERENTIAL METHOD BLD: NORMAL
EOSINOPHIL # BLD AUTO: 0 10E9/L (ref 0–0.7)
EOSINOPHIL NFR BLD AUTO: 0.5 %
ERYTHROCYTE [DISTWIDTH] IN BLOOD BY AUTOMATED COUNT: 13.8 % (ref 10–15)
FLUAV+FLUBV AG SPEC QL: NEGATIVE
FLUAV+FLUBV AG SPEC QL: POSITIVE
HCT VFR BLD AUTO: 40.5 % (ref 35–47)
HGB BLD-MCNC: 13.2 G/DL (ref 11.7–15.7)
LYMPHOCYTES # BLD AUTO: 0.9 10E9/L (ref 0.8–5.3)
LYMPHOCYTES NFR BLD AUTO: 20.1 %
MCH RBC QN AUTO: 28.1 PG (ref 26.5–33)
MCHC RBC AUTO-ENTMCNC: 32.6 G/DL (ref 31.5–36.5)
MCV RBC AUTO: 86 FL (ref 78–100)
MONOCYTES # BLD AUTO: 0.3 10E9/L (ref 0–1.3)
MONOCYTES NFR BLD AUTO: 6.3 %
NEUTROPHILS # BLD AUTO: 3.2 10E9/L (ref 1.6–8.3)
NEUTROPHILS NFR BLD AUTO: 73.1 %
PLATELET # BLD AUTO: 192 10E9/L (ref 150–450)
RBC # BLD AUTO: 4.69 10E12/L (ref 3.8–5.2)
SPECIMEN SOURCE: ABNORMAL
SPECIMEN SOURCE: NORMAL
SPECIMEN SOURCE: NORMAL
STREP GROUP A PCR: NOT DETECTED
WBC # BLD AUTO: 4.4 10E9/L (ref 4–11)

## 2021-02-11 PROCEDURE — 99N1174 PR STATISTIC STREP A RAPID: Performed by: PHYSICIAN ASSISTANT

## 2021-02-11 PROCEDURE — 85025 COMPLETE CBC W/AUTO DIFF WBC: CPT | Performed by: PHYSICIAN ASSISTANT

## 2021-02-11 PROCEDURE — 87804 INFLUENZA ASSAY W/OPTIC: CPT | Performed by: PHYSICIAN ASSISTANT

## 2021-02-11 PROCEDURE — 71046 X-RAY EXAM CHEST 2 VIEWS: CPT | Mod: FY | Performed by: RADIOLOGY

## 2021-02-11 PROCEDURE — 84443 ASSAY THYROID STIM HORMONE: CPT | Performed by: PHYSICIAN ASSISTANT

## 2021-02-11 PROCEDURE — 87651 STREP A DNA AMP PROBE: CPT | Performed by: PHYSICIAN ASSISTANT

## 2021-02-11 PROCEDURE — 36415 COLL VENOUS BLD VENIPUNCTURE: CPT | Performed by: PHYSICIAN ASSISTANT

## 2021-02-11 PROCEDURE — 99214 OFFICE O/P EST MOD 30 MIN: CPT | Performed by: PHYSICIAN ASSISTANT

## 2021-02-11 RX ORDER — OSELTAMIVIR PHOSPHATE 75 MG/1
75 CAPSULE ORAL 2 TIMES DAILY
Qty: 10 CAPSULE | Refills: 0 | Status: SHIPPED | OUTPATIENT
Start: 2021-02-11 | End: 2021-02-16

## 2021-02-11 ASSESSMENT — MIFFLIN-ST. JEOR: SCORE: 1320.81

## 2021-02-11 NOTE — PATIENT INSTRUCTIONS
We are working hard to begin vaccinating more people against COVID-19. Currently, we are only vaccinating Phase 1a workers - healthcare workers who are unable to do their job remotely. Vaccine availability is very limited.      If you are a healthcare worker and you are unable to do your job remotely, please log in to [employer] using this link to see if we have openings and schedule an appointment. At your vaccine appointment, you will be asked to provide proof of employment as a health care worker. If you cannot, you will be turned away.     Vaccine appointments are being added as they become available. Please check your [employer] account frequently for availability.  If you have technical difficulty using [employer], call [phone] for assistance.     You can learn more about the state's phased approach to administering the vaccine, with details on each phase, here.      Phase 1b is the next group that will get vaccinated and includes frontline essential workers and adults 75 years of age and older. When we are able to start vaccinating this group, we will share that information on our website. Check this website to stay up to date on COVID-19 vaccination information.        Did you know?      You can schedule a video visit for follow-up appointments as well as future appointments for certain conditions.  Please see the below link.     [email or web address]    If you have not already done so,  I encourage you to sign up for [employer] ([email or web address]).  This will allow you to review your results, securely communicate with a provider, and schedule virtual visits as well.  Patient Education     The Flu (Influenza)  Updated for the 1825-5367 flu season  The flu (influenza) is a viral infection that affects your respiratory tract. The respiratory tract is made up of your mouth, nose, and lungs, and the passages between them. Unlike a cold, the flu can make you very ill.  It may lead to pneumonia, a serious lung infection. The flu can have serious complications and even cause death.      Viruses that cause influenza spread through the air in droplets when someone who has the flu coughs, sneezes, laughs, or talks.   Who is at risk for the flu?  Anyone can get the flu. But you are more likely to become infected if you:     Have a weak immune system    Work in a healthcare setting where you may be exposed to flu germs    Live or work with someone who has the flu    Haven t had the flu vaccine as advised  How does the flu spread?  The flu is caused by a virus. The virus spreads through the air in droplets when someone who has the flu coughs, sneezes, laughs, or talks. You can become infected when you inhale these viruses directly. You can also become infected when you touch a surface on which the droplets have landed and then transfer the germs to your eyes, nose, or mouth. Touching used tissues, or sharing utensils, drinking glasses, or a toothbrush from an infected person can expose you to flu viruses, too.   What are the symptoms of the flu?  Flu symptoms tend to come on quickly and may last a few days to a few weeks. They include:     Fever usually higher than 100.4  F  ( 38 C ) and chills    Sore throat and headache    Dry cough    Runny nose    Tiredness and weakness    Muscle aches  Who is at risk for flu complications?  For some people, the flu can be very serious. The risk for complications is greater for:     Children younger than age 5    Adults ages 65 and older    People with a chronic illness such as diabetes or heart, kidney, or lung disease    People with a weak immune system such as those with HIV, AIDS, or cancer,or those who have had a transplant or are taking immune suppressing medicines    People who live in a nursing home or long-term care facility  How is the flu treated?  The flu usually gets better after 7 days or so. In some cases, your healthcare provider may  prescribe an antiviral medicine. This may help you get well sooner. It also may reduce the risk for and severity of complications. For the medicine to help, you need to take it as soon as possible (ideally within 48 hours) after your symptoms start.   If you develop pneumonia or other serious illness, you may need to stay in the hospital.   Easing flu symptoms    Drink lots of fluids such as water, juice, and warm soup. A good rule is to drink enough so that you urinate your normal amount.    Get plenty of rest.    Ask your healthcare provider what to take for fever and pain. Don't give aspirin to children under 18 years of age. It can cause the serious illness Reye syndrome.    Call your provider if your fever is 100.4  F ( 38 C ) or higher, or you become dizzy, lightheaded, or short of breath.    Taking steps to protect others    Wash your hands often, especially after coughing or sneezing. Or clean your hands with an alcohol-based hand  containing at least 60% alcohol.    Cough or sneeze into a tissue. Then throw the tissue away and wash your hands. If you don t have a tissue, cough and sneeze into your elbow.    Stay home until at least 24 hours after you no longer have a fever or chills. Be sure the fever isn t being hidden by fever-reducing medicine.    Don t share food, utensils, drinking glasses, or a toothbrush with others.    How can the flu be prevented?    One of the best ways to prevent the flu is to get a flu vaccine each year. The CDC and American Academy of Pediatrics recommend that all people 6 months of age and older get a flu vaccine every year. This includes pregnant women. Healthcare providers advise getting the flu vaccine each year as soon as it's available in your area.    Flu virus strains change from year to year, so the vaccine changes yearly to help prevent flu viruses predicted to cause illness during the upcoming flu season. For the 4404-5849 influenza season, the vaccine is  available in different forms. It's most often given as a shot into the muscle. A nasal spray is available for healthy, non-pregnant people between ages 2 and 49 years. A needle-free form called a jet injector delivers the vaccine through the skin into the muscle through a high-pressure stream. This form may be an option for some people ages 18 to 64. Your healthcare provider can tell you which vaccine is right for you.    Wash your hands often. Frequent handwashing is a proven way to help prevent the spread of infection.    Carry an alcohol-based hand gel containing at least 60% alcohol. Use it when you can't use soap and water. Then wash your hands as soon as you can.    Try not to touch your eyes, nose, or mouth.    At home and work, clean phones, computer keyboards, and toys often with disinfectant wipes.    If possible, don't have close contact with others who have the flu or symptoms of the flu.    Handwashing tips  Handwashing is one of the best ways to prevent many common infections. If you are caring for or visiting someone with the flu, wash your hands each time you enter and leave the room. Follow these steps:     Use clean, running water and plenty of soap. Rub your hands together well.    Clean the whole hand, including under your nails, between your fingers, and up the wrists.    Wash for at least 20 seconds.    Rinse, letting the water run down your fingers, not up your wrists.    Dry your hands well. Use a paper towel to turn off the faucet and open the door.  Using alcohol-based hand   Alcohol-based hand  are also a good choice. Use them when you can't use soap and water. Follow these steps:     Apply enough of the cleanser on your hands to cover all surfaces.    Rub your hands together briskly, cleaning the backs of your hands, the palms, between your fingers, and up the wrists.    Rub until the gel is gone and your hands are completely dry.  Preventing the flu in healthcare settings    The flu is a special concern for people in hospitals and long-term care facilities. To help prevent the spread of flu, many hospitals and nursing homes take these steps:     Healthcare providers wash their hands or use an alcohol-based hand  before and after treating each patient.    People with the flu have private rooms and bathrooms or share a room with someone with the same infection.    People who are at high risk for the flu but don't have it are encouraged to get the flu and pneumonia vaccines.    All healthcare workers are encouraged or required to get flu shots.  [employer] last reviewed this educational content on 4/1/2020 2000-2020 [employer]. [address] [place] [address], [place] [address]. All rights reserved. This information is not intended as a substitute for professional medical care. Always follow your healthcare professional's instructions.

## 2021-02-11 NOTE — NURSING NOTE
"Chief Complaint   Patient presents with     Pharyngitis     initial /85 (BP Location: Left arm, Cuff Size: Adult Regular)   Pulse 84   Temp 100  F (37.8  C) (Tympanic)   Resp 16   Ht 1.6 m (5' 3\")   Wt 71.7 kg (158 lb)   SpO2 99%   BMI 27.99 kg/m   Estimated body mass index is 27.99 kg/m  as calculated from the following:    Height as of this encounter: 1.6 m (5' 3\").    Weight as of this encounter: 71.7 kg (158 lb).  BP completed using cuff size: regular.  Left  arm      Health Maintenance that is potentially due pending provider review:  NONE    n/a    Bola Pinto ma  "

## 2021-02-11 NOTE — PROGRESS NOTES
Assessment & Plan     Influenza B; Acute URI  Result in clinic positive for flu - treatment options discussed with patient at length. Return to clinic with any worsening or changes in symptoms and follow up with PCP for routine care.   - oseltamivir (TAMIFLU) 75 MG capsule; Take 1 capsule (75 mg) by mouth 2 times daily for 5 days  - XR Chest 2 Views  - Streptococcus A Rapid Scr w Reflx to PCR  - Influenza A/B antigen  - CBC with platelets differential  - Group A Streptococcus PCR Throat Swab    Hypothyroidism, unspecified type  Due to repeat levels, but hasn't taken medicine for a few days due to recent illness  - TSH with free T4 reflex    Review of the result(s) of each unique test - previous labs; current flu and cxr results  30 minutes spent on the date of the encounter doing chart review, history and exam, documentation and further activities as noted above       Patient Instructions       We are working hard to begin vaccinating more people against COVID-19. Currently, we are only vaccinating Phase 1a workers - healthcare workers who are unable to do their job remotely. Vaccine availability is very limited.      If you are a healthcare worker and you are unable to do your job remotely, please log in to [facility] using this link to see if we have openings and schedule an appointment. At your vaccine appointment, you will be asked to provide proof of employment as a health care worker. If you cannot, you will be turned away.     Vaccine appointments are being added as they become available. Please check your [facility] account frequently for availability.  If you have technical difficulty using [facility], call [phone] for assistance.     You can learn more about the state's phased approach to administering the vaccine, with details on each phase, here.      Phase 1b is the next group that will get vaccinated and includes frontline essential workers and adults 75 years of age and older. When we are able to  start vaccinating this group, we will share that information on our website. Check this website to stay up to date on COVID-19 vaccination information.        Did you know?      You can schedule a video visit for follow-up appointments as well as future appointments for certain conditions.  Please see the below link.     [email or web address]    If you have not already done so,  I encourage you to sign up for [employer] ([email or web address]).  This will allow you to review your results, securely communicate with a provider, and schedule virtual visits as well.      Return in about 3 months (around 5/11/2021) for Routine Visit, or sooner with worsening symptoms.    [facility]ARR[facility]LL [facility] is [facility] 45 year old who presents for the following health issues   HPI       Acute Illness  Acute illness concerns: Monday then progressed yesterday  Onset/Duration: sore throat starting Monday with slight upset stomach but then felt extreme fatigue and body aches as of yesterday  Symptoms:  Fever: YES  Chills/Sweats: no  Headache (location?): no  Sinus Pressure: YES  Conjunctivitis:  no  Ear Pain: no  Rhinorrhea: no  Congestion: YES, slight  Sore Throat: YES  Cough: no  Wheeze: no  Decreased Appetite: YES  Nausea: YES  Vomiting: no  Diarrhea: no  Dysuria/Freq.: no  Dysuria or Hematuria: no  Fatigue/Achiness: YES  Sick/Strep Exposure: no; son with stomach bug for a few days initially  Therapies tried and outcome: None  COVID test negative yesterday through [facility].    Hasn't been taking thyroid medicine in a few day though because has felt sick to her stomach.  Labs last done 11/20 and low.    Review of Systems   Constitutional, HEENT, cardiovascular, pulmonary, GI, , musculoskeletal, neuro, skin, endocrine and psych systems are negative, except as otherwise noted.      Objective    /85 (BP Location: Left arm, Cuff Size:  "Adult Regular)   Pulse 84   Temp 100  F (37.8  C) (Tympanic)   Resp 16   Ht 1.6 m (5' 3\")   Wt 71.7 kg (158 lb)   SpO2 99%   BMI 27.99 kg/m    Body mass index is 27.99 kg/m .  Physical Exam   GENERAL: healthy, alert and no distress  RESP: lungs clear to auscultation - no rales, rhonchi or wheezes  CV: regular rate and rhythm, normal S1 S2, no S3 or S4, no murmur, click or rub, no peripheral edema and peripheral pulses strong  MS: no gross musculoskeletal defects noted, no edema  PSYCH: mentation appears normal, affect normal/bright    CXR - Reviewed and interpreted by me Normal- no infiltrates, effusions, pneumothoraces, cardiomegaly or masses  awaiting formal interpretation from Radiologist at this time          "

## 2021-02-12 LAB — TSH SERPL DL<=0.005 MIU/L-ACNC: 0.56 MU/L (ref 0.4–4)

## 2021-02-12 NOTE — RESULT ENCOUNTER NOTE
"Severino Zhu  Your attached Complete Blood Count and thyroid levels are within normal limits.  Strep culture is still negative as well.  Please contact the office with any questions or concerns.    Libertad Calzada \"Yoni\" CARROLL Nance    "

## 2021-02-23 ENCOUNTER — OFFICE VISIT (OUTPATIENT)
Dept: FAMILY MEDICINE | Facility: CLINIC | Age: 46
End: 2021-02-23
Payer: COMMERCIAL

## 2021-02-23 VITALS
DIASTOLIC BLOOD PRESSURE: 82 MMHG | SYSTOLIC BLOOD PRESSURE: 115 MMHG | WEIGHT: 154 LBS | BODY MASS INDEX: 27.29 KG/M2 | HEIGHT: 63 IN | TEMPERATURE: 97.8 F | HEART RATE: 102 BPM | OXYGEN SATURATION: 97 % | RESPIRATION RATE: 16 BRPM

## 2021-02-23 DIAGNOSIS — R53.83 OTHER FATIGUE: ICD-10-CM

## 2021-02-23 DIAGNOSIS — G93.31 POST VIRAL SYNDROME: ICD-10-CM

## 2021-02-23 DIAGNOSIS — J02.9 SORE THROAT: Primary | ICD-10-CM

## 2021-02-23 DIAGNOSIS — E03.9 HYPOTHYROIDISM, UNSPECIFIED TYPE: ICD-10-CM

## 2021-02-23 LAB
BASOPHILS # BLD AUTO: 0 10E9/L (ref 0–0.2)
BASOPHILS NFR BLD AUTO: 0.3 %
DIFFERENTIAL METHOD BLD: NORMAL
EOSINOPHIL # BLD AUTO: 0.3 10E9/L (ref 0–0.7)
EOSINOPHIL NFR BLD AUTO: 4.6 %
ERYTHROCYTE [DISTWIDTH] IN BLOOD BY AUTOMATED COUNT: 13.5 % (ref 10–15)
HCT VFR BLD AUTO: 42 % (ref 35–47)
HETEROPH AB SER QL: NEGATIVE
HGB BLD-MCNC: 13.7 G/DL (ref 11.7–15.7)
LYMPHOCYTES # BLD AUTO: 1.8 10E9/L (ref 0.8–5.3)
LYMPHOCYTES NFR BLD AUTO: 29.2 %
MCH RBC QN AUTO: 28.1 PG (ref 26.5–33)
MCHC RBC AUTO-ENTMCNC: 32.6 G/DL (ref 31.5–36.5)
MCV RBC AUTO: 86 FL (ref 78–100)
MONOCYTES # BLD AUTO: 0.6 10E9/L (ref 0–1.3)
MONOCYTES NFR BLD AUTO: 9.3 %
NEUTROPHILS # BLD AUTO: 3.5 10E9/L (ref 1.6–8.3)
NEUTROPHILS NFR BLD AUTO: 56.6 %
PLATELET # BLD AUTO: 290 10E9/L (ref 150–450)
RBC # BLD AUTO: 4.88 10E12/L (ref 3.8–5.2)
WBC # BLD AUTO: 6.1 10E9/L (ref 4–11)

## 2021-02-23 PROCEDURE — 99213 OFFICE O/P EST LOW 20 MIN: CPT | Performed by: PHYSICIAN ASSISTANT

## 2021-02-23 PROCEDURE — 85025 COMPLETE CBC W/AUTO DIFF WBC: CPT | Performed by: PHYSICIAN ASSISTANT

## 2021-02-23 PROCEDURE — 36415 COLL VENOUS BLD VENIPUNCTURE: CPT | Performed by: PHYSICIAN ASSISTANT

## 2021-02-23 PROCEDURE — 86308 HETEROPHILE ANTIBODY SCREEN: CPT | Performed by: PHYSICIAN ASSISTANT

## 2021-02-23 RX ORDER — LEVOTHYROXINE SODIUM 125 UG/1
125 TABLET ORAL DAILY
Qty: 90 TABLET | Refills: 3 | Status: SHIPPED | OUTPATIENT
Start: 2021-02-23 | End: 2022-03-15

## 2021-02-23 RX ORDER — PREDNISONE 10 MG/1
10 TABLET ORAL DAILY
Qty: 5 TABLET | Refills: 0 | Status: SHIPPED | OUTPATIENT
Start: 2021-02-23 | End: 2021-02-28

## 2021-02-23 ASSESSMENT — ASTHMA QUESTIONNAIRES
QUESTION_3 LAST FOUR WEEKS HOW OFTEN DID YOUR ASTHMA SYMPTOMS (WHEEZING, COUGHING, SHORTNESS OF BREATH, CHEST TIGHTNESS OR PAIN) WAKE YOU UP AT NIGHT OR EARLIER THAN USUAL IN THE MORNING: NOT AT ALL
QUESTION_5 LAST FOUR WEEKS HOW WOULD YOU RATE YOUR ASTHMA CONTROL: COMPLETELY CONTROLLED
ACT_TOTALSCORE: 25
QUESTION_4 LAST FOUR WEEKS HOW OFTEN HAVE YOU USED YOUR RESCUE INHALER OR NEBULIZER MEDICATION (SUCH AS ALBUTEROL): NOT AT ALL
QUESTION_1 LAST FOUR WEEKS HOW MUCH OF THE TIME DID YOUR ASTHMA KEEP YOU FROM GETTING AS MUCH DONE AT WORK, SCHOOL OR AT HOME: NONE OF THE TIME
QUESTION_2 LAST FOUR WEEKS HOW OFTEN HAVE YOU HAD SHORTNESS OF BREATH: NOT AT ALL

## 2021-02-23 ASSESSMENT — MIFFLIN-ST. JEOR: SCORE: 1302.67

## 2021-02-23 NOTE — RESULT ENCOUNTER NOTE
Dear Audra    Your test results are attached, feel free to contact me via [employer]     Everything looks pretty good on your labs.  Most likely no active infection.  Mono test was negative.  Are you still interested in a couple of days of prednisone to see if we can shake these symptoms.      Jacobo Carroll PA-C

## 2021-02-23 NOTE — PROGRESS NOTES
"    Assessment & Plan     Sore throat    - CBC with platelets differential  - Mononucleosis screen    Other fatigue    - CBC with platelets differential  - Mononucleosis screen    Post viral syndrome  Discussed different options and she would like to try.  Has been on in the past, much higher doses for vasculitis, and tolerated fine.  - predniSONE (DELTASONE) 10 MG tablet; Take 1 tablet (10 mg) by mouth daily for 5 days             BMI:   Estimated body mass index is 27.28 kg/m  as calculated from the following:    Height as of this encounter: 1.6 m (5' 3\").    Weight as of this encounter: 69.9 kg (154 lb).           No follow-ups on file.    Brian Carroll PA-C  [facility] is a 45 year old who presents for the following health issues     HPI     Please abstract the following data from this visit with this patient into the appropriate field in Epic:    Tests that can be patient reported without a hard copy:    Mammogram done on this date: 06/08/2020 (approximately), by this group: [facility], results were normal.  and Pap smear done on this date: 06/08/2020 (approximately), by this group: [facility] [place], results were normal.             Acute Illness  Acute illness concerns: 2/11/2021 Pt was positive for Influenza B  Onset/Duration: 02/08/2021  Symptoms:  Fever: YES- earlier but now no fever  Chills/Sweats: no  Headache (location?): YES  Sinus Pressure: YES- sinus headache  Conjunctivitis:  no  Ear Pain: YES- pain and pressure   Rhinorrhea: no  Congestion: no  Sore Throat: YES  Cough: no  Wheeze: no  Decreased Appetite: YES  Nausea: YES  Vomiting: YES- but not recently but with tamiflu yes  Diarrhea: no  Dysuria/Freq.: no  Dysuria or Hematuria: no  Fatigue/Achiness: YES  Sick/Strep Exposure: pt was tested and it was negative   Therapies tried and outcome: tamiflu     Heading to [place] end of this week.  Just thought she would feel better by " "now.  Today is best day yet.    Review of Systems   Constitutional, HEENT, cardiovascular, pulmonary, gi and gu systems are negative, except as otherwise noted.      Objective    /82   Pulse 102   Temp 97.8  F (36.6  C) (Tympanic)   Resp 16   Ht 1.6 m (5' 3\")   Wt 69.9 kg (154 lb)   LMP 02/06/2021   SpO2 97%   BMI 27.28 kg/m    Body mass index is 27.28 kg/m .  Physical Exam   GENERAL: alert and no distress  EYES: Eyes grossly normal to inspection  HENT: ear canals and TM's normal, nose and mouth without ulcers or lesions  NECK: no adenopathy, no asymmetry, masses, or scars and thyroid normal to palpation  RESP: lungs clear to auscultation - no rales, rhonchi or wheezes  CV: regular rate and rhythm, normal S1 S2, no S3 or S4, no murmur, click or rub, no peripheral edema and peripheral pulses strong  PSYCH: mentation appears normal, affect normal/bright    Results for orders placed or performed in visit on 02/23/21 (from the past 24 hour(s))   CBC with platelets differential   Result Value Ref Range    WBC 6.1 4.0 - 11.0 10e9/L    RBC Count 4.88 3.8 - 5.2 10e12/L    Hemoglobin 13.7 11.7 - 15.7 g/dL    Hematocrit 42.0 35.0 - 47.0 %    MCV 86 78 - 100 fl    MCH 28.1 26.5 - 33.0 pg    MCHC 32.6 31.5 - 36.5 g/dL    RDW 13.5 10.0 - 15.0 %    Platelet Count 290 150 - 450 10e9/L    % Neutrophils 56.6 %    % Lymphocytes 29.2 %    % Monocytes 9.3 %    % Eosinophils 4.6 %    % Basophils 0.3 %    Absolute Neutrophil 3.5 1.6 - 8.3 10e9/L    Absolute Lymphocytes 1.8 0.8 - 5.3 10e9/L    Absolute Monocytes 0.6 0.0 - 1.3 10e9/L    Absolute Eosinophils 0.3 0.0 - 0.7 10e9/L    Absolute Basophils 0.0 0.0 - 0.2 10e9/L    Diff Method Automated Method    Mononucleosis screen   Result Value Ref Range    Mononucleosis Screen Negative NEG^Negative               "

## 2021-02-24 ASSESSMENT — ASTHMA QUESTIONNAIRES: ACT_TOTALSCORE: 25

## 2021-04-22 ENCOUNTER — MYC MEDICAL ADVICE (OUTPATIENT)
Dept: FAMILY MEDICINE | Facility: CLINIC | Age: 46
End: 2021-04-22

## 2021-04-26 NOTE — TELEPHONE ENCOUNTER
Please abstract the following data from this visit with this patient into the appropriate field in Epic:    Tests that can be patient reported without a hard copy:    Pap smear done on this date: 8/25/2020 (approximately), by [facility], results were -.     Silvana MARES RN

## 2021-06-28 NOTE — PROGRESS NOTES
"    Assessment & Plan     Bronchitis  Most likely viral.   Continue albuterol.   Supportive care next couple of days, if symptoms persist or worsen, fill and take antibiotic.  She has good understanding of when to take.  - benzonatate (TESSALON) 200 MG capsule; Take 1 capsule (200 mg) by mouth 3 times daily as needed  - azithromycin (ZITHROMAX) 250 MG tablet; Two tablets first day, then one tablet daily for four days.             BMI:   Estimated body mass index is 27.28 kg/m  as calculated from the following:    Height as of this encounter: 1.6 m (5' 3\").    Weight as of 2/23/21: 69.9 kg (154 lb).           Return in about 1 week (around 7/6/2021) for If symptoms persist or worsen.    Brian Carroll PA-C  [facility]   Audra is a 46 year old who presents for the following health issues     HPI     Acute Illness  Acute illness concerns: sinus infection   Onset/Duration: Wednesday evening   Symptoms:  Fever: no  Chills/Sweats: no  Headache (location?): YES  Sinus Pressure: YES  Conjunctivitis:  no  Ear Pain: no  Rhinorrhea: YES  Congestion: YES  Sore Throat: YES  Cough: YES - at first dry and now productive   Wheeze: YES- a little bit has been using albuterol inhaler every 6 hours   Decreased Appetite: no  Nausea: no  Vomiting: no  Diarrhea: no  Dysuria/Freq.: no  Dysuria or Hematuria: no  Fatigue/Achiness: YES- fatigued   Sick/Strep Exposure: no  Therapies tried and outcome: tylenol and ibuprofen       Review of Systems   Constitutional, HEENT, cardiovascular, pulmonary, gi and gu systems are negative, except as otherwise noted.      Objective    /74   Pulse 53   Temp 97.3  F (36.3  C) (Skin)   Resp 16   Ht 1.6 m (5' 3\")   SpO2 100%   BMI 27.28 kg/m    Body mass index is 27.28 kg/m .  Physical Exam   GENERAL: alert and no distress  EYES: Eyes grossly normal to inspection  RESP: rhonchi throughout and expiratory wheezes throughout  CV: regular rate and rhythm, " normal S1 S2, no S3 or S4, no murmur, click or rub, no peripheral edema and peripheral pulses strong  PSYCH: mentation appears normal, affect normal/bright

## 2021-06-29 ENCOUNTER — OFFICE VISIT (OUTPATIENT)
Dept: FAMILY MEDICINE | Facility: CLINIC | Age: 46
End: 2021-06-29
Payer: COMMERCIAL

## 2021-06-29 VITALS
HEART RATE: 53 BPM | TEMPERATURE: 97.3 F | RESPIRATION RATE: 16 BRPM | OXYGEN SATURATION: 100 % | SYSTOLIC BLOOD PRESSURE: 129 MMHG | BODY MASS INDEX: 27.28 KG/M2 | DIASTOLIC BLOOD PRESSURE: 74 MMHG | HEIGHT: 63 IN

## 2021-06-29 DIAGNOSIS — J40 BRONCHITIS: Primary | ICD-10-CM

## 2021-06-29 PROCEDURE — 99213 OFFICE O/P EST LOW 20 MIN: CPT | Performed by: PHYSICIAN ASSISTANT

## 2021-06-29 RX ORDER — AZITHROMYCIN 250 MG/1
TABLET, FILM COATED ORAL
Qty: 6 TABLET | Refills: 0 | Status: SHIPPED | OUTPATIENT
Start: 2021-06-29 | End: 2021-08-31

## 2021-06-29 RX ORDER — BENZONATATE 200 MG/1
200 CAPSULE ORAL 3 TIMES DAILY PRN
Qty: 21 CAPSULE | Refills: 0 | Status: SHIPPED | OUTPATIENT
Start: 2021-06-29 | End: 2021-07-06

## 2021-08-19 DIAGNOSIS — F41.1 GAD (GENERALIZED ANXIETY DISORDER): ICD-10-CM

## 2021-08-20 ENCOUNTER — MYC MEDICAL ADVICE (OUTPATIENT)
Dept: FAMILY MEDICINE | Facility: CLINIC | Age: 46
End: 2021-08-20

## 2021-08-24 ENCOUNTER — MYC MEDICAL ADVICE (OUTPATIENT)
Dept: FAMILY MEDICINE | Facility: CLINIC | Age: 46
End: 2021-08-24

## 2021-08-25 NOTE — TELEPHONE ENCOUNTER
Sent in one more month. Patient has appointment scheduled.    Next 5 appointments (look out 90 days)    Aug 31, 2021 10:30 AM  [facility] with Michelle Caldwell DO  [facility] ([facility] ) [address]  [facility] [address] [phone]

## 2021-08-30 NOTE — PROGRESS NOTES
SUBJECTIVE:   CC: Audra Parada is an 46 year old woman who presents for preventive health visit.       Patient has been advised of split billing requirements and indicates understanding: Yes  Healthy Habits:     Getting at least 3 servings of Calcium per day:  Yes    Bi-annual eye exam:  Yes    Dental care twice a year:  Yes    Sleep apnea or symptoms of sleep apnea:  None    Diet:  Gluten-free/reduced    Frequency of exercise:  2-3 days/week    Duration of exercise:  30-45 minutes    Taking medications regularly:  Yes    Medication side effects:  None    PHQ-2 Total Score: 0    Additional concerns today:  No          -------------------------------------    Today's PHQ-2 Score:   PHQ-2 ( 1999 [employer]) 6/29/2021   Q1: Little interest or pleasure in doing things 0   Q2: Feeling down, depressed or hopeless 0   PHQ-2 Score 0   Q1: Little interest or pleasure in doing things -   Q2: Feeling down, depressed or hopeless -   PHQ-2 Score -       Abuse: Current or Past (Physical, Sexual or Emotional) - No  Do you feel safe in your environment? Yes    Have you ever done Advance Care Planning? (For example, a Health Directive, POLST, or a discussion with a medical provider or your loved ones about your wishes):     Social History     Tobacco Use     Smoking status: Never Smoker     Smokeless tobacco: Never Used   Substance Use Topics     Alcohol use: Yes     Comment: 3-5/ week         Alcohol Use 1/23/2019   Prescreen: >3 drinks/day or >7 drinks/week? No   Prescreen: >3 drinks/day or >7 drinks/week? -       Reviewed orders with patient.  Reviewed health maintenance and updated orders accordingly - Yes  Patient Active Problem List   Diagnosis     Hypothyroidism     Contact dermatitis and other eczema, due to unspecified cause     Abnormal glandular Papanicolaou smear of cervix     Polyarteritis nodosa (H)     Bladder cancer (H)     CARDIOVASCULAR SCREENING; LDL GOAL LESS THAN 160     Recurrent pregnancy loss     DIANA  (generalized anxiety disorder)     Mild intermittent asthma with acute exacerbation     Psoriasis     Past Surgical History:   Procedure Laterality Date     ABDOMEN SURGERY       BIOPSY       C INCISE/FULGUR LESN BLADDER  8/31/07    non-invasive Gr I bladder tumor     [facility] NONSPECIFIC PROCEDURE  3/03    Colposcopy       Social History     Tobacco Use     Smoking status: Never Smoker     Smokeless tobacco: Never Used   Substance Use Topics     Alcohol use: Yes     Comment: 3-5/ week     Family History   Problem Relation Age of Onset     Diabetes Father         adult onset-using insulin     Hypertension Mother      C.A.D. Maternal Grandmother         CABG at 63     Alcohol/Drug Maternal Grandfather      C.A.D. Paternal Grandmother         MI--70's-80's     Respiratory Paternal Grandfather         asthma           Breast Cancer Screening:  Any new diagnosis of family breast, ovarian, or bowel cancer?     FHS-7: No flowsheet data found.    Mammogram Screening: Recommended annual mammography  Pertinent mammograms are reviewed under the imaging tab.    History of abnormal Pap smear: NO - age 30-65 PAP every 5 years with negative HPV co-testing recommended     Reviewed and updated as needed this visit by clinical staff                 Reviewed and updated as needed this visit by Provider                    Review of Systems   Constitutional: Negative for chills and fever.   HENT: Negative for congestion, ear pain, hearing loss and sore throat.    Eyes: Negative for pain and visual disturbance.   Respiratory: Negative for cough and shortness of breath.    Cardiovascular: Negative for chest pain, palpitations and peripheral edema.   Gastrointestinal: Negative for abdominal pain, constipation, diarrhea, heartburn, hematochezia and nausea.   Breasts:  Negative for tenderness, breast mass and discharge.   Genitourinary: Negative for dysuria, frequency, genital sores, hematuria, pelvic pain, urgency, vaginal bleeding and vaginal  discharge.   Musculoskeletal: Negative for arthralgias, joint swelling and myalgias.   Skin: Negative for rash.   Neurological: Negative for dizziness, weakness, headaches and paresthesias.   Psychiatric/Behavioral: Negative for mood changes. The patient is not nervous/anxious.           OBJECTIVE:   There were no vitals taken for this visit.  Physical Exam  GENERAL: healthy, alert and no distress  EYES: Eyes grossly normal to inspection, PERRL and conjunctivae and sclerae normal  HENT: ear canals and TM's normal,   NECK: no adenopathy, no asymmetry, masses, or scars and thyroid normal to palpation  RESP: lungs clear to auscultation - no rales, rhonchi or wheezes  CV: regular rate and rhythm, normal S1 S2, no S3 or S4, no murmur, click or rub, no peripheral edema and peripheral pulses strong  ABDOMEN: soft, nontender, no hepatosplenomegaly, no masses and bowel sounds normal  MS: no gross musculoskeletal defects noted, no edema  SKIN: no suspicious lesions or rashes  NEURO: Normal strength and tone, mentation intact and speech normal  PSYCH: mentation appears normal, affect normal/bright    Diagnostic Test Results:  Labs reviewed in Epic    ASSESSMENT/PLAN:   (Z00.00) Routine general medical examination at a health care facility  (primary encounter diagnosis)  Comment:    Plan: Comprehensive metabolic panel (BMP + Alb, Alk         Phos, ALT, AST, Total. Bili, TP), Lipid panel         reflex to direct LDL Non-fasting, Hepatitis C         Screen Reflex to HCV RNA Quant and Genotype             (M30.0) Polyarteritis nodosa (H)  Comment:    Plan: Comprehensive metabolic panel (BMP + Alb, Alk         Phos, ALT, AST, Total. Bili, TP)             (C67.9) Malignant neoplasm of urinary bladder, unspecified site (H)  Comment:  ffollows with [place]   Plan: Comprehensive metabolic panel (BMP + Alb, Alk         Phos, ALT, AST, Total. Bili, TP)             (E03.9) Hypothyroidism, unspecified type  Comment:    Plan: TSH with free T4  "reflex, Comprehensive         metabolic panel (BMP + Alb, Alk Phos, ALT, AST,        Total. Bili, TP)             (F41.1) DIANA (generalized anxiety disorder)  Comment:    Plan: sertraline (ZOLOFT) 50 MG tablet, Comprehensive        metabolic panel (BMP + Alb, Alk Phos, ALT, AST,        Total. Bili, TP)             (J45.21) Mild intermittent asthma with acute exacerbation  Comment:    Plan: Comprehensive metabolic panel (BMP + Alb, Alk         Phos, ALT, AST, Total. Bili, TP), fluticasone         (FLOVENT DISKUS) 100 MCG/BLIST inhaler             (R05) Cough  Comment:    Plan: Comprehensive metabolic panel (BMP + Alb, Alk         Phos, ALT, AST, Total. Bili, TP), albuterol         (PROAIR HFA/PROVENTIL HFA/VENTOLIN HFA) 108 (90        Base) MCG/ACT inhaler             (L40.9) Psoriasis  Comment:    Plan: Comprehensive metabolic panel (BMP + Alb, Alk         Phos, ALT, AST, Total. Bili, TP)             (Z12.11) Special screening for malignant neoplasms, colon  Comment:    Plan: Adult Gastro Ref - Procedure Only               Patient has been advised of split billing requirements and indicates understanding: Yes  COUNSELING:  Reviewed preventive health counseling, as reflected in patient instructions    Estimated body mass index is 27.28 kg/m  as calculated from the following:    Height as of 6/29/21: 1.6 m (5' 3\").    Weight as of 2/23/21: 69.9 kg (154 lb).    Weight management plan: Discussed healthy diet and exercise guidelines    She reports that she has never smoked. She has never used smokeless tobacco.      Counseling Resources:  ATP IV Guidelines  Pooled Cohorts Equation Calculator  Breast Cancer Risk Calculator  BRCA-Related Cancer Risk Assessment: FHS-7 Tool  FRAX Risk Assessment  ICSI Preventive Guidelines  Dietary Guidelines for Americans, 2010  USDA's MyPlate  ASA Prophylaxis  Lung CA Screening    Michelle Caldwell, [facility]  "

## 2021-08-31 ENCOUNTER — OFFICE VISIT (OUTPATIENT)
Dept: FAMILY MEDICINE | Facility: CLINIC | Age: 46
End: 2021-08-31
Payer: COMMERCIAL

## 2021-08-31 VITALS
WEIGHT: 168.5 LBS | BODY MASS INDEX: 29.86 KG/M2 | HEIGHT: 63 IN | TEMPERATURE: 97.7 F | OXYGEN SATURATION: 98 % | HEART RATE: 68 BPM | SYSTOLIC BLOOD PRESSURE: 109 MMHG | DIASTOLIC BLOOD PRESSURE: 71 MMHG

## 2021-08-31 DIAGNOSIS — M30.0 POLYARTERITIS NODOSA (H): ICD-10-CM

## 2021-08-31 DIAGNOSIS — C67.9 MALIGNANT NEOPLASM OF URINARY BLADDER, UNSPECIFIED SITE (H): ICD-10-CM

## 2021-08-31 DIAGNOSIS — Z12.11 SPECIAL SCREENING FOR MALIGNANT NEOPLASMS, COLON: ICD-10-CM

## 2021-08-31 DIAGNOSIS — J45.21 MILD INTERMITTENT ASTHMA WITH ACUTE EXACERBATION: ICD-10-CM

## 2021-08-31 DIAGNOSIS — L40.9 PSORIASIS: ICD-10-CM

## 2021-08-31 DIAGNOSIS — F41.1 GAD (GENERALIZED ANXIETY DISORDER): ICD-10-CM

## 2021-08-31 DIAGNOSIS — E03.9 HYPOTHYROIDISM, UNSPECIFIED TYPE: ICD-10-CM

## 2021-08-31 DIAGNOSIS — Z00.00 ROUTINE GENERAL MEDICAL EXAMINATION AT A HEALTH CARE FACILITY: Primary | ICD-10-CM

## 2021-08-31 DIAGNOSIS — R05.9 COUGH: ICD-10-CM

## 2021-08-31 PROCEDURE — 36415 COLL VENOUS BLD VENIPUNCTURE: CPT | Performed by: FAMILY MEDICINE

## 2021-08-31 PROCEDURE — 86803 HEPATITIS C AB TEST: CPT | Performed by: FAMILY MEDICINE

## 2021-08-31 PROCEDURE — 84443 ASSAY THYROID STIM HORMONE: CPT | Performed by: FAMILY MEDICINE

## 2021-08-31 PROCEDURE — 80061 LIPID PANEL: CPT | Performed by: FAMILY MEDICINE

## 2021-08-31 PROCEDURE — 99396 PREV VISIT EST AGE 40-64: CPT | Performed by: FAMILY MEDICINE

## 2021-08-31 PROCEDURE — 80053 COMPREHEN METABOLIC PANEL: CPT | Performed by: FAMILY MEDICINE

## 2021-08-31 RX ORDER — ALBUTEROL SULFATE 90 UG/1
2 AEROSOL, METERED RESPIRATORY (INHALATION) EVERY 6 HOURS PRN
Qty: 8 G | Refills: 4 | Status: SHIPPED | OUTPATIENT
Start: 2021-08-31

## 2021-08-31 ASSESSMENT — ENCOUNTER SYMPTOMS
NERVOUS/ANXIOUS: 0
MYALGIAS: 0
NAUSEA: 0
COUGH: 0
PALPITATIONS: 0
SHORTNESS OF BREATH: 0
HEMATOCHEZIA: 0
WEAKNESS: 0
HEMATURIA: 0
SORE THROAT: 0
FREQUENCY: 0
CHILLS: 0
DIARRHEA: 0
HEADACHES: 0
BREAST MASS: 0
EYE PAIN: 0
PARESTHESIAS: 0
FEVER: 0
DYSURIA: 0
ARTHRALGIAS: 0
JOINT SWELLING: 0
HEARTBURN: 0
CONSTIPATION: 0
ABDOMINAL PAIN: 0
DIZZINESS: 0

## 2021-08-31 ASSESSMENT — MIFFLIN-ST. JEOR: SCORE: 1360.26

## 2021-09-01 LAB
ALBUMIN SERPL-MCNC: 3.6 G/DL (ref 3.4–5)
ALP SERPL-CCNC: 73 U/L (ref 40–150)
ALT SERPL W P-5'-P-CCNC: 30 U/L (ref 0–50)
ANION GAP SERPL CALCULATED.3IONS-SCNC: 5 MMOL/L (ref 3–14)
AST SERPL W P-5'-P-CCNC: 23 U/L (ref 0–45)
BILIRUB SERPL-MCNC: 0.3 MG/DL (ref 0.2–1.3)
BUN SERPL-MCNC: 8 MG/DL (ref 7–30)
CALCIUM SERPL-MCNC: 9 MG/DL (ref 8.5–10.1)
CHLORIDE BLD-SCNC: 109 MMOL/L (ref 94–109)
CHOLEST SERPL-MCNC: 179 MG/DL
CO2 SERPL-SCNC: 23 MMOL/L (ref 20–32)
CREAT SERPL-MCNC: 0.81 MG/DL (ref 0.52–1.04)
FASTING STATUS PATIENT QL REPORTED: YES
GFR SERPL CREATININE-BSD FRML MDRD: 86 ML/MIN/1.73M2
GLUCOSE BLD-MCNC: 65 MG/DL (ref 70–99)
HCV AB SERPL QL IA: NONREACTIVE
HDLC SERPL-MCNC: 60 MG/DL
LDLC SERPL CALC-MCNC: 102 MG/DL
NONHDLC SERPL-MCNC: 119 MG/DL
POTASSIUM BLD-SCNC: 4.1 MMOL/L (ref 3.4–5.3)
PROT SERPL-MCNC: 8.1 G/DL (ref 6.8–8.8)
SODIUM SERPL-SCNC: 137 MMOL/L (ref 133–144)
TRIGL SERPL-MCNC: 86 MG/DL
TSH SERPL DL<=0.005 MIU/L-ACNC: 2.18 MU/L (ref 0.4–4)

## 2021-09-01 ASSESSMENT — ASTHMA QUESTIONNAIRES: ACT_TOTALSCORE: 25

## 2021-09-01 NOTE — RESULT ENCOUNTER NOTE
Dear Audra,   Your test results are all back -   -All of your labs are normal.  Let us know if you have any questions.  -Michelle Caldwell, DO

## 2021-09-11 ENCOUNTER — HEALTH MAINTENANCE LETTER (OUTPATIENT)
Age: 46
End: 2021-09-11

## 2021-10-31 ENCOUNTER — MYC MEDICAL ADVICE (OUTPATIENT)
Dept: FAMILY MEDICINE | Facility: CLINIC | Age: 46
End: 2021-10-31

## 2021-11-04 ENCOUNTER — TELEPHONE (OUTPATIENT)
Dept: FAMILY MEDICINE | Facility: CLINIC | Age: 46
End: 2021-11-04

## 2021-11-04 NOTE — TELEPHONE ENCOUNTER
DE (DOD),    Please see message below.  Please address due to PN's absence.    Do you want to do a telephone visit?    Emerita Farrell RN

## 2021-11-04 NOTE — TELEPHONE ENCOUNTER
I called and left a detailed message for her explaining that I would recommend she pursue the monoclonal antibody treatment, especially if she is symptomatic. It sounds like other people in the family have tested positive as well and she has a significant history of respiratory infections from viral illnesses. She also has a history of asthma. I explained that there is a chance she may not be eligible for the treatment when she goes tomorrow since she recently tested negative. I also spoke with our pharmacist, Tracy, about this. Thanks, DE

## 2021-11-04 NOTE — TELEPHONE ENCOUNTER
Leopoldo,   Audra called today.   Patient has questions for PN regarding monoclonal antibody therapy  Patient tested positive for covid on 10/31.   Retested on 11/2/21 and was negative.   She is scheduled for the monoclonal antibody therapy on 11/5/21 at [facility] at 2pm.   Patient wants PN's input if she should go ahead with the monoclonal antibody therapy tomorrow?    Best call back # [phone]  Ok to leave detailed vm    Thanks!  Cynthia GUDINO

## 2021-12-02 ENCOUNTER — MYC MEDICAL ADVICE (OUTPATIENT)
Dept: FAMILY MEDICINE | Facility: CLINIC | Age: 46
End: 2021-12-02
Payer: COMMERCIAL

## 2022-02-26 ENCOUNTER — HEALTH MAINTENANCE LETTER (OUTPATIENT)
Age: 47
End: 2022-02-26

## 2022-03-14 DIAGNOSIS — E03.9 HYPOTHYROIDISM, UNSPECIFIED TYPE: ICD-10-CM

## 2022-03-15 RX ORDER — LEVOTHYROXINE SODIUM 125 UG/1
TABLET ORAL
Qty: 90 TABLET | Refills: 1 | Status: SHIPPED | OUTPATIENT
Start: 2022-03-15 | End: 2022-09-23

## 2022-03-16 NOTE — PROGRESS NOTES
"  Assessment & Plan     Other fatigue  Will screen for some other possible causes including continual infection  - TSH with free T4 reflex; Future  - CBC with platelets and differential; Future  - Comprehensive metabolic panel (BMP + Alb, Alk Phos, ALT, AST, Total. Bili, TP); Future  - Vitamin B12; Future  - TSH with free T4 reflex  - CBC with platelets and differential  - Comprehensive metabolic panel (BMP + Alb, Alk Phos, ALT, AST, Total. Bili, TP)  - Vitamin B12    Post viral syndrome    - CBC with platelets and differential; Future  - CBC with platelets and differential             BMI:   Estimated body mass index is 29.59 kg/m  as calculated from the following:    Height as of 8/31/21: 1.595 m (5' 2.8\").    Weight as of this encounter: 75.3 kg (166 lb).           No follow-ups on file.    Brian Carroll PA-C  [facility]   Audra is a 46 year old who presents for the following health issues     History of Present Illness       Reason for visit:  Reoccurring Covid symptoms  Symptom onset:  More than a month  Symptoms include:  Fatigue fever chills body aches  Symptom intensity:  Moderate  Symptom progression:  Staying the same  Had these symptoms before:  Yes  Has tried/received treatment for these symptoms:  No    She eats 4 or more servings of fruits and vegetables daily.She consumes 0 sweetened beverage(s) daily.She exercises with enough effort to increase her heart rate 30 to 60 minutes per day.  She exercises with enough effort to increase her heart rate 4 days per week.   She is taking medications regularly.         Concern - Ongoing Covid Symptoms  Onset: Monday 3/14/2022  Description: Fatigue, cough, weakness  Intensity: moderate  Progression of Symptoms:  improving  Accompanying Signs & Symptoms:   Previous history of similar problem: yes  Precipitating factors:        Worsened by: n/a  Alleviating factors:        Improved by: n/a  Therapies tried and outcome: " None    Seems to be getting recurrent symptoms that tend to just last couple of days.   Ost recent was earlier this week and mostly a fatigue    Review of Systems   Constitutional, HEENT, cardiovascular, pulmonary, gi and gu systems are negative, except as otherwise noted.      Objective    /81   Pulse 56   Temp 97.9  F (36.6  C) (Oral)   Wt 75.3 kg (166 lb)   LMP 03/12/2022   SpO2 97%   Breastfeeding No   BMI 29.59 kg/m    Body mass index is 29.59 kg/m .  Physical Exam   GENERAL: alert and no distress  EYES: Eyes grossly normal to inspection  HENT: ear canals and TM's normal, nose and mouth without ulcers or lesions  RESP: lungs clear to auscultation - no rales, rhonchi or wheezes  CV: regular rate and rhythm, normal S1 S2, no S3 or S4, no murmur, click or rub, no peripheral edema and peripheral pulses strong  PSYCH: mentation appears normal, affect normal/bright

## 2022-03-17 ENCOUNTER — OFFICE VISIT (OUTPATIENT)
Dept: FAMILY MEDICINE | Facility: CLINIC | Age: 47
End: 2022-03-17
Payer: COMMERCIAL

## 2022-03-17 VITALS
SYSTOLIC BLOOD PRESSURE: 120 MMHG | WEIGHT: 166 LBS | BODY MASS INDEX: 29.59 KG/M2 | TEMPERATURE: 97.9 F | DIASTOLIC BLOOD PRESSURE: 81 MMHG | HEART RATE: 56 BPM | OXYGEN SATURATION: 97 %

## 2022-03-17 DIAGNOSIS — G93.31 POST VIRAL SYNDROME: ICD-10-CM

## 2022-03-17 DIAGNOSIS — R53.83 OTHER FATIGUE: Primary | ICD-10-CM

## 2022-03-17 LAB
BASOPHILS # BLD AUTO: 0 10E3/UL (ref 0–0.2)
BASOPHILS NFR BLD AUTO: 0 %
EOSINOPHIL # BLD AUTO: 0.2 10E3/UL (ref 0–0.7)
EOSINOPHIL NFR BLD AUTO: 5 %
ERYTHROCYTE [DISTWIDTH] IN BLOOD BY AUTOMATED COUNT: 13.7 % (ref 10–15)
HCT VFR BLD AUTO: 37.3 % (ref 35–47)
HGB BLD-MCNC: 12.4 G/DL (ref 11.7–15.7)
LYMPHOCYTES # BLD AUTO: 1.4 10E3/UL (ref 0.8–5.3)
LYMPHOCYTES NFR BLD AUTO: 35 %
MCH RBC QN AUTO: 28.1 PG (ref 26.5–33)
MCHC RBC AUTO-ENTMCNC: 33.2 G/DL (ref 31.5–36.5)
MCV RBC AUTO: 84 FL (ref 78–100)
MONOCYTES # BLD AUTO: 0.4 10E3/UL (ref 0–1.3)
MONOCYTES NFR BLD AUTO: 9 %
NEUTROPHILS # BLD AUTO: 2 10E3/UL (ref 1.6–8.3)
NEUTROPHILS NFR BLD AUTO: 51 %
PLATELET # BLD AUTO: 231 10E3/UL (ref 150–450)
RBC # BLD AUTO: 4.42 10E6/UL (ref 3.8–5.2)
VIT B12 SERPL-MCNC: 521 PG/ML (ref 193–986)
WBC # BLD AUTO: 4 10E3/UL (ref 4–11)

## 2022-03-17 PROCEDURE — 99213 OFFICE O/P EST LOW 20 MIN: CPT | Performed by: PHYSICIAN ASSISTANT

## 2022-03-17 PROCEDURE — 80050 GENERAL HEALTH PANEL: CPT | Performed by: PHYSICIAN ASSISTANT

## 2022-03-17 PROCEDURE — 36415 COLL VENOUS BLD VENIPUNCTURE: CPT | Performed by: PHYSICIAN ASSISTANT

## 2022-03-17 PROCEDURE — 82607 VITAMIN B-12: CPT | Performed by: PHYSICIAN ASSISTANT

## 2022-03-18 LAB
ALBUMIN SERPL-MCNC: 3.3 G/DL (ref 3.4–5)
ALP SERPL-CCNC: 60 U/L (ref 40–150)
ALT SERPL W P-5'-P-CCNC: 26 U/L (ref 0–50)
ANION GAP SERPL CALCULATED.3IONS-SCNC: 9 MMOL/L (ref 3–14)
AST SERPL W P-5'-P-CCNC: 21 U/L (ref 0–45)
BILIRUB SERPL-MCNC: 0.3 MG/DL (ref 0.2–1.3)
BUN SERPL-MCNC: 14 MG/DL (ref 7–30)
CALCIUM SERPL-MCNC: 9.1 MG/DL (ref 8.5–10.1)
CHLORIDE BLD-SCNC: 106 MMOL/L (ref 94–109)
CO2 SERPL-SCNC: 22 MMOL/L (ref 20–32)
CREAT SERPL-MCNC: 0.73 MG/DL (ref 0.52–1.04)
GFR SERPL CREATININE-BSD FRML MDRD: >90 ML/MIN/1.73M2
GLUCOSE BLD-MCNC: 86 MG/DL (ref 70–99)
POTASSIUM BLD-SCNC: 4.2 MMOL/L (ref 3.4–5.3)
PROT SERPL-MCNC: 8 G/DL (ref 6.8–8.8)
SODIUM SERPL-SCNC: 137 MMOL/L (ref 133–144)
TSH SERPL DL<=0.005 MIU/L-ACNC: 1.44 MU/L (ref 0.4–4)

## 2022-03-18 NOTE — RESULT ENCOUNTER NOTE
Dear Audra    Your test results are attached, feel free to contact me via [employer]    Everything looks quite stable on your labs.  This is overall a good sign, but does not necessarily tell us what is going on.  I still wonder about some post viral condition.  Most of the time the only treatment we know is time.  How are we feeling today?    Jacobo Carroll PA-C

## 2022-10-30 ENCOUNTER — HEALTH MAINTENANCE LETTER (OUTPATIENT)
Age: 47
End: 2022-10-30

## 2022-11-08 ENCOUNTER — MYC MEDICAL ADVICE (OUTPATIENT)
Dept: FAMILY MEDICINE | Facility: CLINIC | Age: 47
End: 2022-11-08

## 2022-11-08 DIAGNOSIS — Z12.11 SCREEN FOR COLON CANCER: Primary | ICD-10-CM

## 2022-11-08 NOTE — TELEPHONE ENCOUNTER
No need to check TSH  again before February since was done just in March - typically good for one year  Thanks  PN

## 2022-11-08 NOTE — TELEPHONE ENCOUNTER
PN,  Please see below [employer] message and advise.  Pended order if approved.  Thanks,  Apurva BRIGGS RN

## 2022-11-08 NOTE — TELEPHONE ENCOUNTER
PN,  Please see below [employer] message and advise.  Last TSH 1.44 on 3/17/2022.  Current levo dose 125 mcg.  Pended TSH if desired.  Thanks,  Apurva BRIGGS RN

## 2022-11-10 ENCOUNTER — TELEPHONE (OUTPATIENT)
Dept: GASTROENTEROLOGY | Facility: CLINIC | Age: 47
End: 2022-11-10

## 2022-11-10 NOTE — TELEPHONE ENCOUNTER
Screening Questions  BLUE  KIND OF PREP RED  LOCATION [review exclusion criteria] GREEN  SEDATION TYPE        Y Are you active on mychart?       Michelle Caldwell, DO in [facility] Ordering/Referring Provider?        MEDICA What type of coverage do you have?      N Have you had a positive covid test in the last 90 days?     29.4 1. BMI  [BMI 40+ - review exclusion criteria]    Y  2. Are you able to give consent for your medical care? [IF NO,RN REVIEW]        N  3. Are you taking any prescription pain medications on a routine schedule?      N  3a. EXTENDED PREP What kind of prescription?     N 4. Do you have any chemical dependencies such as alcohol, street drugs, or methadone?    N 5. Do you have any history of post-traumatic stress syndrome, severe anxiety or history of psychosis?      **If yes 3- 5 , please schedule with MAC sedation.**          IF YES TO ANY 6 - 10 - HOSPITAL SETTING ONLY.     N 6.   Do you need assistance transferring?     n 7.   Have you had a heart or lung transplant?    N 8.   Are you currently on dialysis?   N 9.   Do you use daily home oxygen?   N 10. Do you take nitroglycerin?   10a. N If yes, how often?     11. [FEMALES]  N Are you currently pregnant?    11a. N If yes, how many weeks? [ Greater than 12 weeks, OR NEEDED]    N 12. Do you have Pulmonary Hypertension? *NEED PAC APPT AT [facility]*     N 13. [review exclusion criteria]  Do you have any implantable devices in your body (pacemaker, defib, LVAD)?    N 14. In the past 6 months, have you had any heart related issues including cardiomyopathy or heart attack?     14a. N If yes, did it require cardiac stenting if so when?     N 15. Have you had a stroke or Transient ischemic attack (TIA - aka  mini stroke ) within 6 months?      N 16. Do you have mod to severe Obstructive Sleep Apnea?  [Hospital only - Ok at [facility]]    N 17. Do you have SEVERE AND UNCONTROLLED asthma? *NEED PAC APPT AT [facility]*     N 18. Are you currently taking  "any blood thinners?     18a. If yes, inform patient to \"follow up w/ ordering provider for bridging instructions.\"    N 19. Do you take the medication Phentermine?    19a. If yes, \"Hold for 7 days before procedure.  Please consult your prescribing provider if you have questions about holding this medication.\"     N  20. Do you have chronic kidney disease?      N  21. Do you have a diagnosis of diabetes?     N  22. On a regular basis do you go 3-5 days between bowel movements?      23. Preferred LOCAL Pharmacy for Pre Prescription    [ LIST ONLY ONE PHARMACY]     [id number] - [place], [place] - [address] AT [facility] [address] [facility] [address]    - CLOSING REMINDERS -    Informed patient they will need an adult    Cannot take any type of public or medical transportation alone    Conscious Sedation- Needs  for 6 hours after the procedure       MAC/General-Needs  for 24 hours after procedure    Pre-Procedure Covid test to be completed [[facility] PCR Testing Required]    Confirmed Nurse will call to complete assessment       - SCHEDULING DETAILS -     Juancarlos  Surgeon    1/18  Date of Procedure  Lower Endoscopy [Colonoscopy]  Type of Procedure Scheduled   [facility]-If you answer yes to questions #8, #20, #21Which Colonoscopy Prep was Sent?     moderate Sedation Type     n PAC / Pre-op Required       Additional comments:  none           "

## 2023-01-11 RX ORDER — BISACODYL 5 MG
TABLET, DELAYED RELEASE (ENTERIC COATED) ORAL
Qty: 4 TABLET | Refills: 0 | Status: SHIPPED | OUTPATIENT
Start: 2023-01-11 | End: 2023-02-10

## 2023-01-18 ENCOUNTER — HOSPITAL ENCOUNTER (OUTPATIENT)
Facility: CLINIC | Age: 48
Discharge: HOME | End: 2023-01-18
Attending: COLON & RECTAL SURGERY | Admitting: COLON & RECTAL SURGERY
Payer: COMMERCIAL

## 2023-01-18 VITALS
BODY MASS INDEX: 30.12 KG/M2 | DIASTOLIC BLOOD PRESSURE: 60 MMHG | WEIGHT: 170 LBS | SYSTOLIC BLOOD PRESSURE: 119 MMHG | RESPIRATION RATE: 18 BRPM | OXYGEN SATURATION: 98 % | HEART RATE: 54 BPM | HEIGHT: 63 IN

## 2023-01-18 DIAGNOSIS — Z12.11 ENCOUNTER FOR SCREENING COLONOSCOPY: Primary | ICD-10-CM

## 2023-01-18 LAB — COLONOSCOPY: NORMAL

## 2023-01-18 PROCEDURE — 45378 DIAGNOSTIC COLONOSCOPY: CPT | Performed by: COLON & RECTAL SURGERY

## 2023-01-18 PROCEDURE — G0121 COLON CA SCRN NOT HI RSK IND: HCPCS | Performed by: COLON & RECTAL SURGERY

## 2023-01-18 PROCEDURE — 250N000011 HC RX IP 250 OP 636: Performed by: COLON & RECTAL SURGERY

## 2023-01-18 PROCEDURE — G0500 MOD SEDAT ENDO SERVICE >5YRS: HCPCS | Performed by: COLON & RECTAL SURGERY

## 2023-01-18 RX ORDER — ONDANSETRON 2 MG/ML
4 INJECTION INTRAMUSCULAR; INTRAVENOUS
Status: DISCONTINUED | OUTPATIENT
Start: 2023-01-18 | End: 2023-01-18 | Stop reason: HOSPADM

## 2023-01-18 RX ORDER — LIDOCAINE 40 MG/G
CREAM TOPICAL
Status: DISCONTINUED | OUTPATIENT
Start: 2023-01-18 | End: 2023-01-18 | Stop reason: HOSPADM

## 2023-01-18 RX ORDER — FENTANYL CITRATE 50 UG/ML
INJECTION, SOLUTION INTRAMUSCULAR; INTRAVENOUS PRN
Status: DISCONTINUED | OUTPATIENT
Start: 2023-01-18 | End: 2023-01-18 | Stop reason: HOSPADM

## 2023-01-18 ASSESSMENT — ACTIVITIES OF DAILY LIVING (ADL): ADLS_ACUITY_SCORE: 35

## 2023-01-18 NOTE — H&P
Colon & Rectal Surgery History and Physical  Pre-Endoscopy Procedure Note    History of Present Illness   I have been asked by Dr. Caldwell to evaluate this 47 year old female for colorectal cancer screening. She currently denies any abdominal pain, weight loss, bleeding per rectum, or recent change in bowel habits.    Past Medical History  Diagnosis Date     Abnormal glandular Papanicolaou smear of cervix 2.0/     Bladder cancer      Depression      Mild intermittent asthma with acute exacerbation      Neoplasm of bladder 8/31/07     Other specified viral warts      Unspecified hypothyroidism 2/98     Varicella without mention of complication     childhood       Past Surgical History  Procedure Laterality Date     BIOPSY       C-sections       BLADDER SURGERY  08/31/2007    non-invasive bladder tumor     COLPOSCOPY  03/2003        Medications  Medication Sig     albuterol (PROAIR HFA/PROVENTIL HFA/VENTOLIN HFA) 108 (90 Base) MCG/ACT inhaler Inhale 2 puffs into the lungs every 6 hours as needed for shortness of breath / dyspnea or wheezing     fluticasone (FLOVENT DISKUS) 100 MCG/BLIST inhaler Inhale 1 puff into the lungs 2 times daily     levothyroxine (SYNTHROID/LEVOTHROID) 125 MCG tablet TAKE 1 TABLET(125 MCG) BY MOUTH DAILY. EXCEPT 1/2 TABLET ON SUNDAY     Multiple Vitamins-Minerals (MULTIVITAMIN ADULT PO)      Omega-3 Fatty Acids (OMEGA-3 FISH OIL PO)      Risankizumab-rzaa (SKYRIZI PEN SC)      sertraline (ZOLOFT) 50 MG tablet TAKE 1 TABLET(50 MG) BY MOUTH DAILY     vitamin D3 (CHOLECALCIFEROL) 2000 units tablet Take 1 tablet by mouth daily       Allergies  Allergen Reactions     No Known Drug Allergies         Family History   Family history includes Alcohol/Drug Use in her maternal grandfather; C.A.D. in her maternal grandmother and paternal grandmother; Chronic Obstructive Pulmonary Disease in her father; Diabetes in her father; Hypertension in her mother; Respiratory Disorder in her paternal grandfather.  "    Social History   She reports that she has never smoked. She has never used smokeless tobacco. She reports current alcohol use. She reports that she does not use drugs.    Review of Systems   Constitutional:  No fever, weight change or fatigue.    Eyes:     No dry eyes or vision changes.   Ears/Nose/Throat/Neck:  No oral ulcers, sore throat or voice change.    Cardiovascular:   No palpitations, syncope, angina or edema.   Respiratory:    No chest pain, excessive sleepiness, shortness of breath or hemoptysis.    Gastrointestinal:   No abdominal pain, nausea, vomiting, diarrhea or heartburn.    Genitourinary:   No dysuria, hematuria, urinary retention or urinary frequency.   Musculoskeletal:  No joint swelling or arthralgias.    Dermatologic:  No skin rash or other skin changes.   Neurologic:    No focal weakness or numbness. No neuropathy.   Psychiatric:    No depression, anxiety, suicidal ideation, or paranoid ideation.   Endocrine:   No cold or heat intolerance, polydipsia, hirsutism, change in libido, or flushing.   Hematology/Lymphatic:  No bleeding or lymphadenopathy.    Allergy/Immunology:  No rhinitis or hives.     Physical Exam   Vitals:  Blood pressure 122/76, resp. rate 16, height 1.6 m (5' 3\"), weight 77.1 kg (170 lb), last menstrual period 01/06/2023, not currently breastfeeding.    General:  Alert and oriented to person, place and time   Airway: Normal oropharyngeal airway and neck mobility   Lungs:  Clear bilaterally   Heart:  Regular rate and rhythm   Abdomen: Soft, NT, ND, no masses   Extremities: Warm, good capillary refill    ASA Grade: II (mild systemic disease)    Impression: Cleared for use of conscious sedation for colorectal cancer screening    Plan: Proceed with colonoscopy     Krista Bauer MD  [facility]  [phone]  "

## 2023-02-10 ENCOUNTER — OFFICE VISIT (OUTPATIENT)
Dept: FAMILY MEDICINE | Facility: CLINIC | Age: 48
End: 2023-02-10
Payer: COMMERCIAL

## 2023-02-10 VITALS
DIASTOLIC BLOOD PRESSURE: 84 MMHG | TEMPERATURE: 97.8 F | HEART RATE: 60 BPM | BODY MASS INDEX: 32.25 KG/M2 | HEIGHT: 63 IN | WEIGHT: 182 LBS | RESPIRATION RATE: 18 BRPM | SYSTOLIC BLOOD PRESSURE: 121 MMHG | OXYGEN SATURATION: 98 %

## 2023-02-10 DIAGNOSIS — M30.0 POLYARTERITIS NODOSA (H): ICD-10-CM

## 2023-02-10 DIAGNOSIS — F41.1 GAD (GENERALIZED ANXIETY DISORDER): ICD-10-CM

## 2023-02-10 DIAGNOSIS — E03.9 HYPOTHYROIDISM, UNSPECIFIED TYPE: ICD-10-CM

## 2023-02-10 DIAGNOSIS — C67.9 MALIGNANT NEOPLASM OF URINARY BLADDER, UNSPECIFIED SITE (H): ICD-10-CM

## 2023-02-10 DIAGNOSIS — N95.1 PERIMENOPAUSAL: ICD-10-CM

## 2023-02-10 DIAGNOSIS — Z12.31 VISIT FOR SCREENING MAMMOGRAM: ICD-10-CM

## 2023-02-10 DIAGNOSIS — Z00.00 ROUTINE GENERAL MEDICAL EXAMINATION AT A HEALTH CARE FACILITY: Primary | ICD-10-CM

## 2023-02-10 LAB
ALBUMIN SERPL BCG-MCNC: 4.1 G/DL (ref 3.5–5.2)
ALP SERPL-CCNC: 72 U/L (ref 35–104)
ALT SERPL W P-5'-P-CCNC: 22 U/L (ref 10–35)
ANION GAP SERPL CALCULATED.3IONS-SCNC: 13 MMOL/L (ref 7–15)
AST SERPL W P-5'-P-CCNC: 25 U/L (ref 10–35)
BILIRUB SERPL-MCNC: 0.3 MG/DL
BUN SERPL-MCNC: 14.6 MG/DL (ref 6–20)
CALCIUM SERPL-MCNC: 9.3 MG/DL (ref 8.6–10)
CHLORIDE SERPL-SCNC: 103 MMOL/L (ref 98–107)
CHOLEST SERPL-MCNC: 180 MG/DL
CREAT SERPL-MCNC: 0.84 MG/DL (ref 0.51–0.95)
DEPRECATED HCO3 PLAS-SCNC: 21 MMOL/L (ref 22–29)
GFR SERPL CREATININE-BSD FRML MDRD: 85 ML/MIN/1.73M2
GLUCOSE SERPL-MCNC: 87 MG/DL (ref 70–99)
HDLC SERPL-MCNC: 62 MG/DL
LDLC SERPL CALC-MCNC: 106 MG/DL
NONHDLC SERPL-MCNC: 118 MG/DL
POTASSIUM SERPL-SCNC: 4.1 MMOL/L (ref 3.4–5.3)
PROT SERPL-MCNC: 7.7 G/DL (ref 6.4–8.3)
SODIUM SERPL-SCNC: 137 MMOL/L (ref 136–145)
T4 FREE SERPL-MCNC: 1.27 NG/DL (ref 0.9–1.7)
TRIGL SERPL-MCNC: 61 MG/DL
TSH SERPL DL<=0.005 MIU/L-ACNC: 5.05 UIU/ML (ref 0.3–4.2)

## 2023-02-10 PROCEDURE — 99396 PREV VISIT EST AGE 40-64: CPT | Mod: 25 | Performed by: FAMILY MEDICINE

## 2023-02-10 PROCEDURE — 90471 IMMUNIZATION ADMIN: CPT | Performed by: FAMILY MEDICINE

## 2023-02-10 PROCEDURE — 99213 OFFICE O/P EST LOW 20 MIN: CPT | Mod: 25 | Performed by: FAMILY MEDICINE

## 2023-02-10 PROCEDURE — 80061 LIPID PANEL: CPT | Performed by: FAMILY MEDICINE

## 2023-02-10 PROCEDURE — 90715 TDAP VACCINE 7 YRS/> IM: CPT | Performed by: FAMILY MEDICINE

## 2023-02-10 PROCEDURE — 80053 COMPREHEN METABOLIC PANEL: CPT | Performed by: FAMILY MEDICINE

## 2023-02-10 PROCEDURE — 84443 ASSAY THYROID STIM HORMONE: CPT | Performed by: FAMILY MEDICINE

## 2023-02-10 PROCEDURE — 36415 COLL VENOUS BLD VENIPUNCTURE: CPT | Performed by: FAMILY MEDICINE

## 2023-02-10 PROCEDURE — 84439 ASSAY OF FREE THYROXINE: CPT | Performed by: FAMILY MEDICINE

## 2023-02-10 RX ORDER — KETOCONAZOLE 20 MG/G
CREAM TOPICAL
COMMUNITY
Start: 2022-04-25 | End: 2023-11-01

## 2023-02-10 RX ORDER — LEVONORGESTREL/ETHIN.ESTRADIOL 0.1-0.02MG
1 TABLET ORAL DAILY
Qty: 84 TABLET | Refills: 4 | Status: SHIPPED | OUTPATIENT
Start: 2023-02-10 | End: 2023-05-19

## 2023-02-10 RX ORDER — CLOBETASOL PROPIONATE 0.5 MG/G
OINTMENT TOPICAL
COMMUNITY
Start: 2022-08-17 | End: 2023-11-01

## 2023-02-10 ASSESSMENT — ANXIETY QUESTIONNAIRES
1. FEELING NERVOUS, ANXIOUS, OR ON EDGE: SEVERAL DAYS
2. NOT BEING ABLE TO STOP OR CONTROL WORRYING: SEVERAL DAYS
5. BEING SO RESTLESS THAT IT IS HARD TO SIT STILL: NOT AT ALL
GAD7 TOTAL SCORE: 5
7. FEELING AFRAID AS IF SOMETHING AWFUL MIGHT HAPPEN: SEVERAL DAYS
7. FEELING AFRAID AS IF SOMETHING AWFUL MIGHT HAPPEN: SEVERAL DAYS
3. WORRYING TOO MUCH ABOUT DIFFERENT THINGS: SEVERAL DAYS
GAD7 TOTAL SCORE: 5
GAD7 TOTAL SCORE: 5
4. TROUBLE RELAXING: NOT AT ALL
8. IF YOU CHECKED OFF ANY PROBLEMS, HOW DIFFICULT HAVE THESE MADE IT FOR YOU TO DO YOUR WORK, TAKE CARE OF THINGS AT HOME, OR GET ALONG WITH OTHER PEOPLE?: SOMEWHAT DIFFICULT
IF YOU CHECKED OFF ANY PROBLEMS ON THIS QUESTIONNAIRE, HOW DIFFICULT HAVE THESE PROBLEMS MADE IT FOR YOU TO DO YOUR WORK, TAKE CARE OF THINGS AT HOME, OR GET ALONG WITH OTHER PEOPLE: SOMEWHAT DIFFICULT
6. BECOMING EASILY ANNOYED OR IRRITABLE: SEVERAL DAYS

## 2023-02-10 ASSESSMENT — ENCOUNTER SYMPTOMS
NERVOUS/ANXIOUS: 0
FEVER: 0
HEMATOCHEZIA: 0
DIARRHEA: 0
PARESTHESIAS: 0
HEARTBURN: 0
SHORTNESS OF BREATH: 0
HEMATURIA: 0
SORE THROAT: 0
HEADACHES: 0
DIZZINESS: 0
BREAST MASS: 0
FREQUENCY: 0
COUGH: 0
CONSTIPATION: 0
JOINT SWELLING: 0
ABDOMINAL PAIN: 0
EYE PAIN: 0
NAUSEA: 0
PALPITATIONS: 0
CHILLS: 0
ARTHRALGIAS: 1
WEAKNESS: 0
DYSURIA: 0
MYALGIAS: 0

## 2023-02-10 ASSESSMENT — ASTHMA QUESTIONNAIRES

## 2023-02-10 ASSESSMENT — PATIENT HEALTH QUESTIONNAIRE - PHQ9
10. IF YOU CHECKED OFF ANY PROBLEMS, HOW DIFFICULT HAVE THESE PROBLEMS MADE IT FOR YOU TO DO YOUR WORK, TAKE CARE OF THINGS AT HOME, OR GET ALONG WITH OTHER PEOPLE: SOMEWHAT DIFFICULT
SUM OF ALL RESPONSES TO PHQ QUESTIONS 1-9: 9
SUM OF ALL RESPONSES TO PHQ QUESTIONS 1-9: 9

## 2023-02-10 ASSESSMENT — PAIN SCALES - GENERAL: PAINLEVEL: MILD PAIN (2)

## 2023-02-10 NOTE — PROGRESS NOTES
SUBJECTIVE:   CC: Audra is an 47 year old who presents for preventive health visit.      Healthy Habits:     Getting at least 3 servings of Calcium per day:  Yes    Bi-annual eye exam:  Yes    Dental care twice a year:  Yes    Sleep apnea or symptoms of sleep apnea:  Excessive snoring    Diet:  Gluten-free/reduced and Other    Frequency of exercise:  4-5 days/week    Duration of exercise:  30-45 minutes    Taking medications regularly:  Yes    Medication side effects:  None    PHQ-2 Total Score: 2    Additional concerns today:  Yes  Answers for HPI/ROS submitted by the patient on 2/10/2023  If you checked off any problems, how difficult have these problems made it for you to do your work, take care of things at home, or get along with other people?: Somewhat difficult  PHQ9 TOTAL SCORE: 9  DIANA 7 TOTAL SCORE: 5            PROBLEMS TO ADD ON...  -------------------------------------    Thyroid - on med but wonders if off or menopause    Menopause - pt admits to 10-12 pound weight gain over the past year despite eating healthy and exercising -   Pt menses more irregular - vary a month up to 2 months - bleeding also varies          Today's PHQ-2 Score:   PHQ-2 ( 1999 [employer]) 2/10/2023   Q1: Little interest or pleasure in doing things 1   Q2: Feeling down, depressed or hopeless 1   PHQ-2 Score 2   PHQ-2 Total Score (12-17 Years)- Positive if 3 or more points; Administer PHQ-A if positive -   Q1: Little interest or pleasure in doing things Several days   Q2: Feeling down, depressed or hopeless Several days   PHQ-2 Score 2       Have you ever done Advance Care Planning? (For example, a Health Directive, POLST, or a discussion with a medical provider or your loved ones about your wishes):     Social History     Tobacco Use     Smoking status: Never     Smokeless tobacco: Never   Substance Use Topics     Alcohol use: Yes     Comment: 3-5/ week     If you drink alcohol do you typically have >3 drinks per day or >7 drinks  per week? No    Alcohol Use 2/10/2023   Prescreen: >3 drinks/day or >7 drinks/week? No   Prescreen: >3 drinks/day or >7 drinks/week? -   No flowsheet data found.    Reviewed orders with patient.  Reviewed health maintenance and updated orders accordingly - Yes  Patient Active Problem List   Diagnosis     Hypothyroidism     Contact dermatitis and other eczema, due to unspecified cause     Abnormal glandular Papanicolaou smear of cervix     Polyarteritis nodosa (H)     Bladder cancer (H)     CARDIOVASCULAR SCREENING; LDL GOAL LESS THAN 160     Recurrent pregnancy loss     DIANA (generalized anxiety disorder)     Mild intermittent asthma with acute exacerbation     Psoriasis     Past Surgical History:   Procedure Laterality Date     ABDOMEN SURGERY       BIOPSY       c sections       COLONOSCOPY N/A 1/18/2023    Procedure: COLONOSCOPY;  Surgeon: Krista Bauer MD;  Location:  GI     [facility] INCISE/FULGUR LESN BLADDER  08/31/2007    non-invasive Gr I bladder tumor     [facility] NONSPECIFIC PROCEDURE  03/2003    Colposcopy       Social History     Tobacco Use     Smoking status: Never     Smokeless tobacco: Never   Substance Use Topics     Alcohol use: Yes     Comment: 3-5/ week     Family History   Problem Relation Age of Onset     Hypertension Mother      Diabetes Father         adult onset-using insulin     Chronic Obstructive Pulmonary Disease Father      C.A.D. Maternal Grandmother         CABG at 63     Alcohol/Drug Maternal Grandfather      C.A.D. Paternal Grandmother         MI--70's-80's     Respiratory Paternal Grandfather         asthma           Breast Cancer Screening:    Breast CA Risk Assessment (FHS-7) 8/31/2021   Do you have a family history of breast, colon, or ovarian cancer? No / Unknown         Mammogram Screening: Recommended annual mammography  Pertinent mammograms are reviewed under the imaging tab.    History of abnormal Pap smear: NO - age 30- 65 PAP every 3 years recommended     Reviewed and  "updated as needed this visit by clinical staff   Tobacco  Allergies  Meds  Problems  Med Hx  Surg Hx  Fam Hx          Reviewed and updated as needed this visit by Provider   Tobacco  Allergies  Meds  Problems  Med Hx  Surg Hx  Fam Hx             Review of Systems   Constitutional: Negative for chills and fever.   HENT: Negative for congestion, ear pain, hearing loss and sore throat.    Eyes: Negative for pain and visual disturbance.   Respiratory: Negative for cough and shortness of breath.    Cardiovascular: Negative for chest pain, palpitations and peripheral edema.   Gastrointestinal: Negative for abdominal pain, constipation, diarrhea, heartburn, hematochezia and nausea.   Breasts:  Negative for tenderness, breast mass and discharge.   Genitourinary: Positive for vaginal discharge. Negative for dysuria, frequency, genital sores, hematuria, pelvic pain, urgency and vaginal bleeding.   Musculoskeletal: Positive for arthralgias. Negative for joint swelling and myalgias.   Skin: Negative for rash.   Neurological: Negative for dizziness, weakness, headaches and paresthesias.   Psychiatric/Behavioral: Positive for mood changes. The patient is not nervous/anxious.         OBJECTIVE:   /84   Pulse 60   Temp 97.8  F (36.6  C) (Temporal)   Resp 18   Ht 1.593 m (5' 2.72\")   Wt 82.6 kg (182 lb)   LMP 02/09/2023   SpO2 98%   BMI 32.53 kg/m    Physical Exam  GENERAL: healthy, alert and no distress  EYES: Eyes grossly normal to inspection, PERRL and conjunctivae and sclerae normal  HENT: ear canals and TM's normal, nose and mouth without ulcers or lesions  NECK: no adenopathy, no asymmetry, masses, or scars and thyroid normal to palpation  RESP: lungs clear to auscultation - no rales, rhonchi or wheezes  BREAST: normal without masses, tenderness or nipple discharge and no palpable axillary masses or adenopathy  CV: regular rate and rhythm, normal S1 S2, no S3 or S4, no murmur, click or rub, no " peripheral edema and peripheral pulses strong  ABDOMEN: soft, nontender, no hepatosplenomegaly, no masses and bowel sounds normal  MS: no gross musculoskeletal defects noted, no edema  SKIN: no suspicious lesions or rashes  NEURO: Normal strength and tone, mentation intact and speech normal  PSYCH: mentation appears normal, affect normal/bright    Diagnostic Test Results:  Labs reviewed in Epic    ASSESSMENT/PLAN:   (Z00.00) Routine general medical examination at a health care facility  (primary encounter diagnosis)  Comment: routine screening   Plan: Comprehensive metabolic panel (BMP + Alb, Alk         Phos, ALT, AST, Total. Bili, TP), Lipid panel         reflex to direct LDL Fasting             (Z12.31) Visit for screening mammogram  Comment:  Patient has mammogram with OB/gyn group-   Is UTD - she will try to get us a copy of record  Plan:      (F41.1) DIANA (generalized anxiety disorder)  Comment: refilled   Unclear if contributing to weight gain?  Will continue for now but this summer consider switch?  Plan: sertraline (ZOLOFT) 50 MG tablet             (E03.9) Hypothyroidism, unspecified type  Comment: TSH pending -   Will await the results and adjust if needed   Plan: TSH with free T4 reflex             (C67.9) Malignant neoplasm of urinary bladder, unspecified site (H)  Comment: seeing [facility] next week   Plan:      (M30.0) Polyarteritis nodosa (H)  Comment:   Plan:      (N95.1) Perimenopausal  Comment:  Perimenopausal symptoms but still having menses  Will Rx low dose OCP to see if helpful for symptoms  Pt will call or RTC if symptoms worsen or do not improve.   Plan: levonorgestrel-ethinyl estradiol (AVIANE)         0.1-20 MG-MCG tablet         reviewe risk vs benefits and potential side effects       Patient has been advised of split billing requirements and indicates understanding: Yes      COUNSELING:  Reviewed preventive health counseling, as reflected in patient instructions      BMI:   Estimated body mass  "index is 32.53 kg/m  as calculated from the following:    Height as of this encounter: 1.593 m (5' 2.72\").    Weight as of this encounter: 82.6 kg (182 lb).   Weight management plan: Discussed healthy diet and exercise guidelines      She reports that she has never smoked. She has never used smokeless tobacco.       Michelle Caldwell [facility] [place]  "

## 2023-02-10 NOTE — NURSING NOTE
Prior to immunization administration, verified patients identity using patient s name and date of birth. Please see Immunization Activity for additional information.     Screening Questionnaire for Adult Immunization    Are you sick today?   No   Do you have allergies to medications, food, a vaccine component or latex?   No   Have you ever had a serious reaction after receiving a vaccination?   No   Do you have a long-term health problem with heart, lung, kidney, or metabolic disease (e.g., diabetes), asthma, a blood disorder, no spleen, complement component deficiency, a cochlear implant, or a spinal fluid leak?  Are you on long-term aspirin therapy?   No   Do you have cancer, leukemia, HIV/AIDS, or any other immune system problem?   No   Do you have a parent, brother, or sister with an immune system problem?   No   In the past 3 months, have you taken medications that affect  your immune system, such as prednisone, other steroids, or anticancer drugs; drugs for the treatment of rheumatoid arthritis, Crohn s disease, or psoriasis; or have you had radiation treatments?   No   Have you had a seizure, or a brain or other nervous system problem?   No   During the past year, have you received a transfusion of blood or blood    products, or been given immune (gamma) globulin or antiviral drug?   No   For women: Are you pregnant or is there a chance you could become       pregnant during the next month?   No   Have you received any vaccinations in the past 4 weeks?   No     Immunization questionnaire answers were all negative.        Per orders of Dr. Caldwell, injection of tdap (adacel) given by Bridgett Minaya. Patient instructed to remain in clinic for 15 minutes afterwards, and to report any adverse reaction to me immediately.       Screening performed by Bridgett Minaya on 2/10/2023 at 11:41 AM.

## 2023-02-13 ENCOUNTER — MYC MEDICAL ADVICE (OUTPATIENT)
Dept: FAMILY MEDICINE | Facility: CLINIC | Age: 48
End: 2023-02-13
Payer: COMMERCIAL

## 2023-02-14 RX ORDER — LEVOTHYROXINE SODIUM 125 UG/1
125 TABLET ORAL DAILY
Qty: 90 TABLET | Refills: 0 | Status: SHIPPED | OUTPATIENT
Start: 2023-02-14 | End: 2023-06-30

## 2023-02-14 NOTE — RESULT ENCOUNTER NOTE
Deapam Zhu,   Your test results are all back -   -Cholesterol levels (LDL,HDL, Triglycerides) are normal.  ADVISE: rechecking in 1 year.   -Liver and gallbladder tests are normal (ALT,AST, Alk phos, bilirubin), kidney function is normal (Cr, GFR), sodium is normal, potassium is normal, calcium is normal, glucose is normal.  -TSH (thyroid stimulating hormone) is abnormal suggesting you are currently underreplaced.  ADVISE: changing your medication dose to 125 micrograms/day- ALL days of the week and rechecking your TSH with a lab appointment in 6-8 weeks.  Let us know if you have any questions.  -Michelle Caldwell, DO

## 2023-04-08 ENCOUNTER — HEALTH MAINTENANCE LETTER (OUTPATIENT)
Age: 48
End: 2023-04-08

## 2023-04-14 ENCOUNTER — LAB (OUTPATIENT)
Dept: LAB | Facility: CLINIC | Age: 48
End: 2023-04-14
Payer: COMMERCIAL

## 2023-04-14 DIAGNOSIS — E03.9 HYPOTHYROIDISM, UNSPECIFIED TYPE: ICD-10-CM

## 2023-04-14 LAB — TSH SERPL DL<=0.005 MIU/L-ACNC: 1.92 MU/L (ref 0.4–4)

## 2023-04-14 PROCEDURE — 84443 ASSAY THYROID STIM HORMONE: CPT

## 2023-04-14 PROCEDURE — 36415 COLL VENOUS BLD VENIPUNCTURE: CPT

## 2023-04-18 NOTE — RESULT ENCOUNTER NOTE
Dear Audra,   Your test results are all back -   -TSH (thyroid stimulating hormone) level is normal which indicates appropriate thyroid replacement dosing.  ADVISE: continuing same replacement dose and rechecking this in 12 months.  Let us know if you have any questions.  -Michelle Caldwell, DO

## 2023-05-19 ENCOUNTER — OFFICE VISIT (OUTPATIENT)
Dept: FAMILY MEDICINE | Facility: CLINIC | Age: 48
End: 2023-05-19
Payer: COMMERCIAL

## 2023-05-19 VITALS
BODY MASS INDEX: 32.54 KG/M2 | WEIGHT: 190.6 LBS | RESPIRATION RATE: 13 BRPM | OXYGEN SATURATION: 100 % | HEART RATE: 68 BPM | SYSTOLIC BLOOD PRESSURE: 135 MMHG | HEIGHT: 64 IN | DIASTOLIC BLOOD PRESSURE: 78 MMHG | TEMPERATURE: 97.8 F

## 2023-05-19 DIAGNOSIS — M79.89 LEG SWELLING: ICD-10-CM

## 2023-05-19 DIAGNOSIS — M77.12 LEFT TENNIS ELBOW: ICD-10-CM

## 2023-05-19 DIAGNOSIS — M25.50 MULTIPLE JOINT PAIN: Primary | ICD-10-CM

## 2023-05-19 LAB
ALBUMIN SERPL BCG-MCNC: 4.4 G/DL (ref 3.5–5.2)
ALP SERPL-CCNC: 77 U/L (ref 35–104)
ALT SERPL W P-5'-P-CCNC: 31 U/L (ref 10–35)
ANION GAP SERPL CALCULATED.3IONS-SCNC: 11 MMOL/L (ref 7–15)
AST SERPL W P-5'-P-CCNC: 29 U/L (ref 10–35)
BASOPHILS # BLD AUTO: 0 10E3/UL (ref 0–0.2)
BASOPHILS NFR BLD AUTO: 0 %
BILIRUB SERPL-MCNC: 0.3 MG/DL
BUN SERPL-MCNC: 14.2 MG/DL (ref 6–20)
CALCIUM SERPL-MCNC: 9.3 MG/DL (ref 8.6–10)
CHLORIDE SERPL-SCNC: 105 MMOL/L (ref 98–107)
CREAT SERPL-MCNC: 0.76 MG/DL (ref 0.51–0.95)
CRP SERPL-MCNC: <3 MG/L
D DIMER PPP FEU-MCNC: 0.49 UG/ML FEU (ref 0–0.5)
DEPRECATED HCO3 PLAS-SCNC: 24 MMOL/L (ref 22–29)
EOSINOPHIL # BLD AUTO: 0.2 10E3/UL (ref 0–0.7)
EOSINOPHIL NFR BLD AUTO: 3 %
ERYTHROCYTE [DISTWIDTH] IN BLOOD BY AUTOMATED COUNT: 14.4 % (ref 10–15)
ERYTHROCYTE [SEDIMENTATION RATE] IN BLOOD BY WESTERGREN METHOD: 29 MM/HR (ref 0–30)
GFR SERPL CREATININE-BSD FRML MDRD: >90 ML/MIN/1.73M2
GLUCOSE SERPL-MCNC: 98 MG/DL (ref 70–99)
HBA1C MFR BLD: 5.4 % (ref 0–5.6)
HCT VFR BLD AUTO: 38.3 % (ref 35–47)
HGB BLD-MCNC: 12.3 G/DL (ref 11.7–15.7)
IMM GRANULOCYTES # BLD: 0 10E3/UL
IMM GRANULOCYTES NFR BLD: 0 %
LYMPHOCYTES # BLD AUTO: 1.6 10E3/UL (ref 0.8–5.3)
LYMPHOCYTES NFR BLD AUTO: 22 %
MCH RBC QN AUTO: 28.1 PG (ref 26.5–33)
MCHC RBC AUTO-ENTMCNC: 32.1 G/DL (ref 31.5–36.5)
MCV RBC AUTO: 87 FL (ref 78–100)
MONOCYTES # BLD AUTO: 0.5 10E3/UL (ref 0–1.3)
MONOCYTES NFR BLD AUTO: 7 %
NEUTROPHILS # BLD AUTO: 5.1 10E3/UL (ref 1.6–8.3)
NEUTROPHILS NFR BLD AUTO: 68 %
PLATELET # BLD AUTO: 266 10E3/UL (ref 150–450)
POTASSIUM SERPL-SCNC: 4.5 MMOL/L (ref 3.4–5.3)
PROT SERPL-MCNC: 7.8 G/DL (ref 6.4–8.3)
RBC # BLD AUTO: 4.38 10E6/UL (ref 3.8–5.2)
SODIUM SERPL-SCNC: 140 MMOL/L (ref 136–145)
TSH SERPL DL<=0.005 MIU/L-ACNC: 3.24 UIU/ML (ref 0.3–4.2)
WBC # BLD AUTO: 7.4 10E3/UL (ref 4–11)

## 2023-05-19 PROCEDURE — 85379 FIBRIN DEGRADATION QUANT: CPT | Performed by: PHYSICIAN ASSISTANT

## 2023-05-19 PROCEDURE — 86039 ANTINUCLEAR ANTIBODIES (ANA): CPT | Performed by: PHYSICIAN ASSISTANT

## 2023-05-19 PROCEDURE — 85652 RBC SED RATE AUTOMATED: CPT | Performed by: PHYSICIAN ASSISTANT

## 2023-05-19 PROCEDURE — 86140 C-REACTIVE PROTEIN: CPT | Performed by: PHYSICIAN ASSISTANT

## 2023-05-19 PROCEDURE — 86038 ANTINUCLEAR ANTIBODIES: CPT | Performed by: PHYSICIAN ASSISTANT

## 2023-05-19 PROCEDURE — 83036 HEMOGLOBIN GLYCOSYLATED A1C: CPT | Performed by: PHYSICIAN ASSISTANT

## 2023-05-19 PROCEDURE — 99214 OFFICE O/P EST MOD 30 MIN: CPT | Performed by: PHYSICIAN ASSISTANT

## 2023-05-19 PROCEDURE — 86200 CCP ANTIBODY: CPT | Performed by: PHYSICIAN ASSISTANT

## 2023-05-19 PROCEDURE — 36415 COLL VENOUS BLD VENIPUNCTURE: CPT | Performed by: PHYSICIAN ASSISTANT

## 2023-05-19 PROCEDURE — 80050 GENERAL HEALTH PANEL: CPT | Performed by: PHYSICIAN ASSISTANT

## 2023-05-19 ASSESSMENT — PAIN SCALES - GENERAL: PAINLEVEL: SEVERE PAIN (6)

## 2023-05-19 NOTE — PROGRESS NOTES
Assessment & Plan     Multiple joint pain  History of vasculitis.  Inflammatory markers normal - rule out diabetes, rheumatoid arthritis or other rheumatology etiology  History of hypothyroidism- euthyroid at current dose    - ESR: Erythrocyte sedimentation rate  - CRP, inflammation  - Anti Nuclear Padma IgG by IFA with Reflex  - TSH with free T4 reflex  - CBC with platelets and differential  - Comprehensive metabolic panel (BMP + Alb, Alk Phos, ALT, AST, Total. Bili, TP)  - Hemoglobin A1c  - Cyclic Citrullinated Peptide Antibody IgG  - ESR: Erythrocyte sedimentation rate  - CRP, inflammation  - Anti Nuclear Padma IgG by IFA with Reflex  - TSH with free T4 reflex  - CBC with platelets and differential  - Comprehensive metabolic panel (BMP + Alb, Alk Phos, ALT, AST, Total. Bili, TP)  - Hemoglobin A1c  - Cyclic Citrullinated Peptide Antibody IgG    Left tennis elbow  Xray not available at [facility] - referred to orth- no improvement with tennis elbow band. Consider steroid injection  - Orthopedic  Referral    Leg swelling  Negative ddimer- swelling not suggestive of DVT   - D dimer, quantitative  - D dimer, quantitative      Review of the result(s) of each unique test - ddimer, cbc, tsh, crp, sed rate, tsh  Ordering of each unique test         Patient Instructions   I strongly encouraged you to consider the vaccine to prevent shingles.  (SHINGRIX)  This is two vaccines 2-6 months apart.   Check with your insurance company regarding coverage.  If you are on medicare you should get this vaccine at the pharmacy.       I will notify you of lab results via [employer]   Return urgently if any change in symptoms.    Follow up with rheumatology as scheduled.       Jenny Soliman PA-C  [facility]    Lio Zhu is a 48 year old, presenting for the following health issues:  Derm Problem         View : No data to display.              History of Present Illness       Reason for visit:   Joint pain edema and rash    She eats 2-3 servings of fruits and vegetables daily.She consumes 1 sweetened beverage(s) daily.She exercises with enough effort to increase her heart rate 20 to 29 minutes per day.  She exercises with enough effort to increase her heart rate 4 days per week.   She is taking medications regularly.       Concern - Joint pain  Onset:  A month ago and has been spreading  Description: joint pain in L elbow, swelling in legs and ankles. Dull and achy feeling.   Intensity: moderate   Progression of Symptoms:  worsening  Accompanying Signs & Symptoms: From eelbow the pain radiates down forearm.  Previous history of similar problem:  History of Vasculitis   Precipitating factors:        Worsened by:  Swelling is worse while standing and in heat.  Alleviating factors:        Improved by:  Resting  Therapies tried and outcome:   Icing and Ibuprofen       Patient unknown to me presents for multiple joint pain and concern for repeat of  vasculitis.  Had Vasculitis 2007- rash under skin. Complains of swelling of ankles and neuropathy.  Took a while to get diagnosed with vasculitis in past .  Did see rheumatology in past and was treated with a lot of medications- but not presently   Feels like something off   Diagnosed self with Tennis elbow left elbow- joint pain approximately a  month ago.   Am seeing a chiro and accupuncturist and not helpful for joint pain  In [place]  last week- 3rd day started to have pretty extreme swelling in ankles later in trip   Mild swelling now  Rash appeared right front tibia   Has improved but not resolved completely  Home since Wednesday ( 2 days) achy, fatigued, will take a 2 hour nap and still go to bed at 9 pm.  A little numbness in feet. neuropthy never completely resolved   Has seen rheumatology in  as well as [place] in past   History of Bladder cancer   perimenopausal just turned [age] -had a period 3 months ago   Gained quite a bit of weight last 6 months  "  Started accupuncture for hot flashes and helpful  Elbow pain is  worst (nondominant arm)- tried tennis elbow band without improvement- pain also in hands, ankles, and feet feel achy  Works in [employer] - was at a conference in [place] with a lot standing and walking   Usually works from home   Appointment with rheumatology - august   Pap smear - unsure - right at 1 yrs-2020 - likely due   Viral induced asthma-not had that for a long time     Review of Systems   Constitutional, HEENT, cardiovascular, pulmonary, gi and gu systems are negative, except as otherwise noted.      Objective    /78 (BP Location: Right arm, Patient Position: Sitting, Cuff Size: Adult Regular)   Pulse 68   Temp 97.8  F (36.6  C) (Oral)   Resp 13   Ht 1.625 m (5' 3.98\")   Wt 86.5 kg (190 lb 9.6 oz)   SpO2 100%   BMI 32.74 kg/m    Body mass index is 32.74 kg/m .  Physical Exam   GENERAL: alert, no distress and obese  NECK: no adenopathy, no asymmetry, masses, or scars and thyroid normal to palpation  RESP: lungs clear to auscultation - no rales, rhonchi or wheezes  CV: regular rate and rhythm, normal S1 S2, no S3 or S4, no murmur, click or rub, no peripheral edema and peripheral pulses strong  MS: tender lateral epicondyle of left arm- normal range of motion but pain with range of motion, no erythema or edema   Bilateral ankles minimal edema. No edema of calf. Normal gait .  Hands without erythema or edema of joints.  Normal hand    NEURO: Normal strength and tone, mentation intact and speech normal  PSYCH: mentation appears normal, affect normal/bright, judgement and insight intact and appearance well groomed  Right pretibia with macular erythematous rash without plaque or scale   Approximately 5 centimeter diameter- no ulcer   Results for orders placed or performed in visit on 05/19/23   ESR: Erythrocyte sedimentation rate     Status: Normal   Result Value Ref Range    Erythrocyte Sedimentation Rate 29 0 - 30 mm/hr   CRP, " inflammation     Status: Normal   Result Value Ref Range    CRP Inflammation <3.00 <5.00 mg/L   TSH with free T4 reflex     Status: Normal   Result Value Ref Range    TSH 3.24 0.30 - 4.20 uIU/mL   Comprehensive metabolic panel (BMP + Alb, Alk Phos, ALT, AST, Total. Bili, TP)     Status: Normal   Result Value Ref Range    Sodium 140 136 - 145 mmol/L    Potassium 4.5 3.4 - 5.3 mmol/L    Chloride 105 98 - 107 mmol/L    Carbon Dioxide (CO2) 24 22 - 29 mmol/L    Anion Gap 11 7 - 15 mmol/L    Urea Nitrogen 14.2 6.0 - 20.0 mg/dL    Creatinine 0.76 0.51 - 0.95 mg/dL    Calcium 9.3 8.6 - 10.0 mg/dL    Glucose 98 70 - 99 mg/dL    Alkaline Phosphatase 77 35 - 104 U/L    AST 29 10 - 35 U/L    ALT 31 10 - 35 U/L    Protein Total 7.8 6.4 - 8.3 g/dL    Albumin 4.4 3.5 - 5.2 g/dL    Bilirubin Total 0.3 <=1.2 mg/dL    GFR Estimate >90 >60 mL/min/1.73m2   Hemoglobin A1c     Status: Normal   Result Value Ref Range    Hemoglobin A1C 5.4 0.0 - 5.6 %   D dimer, quantitative     Status: Normal   Result Value Ref Range    D-Dimer Quantitative 0.49 0.00 - 0.50 ug/mL FEU    Narrative    This D-dimer assay is intended for use in conjunction with a clinical pretest probability assessment model to exclude pulmonary embolism (PE) and deep venous thrombosis (DVT) in outpatients suspected of PE or DVT. The cut-off value is 0.50 ug/mL FEU.   CBC with platelets and differential     Status: None   Result Value Ref Range    WBC Count 7.4 4.0 - 11.0 10e3/uL    RBC Count 4.38 3.80 - 5.20 10e6/uL    Hemoglobin 12.3 11.7 - 15.7 g/dL    Hematocrit 38.3 35.0 - 47.0 %    MCV 87 78 - 100 fL    MCH 28.1 26.5 - 33.0 pg    MCHC 32.1 31.5 - 36.5 g/dL    RDW 14.4 10.0 - 15.0 %    Platelet Count 266 150 - 450 10e3/uL    % Neutrophils 68 %    % Lymphocytes 22 %    % Monocytes 7 %    % Eosinophils 3 %    % Basophils 0 %    % Immature Granulocytes 0 %    Absolute Neutrophils 5.1 1.6 - 8.3 10e3/uL    Absolute Lymphocytes 1.6 0.8 - 5.3 10e3/uL    Absolute Monocytes 0.5  0.0 - 1.3 10e3/uL    Absolute Eosinophils 0.2 0.0 - 0.7 10e3/uL    Absolute Basophils 0.0 0.0 - 0.2 10e3/uL    Absolute Immature Granulocytes 0.0 <=0.4 10e3/uL   CBC with platelets and differential     Status: None    Narrative    The following orders were created for panel order CBC with platelets and differential.  Procedure                               Abnormality         Status                     ---------                               -----------         ------                     CBC with platelets and d...[[id number]]                      Final result                 Please view results for these tests on the individual orders.

## 2023-05-19 NOTE — PATIENT INSTRUCTIONS
I strongly encouraged you to consider the vaccine to prevent shingles.  (SHINGRIX)  This is two vaccines 2-6 months apart.   Check with your insurance company regarding coverage.  If you are on medicare you should get this vaccine at the pharmacy.       I will notify you of lab results via [employer]   Return urgently if any change in symptoms.    Follow up with rheumatology as scheduled.

## 2023-05-22 ENCOUNTER — MYC MEDICAL ADVICE (OUTPATIENT)
Dept: FAMILY MEDICINE | Facility: CLINIC | Age: 48
End: 2023-05-22
Payer: COMMERCIAL

## 2023-05-22 LAB
ANA PAT SER IF-IMP: ABNORMAL
ANA SER QL IF: POSITIVE
ANA TITR SER IF: ABNORMAL {TITER}

## 2023-05-22 NOTE — RESULT ENCOUNTER NOTE
Dear Audra  Your RICHARD level was positive. This has been positive in the past.   Your inflammatory markers (CRP and sed rate) were normal.  Your thyroid function was normal.   Your electrolytes, blood sugar, kidney function and liver function were normal.    Your ddimer (looking for a blood clot) was negative.  Your blood counts and hemoglobin were normal.   You do not have diabetes.  Not all of your labs are back yet.  Please call or MyChart my office with any questions or concerns.   Jenny Soliman, PAC

## 2023-05-25 LAB — CCP AB SER IA-ACNC: 2 U/ML

## 2023-05-25 NOTE — RESULT ENCOUNTER NOTE
Deapam Zhu  One of your rheumatology tests was negative.   Please call or MyChart my office with any questions or concerns.   Jenny Soliman, PAC

## 2023-06-08 ENCOUNTER — OFFICE VISIT (OUTPATIENT)
Dept: ORTHOPEDICS | Facility: CLINIC | Age: 48
End: 2023-06-08
Payer: COMMERCIAL

## 2023-06-08 ENCOUNTER — ANCILLARY PROCEDURE (OUTPATIENT)
Dept: GENERAL RADIOLOGY | Facility: CLINIC | Age: 48
End: 2023-06-08
Attending: PREVENTIVE MEDICINE
Payer: COMMERCIAL

## 2023-06-08 DIAGNOSIS — M77.12 LEFT TENNIS ELBOW: Primary | ICD-10-CM

## 2023-06-08 DIAGNOSIS — M77.12 LEFT TENNIS ELBOW: ICD-10-CM

## 2023-06-08 DIAGNOSIS — M77.12 LATERAL EPICONDYLITIS OF LEFT ELBOW: ICD-10-CM

## 2023-06-08 PROCEDURE — 73080 X-RAY EXAM OF ELBOW: CPT | Mod: LT | Performed by: RADIOLOGY

## 2023-06-08 PROCEDURE — 99204 OFFICE O/P NEW MOD 45 MIN: CPT | Performed by: PREVENTIVE MEDICINE

## 2023-06-08 RX ORDER — METHYLPREDNISOLONE 4 MG
TABLET, DOSE PACK ORAL
Qty: 21 TABLET | Refills: 0 | Status: SHIPPED | OUTPATIENT
Start: 2023-06-08 | End: 2023-12-13

## 2023-06-08 RX ORDER — MELOXICAM 15 MG/1
15 TABLET ORAL DAILY
Qty: 30 TABLET | Refills: 0 | Status: SHIPPED | OUTPATIENT
Start: 2023-06-08 | End: 2023-12-13

## 2023-06-08 RX ORDER — PENICILLIN V POTASSIUM 500 MG/1
500 TABLET, FILM COATED ORAL 2 TIMES DAILY
COMMUNITY
End: 2023-12-13

## 2023-06-08 NOTE — PROGRESS NOTES
HISTORY OF PRESENT ILLNESS  Ms. Parada is a pleasant 48 year old year old female who presents to clinic today with the following:  What problem are you here for? Left elbow pain     How long have you had this problem? Since April     Have you had any recent imaging of this problem? Xrays/MRI/CT scans? no    Have you had treatments for this problem in the past?  -Medications? no  -Physical therapy? no  -Injections? no  -Surgery? no    How severe is this problem today? 0-10 scale? 5    How severe has this problem been at WORST in the past? 0-10 scale? 7    What do you think caused this problem? Potentially from shoveling    Does this problem or its symptoms cause difficulty for you falling asleep or staying asleep? no    Anything else you want us to know about this problem? no          MEDICAL HISTORY  Patient Active Problem List   Diagnosis     Hypothyroidism     Contact dermatitis and other eczema, due to unspecified cause     Abnormal glandular Papanicolaou smear of cervix     Polyarteritis nodosa (H)     Bladder cancer (H)     CARDIOVASCULAR SCREENING; LDL GOAL LESS THAN 160     Recurrent pregnancy loss     DIANA (generalized anxiety disorder)     Mild intermittent asthma with acute exacerbation     Psoriasis       Current Outpatient Medications   Medication Sig Dispense Refill     albuterol (PROAIR HFA/PROVENTIL HFA/VENTOLIN HFA) 108 (90 Base) MCG/ACT inhaler Inhale 2 puffs into the lungs every 6 hours as needed for shortness of breath / dyspnea or wheezing 8 g 4     clobetasol (TEMOVATE) 0.05 % external ointment APPLY TWICE DAILY AS NEEDED FOR FLARES       fluticasone (FLOVENT DISKUS) 100 MCG/BLIST inhaler Inhale 1 puff into the lungs 2 times daily 60 each 3     ketoconazole (NIZORAL) 2 % external cream        levothyroxine (SYNTHROID/LEVOTHROID) 125 MCG tablet Take 1 tablet (125 mcg) by mouth daily 90 tablet 0     Multiple Vitamins-Minerals (MULTIVITAMIN ADULT PO)        Omega-3 Fatty Acids (OMEGA-3 FISH OIL PO)         Risankizumab-rzaa (SKYRIZI PEN SC)        sertraline (ZOLOFT) 50 MG tablet Take 1 tablet (50 mg) by mouth daily 90 tablet 1     vitamin D3 (CHOLECALCIFEROL) 2000 units tablet Take 1 tablet by mouth daily         Allergies   Allergen Reactions     No Known Drug Allergy        Family History   Problem Relation Age of Onset     Hypertension Mother      Diabetes Father         adult onset-using insulin     Chronic Obstructive Pulmonary Disease Father      C.A.D. Maternal Grandmother         CABG at 63     Alcohol/Drug Maternal Grandfather      C.A.D. Paternal Grandmother         MI--70's-80's     Respiratory Paternal Grandfather         asthma     Social History     Socioeconomic History     Marital status:      Number of children: 0     Years of education: 16   Occupational History     Occupation: marketing     Comment: [employer]   Tobacco Use     Smoking status: Never     Smokeless tobacco: Never   Substance and Sexual Activity     Alcohol use: Yes     Comment: 3-5/ week     Drug use: No     Sexual activity: Yes     Partners: Male     Birth control/protection: Female Surgical   Other Topics Concern      Service No     Blood Transfusions No     Caffeine Concern No     Occupational Exposure No     Hobby Hazards No     Sleep Concern No     Stress Concern Yes     Comment: workplace     Weight Concern No     Special Diet No     Back Care No     Exercise No     Bike Helmet No     Seat Belt No     Self-Exams No     Parent/sibling w/ CABG, MI or angioplasty before 65F 55M? No   Social History Narrative    Social Documentation:        Balanced Diet: YES    Calcium intake: 2-3 serving of dairy per day plus a MVI    Caffeine: none per day    Exercise:  type of activity treadmill, walking, wt lifting with upper body;  3-4 times per week    Sunscreen: Yes    Seatbelts:  Yes    Self Breast Exam:  Yes    Self Testicular Exam: No - n/a    Physical/Emotional/Sexual Abuse: seeing pyschiatrist for  depression- doing well on wellbutrin    Do you feel safe in your environment? Yes        Cholesterol screen up to date: No - last chol in 2002- no fasting test done- would like tested today    Eye Exam up to date: Yes    Dental Exam up to date: Yes    Pap smear up to date: Yes    Mammogram up to date: Does Not Apply    Dexa Scan up to date: Does Not Apply    Colonoscopy up to date: Does Not Apply    Immunizations up to date: Yes-Td in 1998- Hep b completed.      Glucose screen if over 40:  No - N/A--but would like to be tested due to father with DM                                   Additional medical/Social/Surgical histories reviewed in [facility] and updated as appropriate.     REVIEW OF SYSTEMS (6/8/2023)  10 point ROS of systems including Constitutional, Eyes, Respiratory, Cardiovascular, Gastroenterology, Genitourinary, Integumentary, Musculoskeletal, Psychiatric, Allergic/Immunologic were all negative except for pertinent positives noted in my HPI.     PHYSICAL EXAM  VSS    General  - normal appearance, in no obvious distress  HEENT  - conjunctivae not injected, moist mucous membranes, normocephalic/atraumatic head, ears normal appearance, no lesions, mouth normal appearance, no scars, normal dentition and teeth present  CV  - normal radial pulse  Pulm  - normal respiratory pattern, non-labored  Musculoskeletal - left elbow  - inspection: normal joint alignment, no obvious deformity, mild soft tissue swelling laterally  - palpation: tender at the origin of the common extensor tendon  - ROM:  160 deg flexion   0 deg extension   90 deg pronation   90 deg supination  - strength: 5/5 wrist extension with elbow flexed, 4/5 with elbow extended, painful resisted extension of long finger with elbow flexed, worse with extension, 5/5  strength  - special tests:  (-) varus  (-) valgus  (-) Tinel's  Neuro  - no sensory or motor deficit, grossly normal coordination, normal muscle tone  Skin  - no ecchymosis, erythema,  warmth, or induration, no obvious rash  Psych  - interactive, appropriate, normal mood and affect    ASSESSMENT & PLAN  47 yo female with left lateral epicondylitis    I independently reviewed the following imaging studies:  Left elbow xray: normal  Discussed and given HEP  And medrol pack and mobic PRN  Ice PRN  F/u in 3 weeks If not improving  Patient has been doing home exercise physical therapy program for this problem      Appropriate PPE was utilized for prevention of spread of Covid-19.  Ravindra Joseph MD, CAQSM

## 2023-06-08 NOTE — LETTER
6/8/2023         RE: Audra Parada  [address]  [place] [place] [address]        Dear Colleague,    Thank you for referring your patient, Audra Parada, to the [facility] [place]. Please see a copy of my visit note below.    HISTORY OF PRESENT ILLNESS  Ms. Parada is a pleasant 48 year old year old female who presents to clinic today with the following:  What problem are you here for? Left elbow pain     How long have you had this problem? Since April     Have you had any recent imaging of this problem? Xrays/MRI/CT scans? no    Have you had treatments for this problem in the past?  -Medications? no  -Physical therapy? no  -Injections? no  -Surgery? no    How severe is this problem today? 0-10 scale? 5    How severe has this problem been at WORST in the past? 0-10 scale? 7    What do you think caused this problem? Potentially from shoveling    Does this problem or its symptoms cause difficulty for you falling asleep or staying asleep? no    Anything else you want us to know about this problem? no          MEDICAL HISTORY  Patient Active Problem List   Diagnosis     Hypothyroidism     Contact dermatitis and other eczema, due to unspecified cause     Abnormal glandular Papanicolaou smear of cervix     Polyarteritis nodosa (H)     Bladder cancer (H)     CARDIOVASCULAR SCREENING; LDL GOAL LESS THAN 160     Recurrent pregnancy loss     DIANA (generalized anxiety disorder)     Mild intermittent asthma with acute exacerbation     Psoriasis       Current Outpatient Medications   Medication Sig Dispense Refill     albuterol (PROAIR HFA/PROVENTIL HFA/VENTOLIN HFA) 108 (90 Base) MCG/ACT inhaler Inhale 2 puffs into the lungs every 6 hours as needed for shortness of breath / dyspnea or wheezing 8 g 4     clobetasol (TEMOVATE) 0.05 % external ointment APPLY TWICE DAILY AS NEEDED FOR FLARES       fluticasone (FLOVENT DISKUS) 100 MCG/BLIST inhaler Inhale 1 puff into the lungs 2 times daily  60 each 3     ketoconazole (NIZORAL) 2 % external cream        levothyroxine (SYNTHROID/LEVOTHROID) 125 MCG tablet Take 1 tablet (125 mcg) by mouth daily 90 tablet 0     Multiple Vitamins-Minerals (MULTIVITAMIN ADULT PO)        Omega-3 Fatty Acids (OMEGA-3 FISH OIL PO)        Risankizumab-rzaa (SKYRIZI PEN SC)        sertraline (ZOLOFT) 50 MG tablet Take 1 tablet (50 mg) by mouth daily 90 tablet 1     vitamin D3 (CHOLECALCIFEROL) 2000 units tablet Take 1 tablet by mouth daily         Allergies   Allergen Reactions     No Known Drug Allergy        Family History   Problem Relation Age of Onset     Hypertension Mother      Diabetes Father         adult onset-using insulin     Chronic Obstructive Pulmonary Disease Father      C.A.D. Maternal Grandmother         CABG at 63     Alcohol/Drug Maternal Grandfather      OSMANIAKB. Paternal Grandmother         MI--70's-80's     Respiratory Paternal Grandfather         asthma     Social History     Socioeconomic History     Marital status:      Number of children: 0     Years of education: 16   Occupational History     Occupation: marketing     Comment: [employer]   Tobacco Use     Smoking status: Never     Smokeless tobacco: Never   Substance and Sexual Activity     Alcohol use: Yes     Comment: 3-5/ week     Drug use: No     Sexual activity: Yes     Partners: Male     Birth control/protection: Female Surgical   Other Topics Concern      Service No     Blood Transfusions No     Caffeine Concern No     Occupational Exposure No     Hobby Hazards No     Sleep Concern No     Stress Concern Yes     Comment: workplace     Weight Concern No     Special Diet No     Back Care No     Exercise No     Bike Helmet No     Seat Belt No     Self-Exams No     Parent/sibling w/ CABG, MI or angioplasty before 65F 55M? No   Social History Narrative    Social Documentation:        Balanced Diet: YES    Calcium intake: 2-3 serving of dairy per day plus a MVI    Caffeine: none  per day    Exercise:  type of activity treadmill, walking, wt lifting with upper body;  3-4 times per week    Sunscreen: Yes    Seatbelts:  Yes    Self Breast Exam:  Yes    Self Testicular Exam: No - n/a    Physical/Emotional/Sexual Abuse: seeing pyschiatrist for depression- doing well on wellbutrin    Do you feel safe in your environment? Yes        Cholesterol screen up to date: No - last chol in 2002- no fasting test done- would like tested today    Eye Exam up to date: Yes    Dental Exam up to date: Yes    Pap smear up to date: Yes    Mammogram up to date: Does Not Apply    Dexa Scan up to date: Does Not Apply    Colonoscopy up to date: Does Not Apply    Immunizations up to date: Yes-Td in 1998- Hep b completed.      Glucose screen if over 40:  No - N/A--but would like to be tested due to father with DM                                   Additional medical/Social/Surgical histories reviewed in [facility] and updated as appropriate.     REVIEW OF SYSTEMS (6/8/2023)  10 point ROS of systems including Constitutional, Eyes, Respiratory, Cardiovascular, Gastroenterology, Genitourinary, Integumentary, Musculoskeletal, Psychiatric, Allergic/Immunologic were all negative except for pertinent positives noted in my HPI.     PHYSICAL EXAM  VSS    General  - normal appearance, in no obvious distress  HEENT  - conjunctivae not injected, moist mucous membranes, normocephalic/atraumatic head, ears normal appearance, no lesions, mouth normal appearance, no scars, normal dentition and teeth present  CV  - normal radial pulse  Pulm  - normal respiratory pattern, non-labored  Musculoskeletal - left elbow  - inspection: normal joint alignment, no obvious deformity, mild soft tissue swelling laterally  - palpation: tender at the origin of the common extensor tendon  - ROM:  160 deg flexion   0 deg extension   90 deg pronation   90 deg supination  - strength: 5/5 wrist extension with elbow flexed, 4/5 with elbow extended, painful resisted  extension of long finger with elbow flexed, worse with extension, 5/5  strength  - special tests:  (-) varus  (-) valgus  (-) Tinel's  Neuro  - no sensory or motor deficit, grossly normal coordination, normal muscle tone  Skin  - no ecchymosis, erythema, warmth, or induration, no obvious rash  Psych  - interactive, appropriate, normal mood and affect    ASSESSMENT & PLAN  49 yo female with left lateral epicondylitis    I independently reviewed the following imaging studies:  Left elbow xray: normal  Discussed and given HEP  And medrol pack and mobic PRN  Ice PRN  F/u in 3 weeks If not improving  Patient has been doing home exercise physical therapy program for this problem      Appropriate PPE was utilized for prevention of spread of Covid-19.  Ravindra Joseph MD, CAM        Again, thank you for allowing me to participate in the care of your patient.        Sincerely,        Ravindra Joseph MD

## 2023-06-28 ENCOUNTER — TELEPHONE (OUTPATIENT)
Dept: ORTHOPEDICS | Facility: CLINIC | Age: 48
End: 2023-06-28

## 2023-06-28 ENCOUNTER — VIRTUAL VISIT (OUTPATIENT)
Dept: ORTHOPEDICS | Facility: CLINIC | Age: 48
End: 2023-06-28
Payer: COMMERCIAL

## 2023-06-28 DIAGNOSIS — M77.12 LATERAL EPICONDYLITIS OF LEFT ELBOW: Primary | ICD-10-CM

## 2023-06-28 DIAGNOSIS — M77.12 LEFT TENNIS ELBOW: ICD-10-CM

## 2023-06-28 PROCEDURE — 99213 OFFICE O/P EST LOW 20 MIN: CPT | Mod: 93 | Performed by: PREVENTIVE MEDICINE

## 2023-06-28 NOTE — PROGRESS NOTES
Patient is a  48   year old who is being evaluated via a billable telephone visit.      What phone number would you like to be contacted at? CELL  How would you like to obtain your AVS? BIPIN        Subjective   Patient is a  48   year old who presents by phone call visit for the following:     HPI   F/u for left elbow pain  Medrol pack resolved her symptoms  But then started to return  Has started MObic and continued HEP  Still having some discomfort with activities, but better    Review of Systems   Constitutional, HEENT, cardiovascular, pulmonary, gi and gu systems are negative, except as otherwise noted.      Objective           Vitals:  No vitals were obtained today due to virtual visit.    Physical Exam   healthy, alert and no distress  PSYCH: Alert and oriented times 3; coherent speech, normal   rate and volume, able to articulate logical thoughts, able   to abstract reason, no tangential thoughts, no hallucinations   or delusions  His affect is normal  RESP: No cough, no audible wheezing, able to talk in full sentences  Remainder of exam unable to be completed due to telephone visits    Assessment/Plan  49 yo female with left lateral epicondylitis, not resolved    I independently reviewed the following imaging studies and discussed with patient:  Left elbow xray; shows no significant arthritis  Discussed and ordered hand therapy  F/u in 3-4 weeks  Cont. mobic   And topical otcs  Start hand therapy asap          Phone call duration: 20 minutes  Phone call start: 910am  Phone call end: 930am  Dr Joseph

## 2023-06-28 NOTE — TELEPHONE ENCOUNTER
Left Voicemail (1st Attempt) for the patient to call back and schedule the following:    Appointment type: hand therapy referral   Specialty phone number: [phone]    Stephanie jennings Procedure   Orthopedics, Podiatry, Sports Medicine, Ent ,Eye , Audiology, Adult Endocrine & Diabetes, Nutrition & Medication Therapy Management Specialties   [facility]

## 2023-07-12 ENCOUNTER — THERAPY VISIT (OUTPATIENT)
Dept: OCCUPATIONAL THERAPY | Facility: CLINIC | Age: 48
End: 2023-07-12
Attending: PREVENTIVE MEDICINE
Payer: COMMERCIAL

## 2023-07-12 DIAGNOSIS — M25.522 LEFT ELBOW PAIN: ICD-10-CM

## 2023-07-12 DIAGNOSIS — M77.12 LATERAL EPICONDYLITIS OF LEFT ELBOW: ICD-10-CM

## 2023-07-12 DIAGNOSIS — M77.12 LEFT TENNIS ELBOW: ICD-10-CM

## 2023-07-12 PROCEDURE — 97530 THERAPEUTIC ACTIVITIES: CPT | Mod: GO

## 2023-07-12 PROCEDURE — 97165 OT EVAL LOW COMPLEX 30 MIN: CPT | Mod: GO

## 2023-07-12 NOTE — PROGRESS NOTES
OCCUPATIONAL THERAPY EVALUATION  Type of Visit: Evaluation    See electronic medical record for Abuse and Falls Screening details.    Subjective      Presenting condition or subjective complaint: Left elbow & lower arm pain  Date of onset: 04/01/23    Relevant medical history: Asthma; Bladder or bowel problems; Cancer; Concussions; Depression; Menopause; Thyroid problems   Dates & types of surgery:      Prior diagnostic imaging/testing results: X-ray     Prior therapy history for the same diagnosis, illness or injury: Yes accupuncture & chiropractor    Prior Level of Function   Transfers: Independent  Ambulation: Independent  ADL: Independent    Patient goals for therapy: strength training    Pain assessment: Pain present  See objective evaluation for additional pain details     Objective:  Right hand dominant  Patient reports symptoms of pain, weakness/loss of strength and edema    Pain Level (Scale 0-10)   7/12/2023   At Rest 0/10   With Use 6/10     Pain Description  Date 7/12/2023   Location L LEP & extensor wad   Pain Quality Aching and Dull   Frequency intermittent or daily     Pain is worst daytime   Exacerbated by Working out, walking for long distances   Relieved by Wrist brace, Prednisone   Progression Unchanged     Posture  Rounded Forward Shoulders    Sensation  Decreased Radial Nerve distribution per pt report    ROM  Pain Report: - none  + mild    ++ moderate    +++ severe   Wrist 7/12/2023 7/12/2023   AROM (PROM) R L   Extension 72 74   Flexion 80 79   RD 16 15   UD 30 38     Resisted Testing  Pain Report:  - none    + mild    ++ moderate    +++ severe    7/12/2023   Elbow Extension 5/5   Elbow Flexion 5/5 +   Supination  5/5   Pronation 5/5   Wrist Ext with RD, Elbow at side 5/5    Wrist Ext with UD, Elbow at side 5/5 +   Wrist Ext with RD, Elbow Ext 5/5 +   Wrist Ext with UD, Elbow Ext 5/5    Wrist Flex with RD, Elbow at side 5/5 +   Wrist Flex with UD, Elbow at side 5/5   Wrist Flex with RD, Elbow  Ext 5/5 +   Wrist Flex with UD, Elbow Ext 5/5   EDC with Elbow at side 5/5 +   Long Finger Test 5/5 ++     Neural Tension Testing  RNT: Radial Neurodynamic Test (based on MACIEL Tijerina's ULNT)   7/12/2023   0-5 Scale 2/5   Position:   0/5: Arm across abdomen in coronal plane  1/5: Depress shoulder, ER to neutral ABD shoulder to 45 degrees  2/5: IR shoulder to end range, keep elbow at 90 degrees  3/5: Extend elbow to 0 degrees  4/5: Fully pronate forearm  5/5: Flex wrist and fingers with UD  Notes:    (+) indicates beyond grade level but less than FCI to next level    (-) indicates over FCI to level    S1  onset/change of patient's symptoms    S2 definite stop point based on patient's discomfort level    Strength   (Measured in pounds)  Pain Report: - none  + mild    ++ moderate    +++ severe    7/12/2023 7/12/2023   Trials R L   1  2  3 70 49   Average 70 49       7/12/2023 7/12/2023    R L   Elbow Ext 65 47+     Palpation  Pain Report:  - none    + mild    ++ moderate    +++ severe    7/12/2023   Proximal Triceps -   Spiral Groove -   Distal Triceps -   Anconeus +   ECRB at LEP -   ECU at LEP +   EDC at LEP +   Radial Head +   Extensor Wad -   PIN Site ++     Assessment & Plan   CLINICAL IMPRESSIONS   Medical Diagnosis: L LEP    Treatment Diagnosis: L LEP    Impression/Assessment: Pt is a 48 year old female presenting to Occupational Therapy due to L LEP.  The following significant findings have been identified: Impaired sensation, Impaired strength and Pain.  These identified deficits interfere with their ability to perform work tasks, recreational activities, household chores,  yard work and meal planning and preparation as compared to previous level of function.   Patient's limitations or Problem List includes: Pain, Weakness, Sensory disturbance, Decreased  and Tightness in musculature of the left elbow which interferes with the patient's ability to perform Self Care Tasks (buttons), Work Tasks,  Sleep Patterns, Recreational Activities and Household Chores as compared to previous level of function.    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, meal preparation and cleanup, shopping, sleep, work, leisure activities and buttons  Clinical Decision Making (Complexity): Low complexity    PLAN OF CARE  Treatment Interventions:   Modalities:  US  Therapeutic Exercise:  AROM, AAROM, PROM, Isotonics and Isometrics  Neuromuscular re-education:  Nerve Gliding and Kinesiotaping  Manual Techniques:  Friction massage and Myofascial release  Orthotic Fabrication:  Static  Self Care:  Self Care Tasks, Ergonomic Considerations and Work Tasks    Long Term Goals   OT Goal 1  Goal Identifier: (P) Lifting  Goal Description: (P) Patient will report no difficulty when lifting a shopping bag  Rationale: (P) In order to safely and appropriately apply compensatory strategies with ADL/IADL performance  Goal Progress: (P) Moderate Difficulty  Target Date: (P) 09/06/23      Frequency of Treatment: 1x weekly  Duration of Treatment: 8 weeks     Education Assessment: Learner/Method: (P) Patient;Pictures/Video;Demonstration     Risks and benefits of evaluation/treatment have been explained.   Patient/Family/caregiver agrees with Plan of Care.     Evaluation Time:    OT Eval, Low Complexity Minutes (48287): (P) 24    Signing Clinician: Georgia Ellis OT

## 2023-07-18 ENCOUNTER — MYC MEDICAL ADVICE (OUTPATIENT)
Dept: FAMILY MEDICINE | Facility: CLINIC | Age: 48
End: 2023-07-18
Payer: COMMERCIAL

## 2023-07-19 ENCOUNTER — THERAPY VISIT (OUTPATIENT)
Dept: OCCUPATIONAL THERAPY | Facility: CLINIC | Age: 48
End: 2023-07-19
Payer: COMMERCIAL

## 2023-07-19 DIAGNOSIS — M77.12 LATERAL EPICONDYLITIS OF LEFT ELBOW: Primary | ICD-10-CM

## 2023-07-19 DIAGNOSIS — M77.12 LEFT TENNIS ELBOW: ICD-10-CM

## 2023-07-19 DIAGNOSIS — M25.522 LEFT ELBOW PAIN: ICD-10-CM

## 2023-07-19 PROCEDURE — 97140 MANUAL THERAPY 1/> REGIONS: CPT | Mod: GO

## 2023-07-19 PROCEDURE — 97035 APP MDLTY 1+ULTRASOUND EA 15: CPT | Mod: GO

## 2023-07-19 PROCEDURE — 97112 NEUROMUSCULAR REEDUCATION: CPT | Mod: GO

## 2023-07-26 ENCOUNTER — THERAPY VISIT (OUTPATIENT)
Dept: OCCUPATIONAL THERAPY | Facility: CLINIC | Age: 48
End: 2023-07-26
Payer: COMMERCIAL

## 2023-07-26 DIAGNOSIS — M25.522 LEFT ELBOW PAIN: ICD-10-CM

## 2023-07-26 DIAGNOSIS — M77.12 LEFT TENNIS ELBOW: ICD-10-CM

## 2023-07-26 DIAGNOSIS — M77.12 LATERAL EPICONDYLITIS OF LEFT ELBOW: Primary | ICD-10-CM

## 2023-07-26 PROCEDURE — 97035 APP MDLTY 1+ULTRASOUND EA 15: CPT | Mod: GO

## 2023-07-26 PROCEDURE — 97112 NEUROMUSCULAR REEDUCATION: CPT | Mod: GO

## 2023-07-26 PROCEDURE — 97140 MANUAL THERAPY 1/> REGIONS: CPT | Mod: GO

## 2023-08-02 ENCOUNTER — THERAPY VISIT (OUTPATIENT)
Dept: OCCUPATIONAL THERAPY | Facility: CLINIC | Age: 48
End: 2023-08-02
Payer: COMMERCIAL

## 2023-08-02 DIAGNOSIS — M25.522 LEFT ELBOW PAIN: ICD-10-CM

## 2023-08-02 DIAGNOSIS — M77.12 LEFT TENNIS ELBOW: ICD-10-CM

## 2023-08-02 DIAGNOSIS — M77.12 LATERAL EPICONDYLITIS OF LEFT ELBOW: Primary | ICD-10-CM

## 2023-08-02 PROCEDURE — 97140 MANUAL THERAPY 1/> REGIONS: CPT | Mod: GO

## 2023-08-02 PROCEDURE — 97112 NEUROMUSCULAR REEDUCATION: CPT | Mod: GO

## 2023-08-11 ENCOUNTER — VIRTUAL VISIT (OUTPATIENT)
Dept: FAMILY MEDICINE | Facility: CLINIC | Age: 48
End: 2023-08-11
Payer: COMMERCIAL

## 2023-08-11 DIAGNOSIS — L40.9 PSORIASIS: Primary | ICD-10-CM

## 2023-08-11 DIAGNOSIS — E03.9 HYPOTHYROIDISM, UNSPECIFIED TYPE: ICD-10-CM

## 2023-08-11 DIAGNOSIS — R63.5 ABNORMAL WEIGHT GAIN: ICD-10-CM

## 2023-08-11 PROCEDURE — 99214 OFFICE O/P EST MOD 30 MIN: CPT | Mod: VID | Performed by: FAMILY MEDICINE

## 2023-08-11 RX ORDER — LEVOTHYROXINE SODIUM 137 UG/1
137 TABLET ORAL DAILY
Qty: 30 TABLET | Refills: 1 | Status: SHIPPED | OUTPATIENT
Start: 2023-08-11 | End: 2023-09-08

## 2023-08-11 RX ORDER — PHENTERMINE HYDROCHLORIDE 15 MG/1
15 CAPSULE ORAL EVERY MORNING
Qty: 30 CAPSULE | Refills: 1 | Status: SHIPPED | OUTPATIENT
Start: 2023-08-11 | End: 2023-10-10

## 2023-08-11 NOTE — PROGRESS NOTES
"Audra is a 48 year old who is being evaluated via a billable video visit.      How would you like to obtain your AVS? MyChart  If the video visit is dropped, the invitation should be resent by: Text to cell phone: [phone]  Will anyone else be joining your video visit? No          Assessment & Plan     Psoriasis  On skyrizi -   Doubt that it is contributing to weight gain but started one year ago    Hypothyroidism, unspecified type   TSH was in the 3 range  Discussed increasing dose to the 137mcg daily to see if helpful   Will recheck TSH in 6 weeks   - levothyroxine (SYNTHROID/LEVOTHROID) 137 MCG tablet; Take 1 tablet (137 mcg) by mouth daily  - TSH with free T4 reflex; Future    Abnormal weight gain   Abnormal weight gain   Possible multifactorial but 24-30 pounds despite exercise and eating healthy  Will trial phentermine to see if tolerated and helpful  Follow-up in 6 weeks   Pt will call or RTC if symptoms worsen or do not improve.   - Cortisol; Future  - Follicle stimulating hormone; Future  - phentermine (ADIPEX-P) 15 MG capsule; Take 1 capsule (15 mg) by mouth every morning             BMI:   Estimated body mass index is 32.74 kg/m  as calculated from the following:    Height as of 5/19/23: 1.625 m (5' 3.98\").    Weight as of 5/19/23: 86.5 kg (190 lb 9.6 oz).           Michelle Caldwell [facility]   Audra is a 48 year old, presenting for the following health issues:  Weight Problem      HPI     Concern - Weight gain   Onset: Since last fall 25 to 30 pounds.  Has continued to gain weight since her last visit   Eating well and exercising   No matter what she was doing     Father diagnosed with adult onset diabetes    Taking skyrizi from dermatologist for psoriasis  Started August 2022    Menses - more irregular  Hot flashes - seeing acupuncture and chinese herbs which have helped     Family history diabetes in father  Mom with kevin blood pressure    Having trouble " "with tennis elbow - following with Dr. Joseph and treated with steroid        Review of Systems         Objective    Vitals - Patient Reported  Weight (Patient Reported): 86.2 kg (190 lb)  Height (Patient Reported): 160 cm (5' 3\")  BMI (Based on Pt Reported Ht/Wt): 33.66  Pain Score: No Pain (0)        Physical Exam   GENERAL: alert and no distress  EYES: Eyes grossly normal to inspection.  No discharge or erythema, or obvious scleral/conjunctival abnormalities.  RESP: No audible wheeze, cough, or visible cyanosis.  No visible retractions or increased work of breathing.    SKIN: Visible skin clear. No significant rash, abnormal pigmentation or lesions.  NEURO: Cranial nerves grossly intact.  Mentation and speech appropriate for age.  PSYCH: Mentation appears normal, affect normal/bright, judgement and insight intact, normal speech and appearance well-groomed.                Video-Visit Details    Type of service:  Video Visit     Originating Location (pt. Location): Other cabin    Distant Location (provider location):  On-site  Platform used for Video Visit: Felice"

## 2023-08-15 ENCOUNTER — THERAPY VISIT (OUTPATIENT)
Dept: OCCUPATIONAL THERAPY | Facility: CLINIC | Age: 48
End: 2023-08-15
Payer: COMMERCIAL

## 2023-08-15 DIAGNOSIS — M77.12 LATERAL EPICONDYLITIS OF LEFT ELBOW: Primary | ICD-10-CM

## 2023-08-15 DIAGNOSIS — M25.522 LEFT ELBOW PAIN: ICD-10-CM

## 2023-08-15 DIAGNOSIS — M77.12 LEFT TENNIS ELBOW: ICD-10-CM

## 2023-08-15 PROCEDURE — 97110 THERAPEUTIC EXERCISES: CPT | Mod: GO

## 2023-08-15 PROCEDURE — 97140 MANUAL THERAPY 1/> REGIONS: CPT | Mod: GO

## 2023-08-23 ENCOUNTER — THERAPY VISIT (OUTPATIENT)
Dept: OCCUPATIONAL THERAPY | Facility: CLINIC | Age: 48
End: 2023-08-23
Payer: COMMERCIAL

## 2023-08-23 DIAGNOSIS — F41.1 GAD (GENERALIZED ANXIETY DISORDER): ICD-10-CM

## 2023-08-23 DIAGNOSIS — M77.12 LATERAL EPICONDYLITIS OF LEFT ELBOW: Primary | ICD-10-CM

## 2023-08-23 DIAGNOSIS — M77.12 LEFT TENNIS ELBOW: ICD-10-CM

## 2023-08-23 DIAGNOSIS — M25.522 LEFT ELBOW PAIN: ICD-10-CM

## 2023-08-23 PROCEDURE — 97110 THERAPEUTIC EXERCISES: CPT | Mod: GO

## 2023-08-23 PROCEDURE — 97112 NEUROMUSCULAR REEDUCATION: CPT | Mod: GO

## 2023-08-23 NOTE — PROGRESS NOTES
"   08/23/23 0500   Appointment Info   Treating Provider Georgia Ellis, OTR/L   Total/Authorized Visits 8 (POC)   Visits Used 6   Medical Diagnosis L LEP   OT Tx Diagnosis L LEP   Progress Note/Certification   Onset of Illness/Injury or Date of Surgery 04/01/23   Therapy Frequency 1x every 3 weeks   Predicted Duration 6 weeks   Progress Note Due Date 10/04/23   Progress Note Completed Date 08/23/23   Goals   OT Goals 1   OT Goal 1   Goal Identifier Lifting   Goal Description Patient will report no difficulty when lifting a shopping bag   Rationale In order to safely and appropriately apply compensatory strategies with ADL/IADL performance   Goal Progress Mild Difficulty   Target Date 10/04/23   Subjective Report   Subjective Report \"I feel like I'm kind of starting to turn a corner. I noticed that recently the pain is more of a 3-4 instead of constantly being a 6. The strengthening is going good.\"   Objective Measures   Objective Measures Objective Measure 1;Objective Measure 2;Objective Measure 3;Objective Measure 4;Objective Measure 5   Objective Measure 1   Objective Measure Pain Scale   Details 3-4/10 pain   Objective Measure 2   Objective Measure Resisted Testing  Pain Report:  - none    + mild    ++ moderate    +++ severe   Details Elbow Flexion: 5/5+ Wrist Ext with UD, Elbow at side: 5/5- Wrist Ext with RD, Elbow Ext: 5/5- Wrist Flex with RD, Elbow at side: 5/5+ Wrist Flex with RD, Elbow Ext: 5/5- EDC with Elbow at side: 5/5- Long Finger Test: 5/5+   Objective Measure 3   Objective Measure RNT: Radial Neurodynamic Test (based on DS Lilliana's ULNT)   Details Between 2/5: IR shoulder to end range, keep elbow at 90 degrees  3/5: Extend elbow to 0 degrees   Objective Measure 4   Objective Measure Strength   (Measured in pounds)  Pain Report: - none  + mild    ++ moderate    +++ severe   Details L: 56lbs Elbow Ext: 50lbs   Objective Measure 5   Objective Measure Palpation  Pain Report:  - none    + mild    ++ " moderate    +++ severe   Details Anconeus: + ECU at LEP: + EDC at LEP: - Radial Head: -PIN Site: ++   Treatment Interventions (OT)   Interventions Neuromuscular Re-education;Therapeutic Procedure/Exercise   Neuromuscular Re-education   Neuromuscular Re-ed Minutes (28403) 14   Neuro Re-ed 1 Passive Radial Nerve Gliding   Neuro Re-ed 1 - Details 1 set, 20 reps, 5 sec. hold, sitting   PTRx Neuro Re-ed 1 Nerve Flossing Mildly Irritable Radial Nerve   PTRx Neuro Re-ed 1 - Details 1-2x daily, 10 reps, reviewed, HEP   Skilled Intervention To reduce neural tension   Therapeutic Procedure/Exercise   Therapeutic Procedure: strength, endurance, ROM, flexibillity minutes (86844) 12   PTRx Ther Proc 1 Forearm Passive Range of Motion Extensor Stretch   PTRx Ther Proc 1 - Details No Notes   PTRx Ther Proc 2 Forearm PROM Advanced Flexor Stretch   PTRx Ther Proc 2 - Details No Notes   PTRx Ther Proc 3 Wrist Isotonic Strengthening Radial Deviation   PTRx Ther Proc 3 - Details No Notes   Ther Proc 1 PN   Ther Proc 1 - Details Please see the objective data gathered above for today's PN   Therapeutic Activity   PTRx Ther Act 1 TENNIS ELBOW PREVENTION   PTRx Ther Act 1 - Details HEP   PTRx Ther Act 2 Warmth   PTRx Ther Act 2 - Details HEP   PTRx Ther Act 3 Ball Massage to Extensors   PTRx Ther Act 3 - Details HEP   PTRx Ther Act 4 Friction Massage   PTRx Ther Act 4 - Details HEP   PTRx Ther Act 5 Icing   PTRx Ther Act 5 - Details HEP   Manual Therapy   Manual Therapy Manual Therapy 2   Manual Therapy 1 MFR to extensors   Manual Therapy 1 - Details Moderate pressure   Manual Therapy 2 Friction massage   Manual Therapy 2 - Details Moderate pressure across tendons   Skilled Intervention To reduce muscular tension, stiffness, and pain   Education   Learner/Method Patient;Pictures/Video;Demonstration   Plan   Home program See PTRX   Plan for next session PN   Total Session Time   Timed Code Treatment Minutes 26   Total Treatment Time (sum of  timed and untimed services) 26       PLAN  Continue therapy per current plan of care.    Beginning/End Dates of Progress Note Reporting Period:  07/12/2023 to 08/23/2023    Referring Provider:  Ravindra Joseph

## 2023-08-23 NOTE — TELEPHONE ENCOUNTER
"Requested Prescriptions   Pending Prescriptions Disp Refills    sertraline (ZOLOFT) 50 MG tablet [Pharmacy Med Name: SERTRALINE 50MG TABLETS] 90 tablet 1     Sig: TAKE 1 TABLET(50 MG) BY MOUTH DAILY       SSRIs Protocol Passed - 8/23/2023 12:40 PM        Passed - Recent (12 mo) or future (30 days) visit within the authorizing provider's specialty     Patient has had an office visit with the authorizing provider or a provider within the authorizing providers department within the previous 12 mos or has a future within next 30 days. See \"Patient Info\" tab in inbasket, or \"Choose Columns\" in Meds & Orders section of the refill encounter.              Passed - Medication is active on med list        Passed - Patient is age 18 or older        Passed - No active pregnancy on record        Passed - No positive pregnancy test in last 12 months      Routing refill request to provider for review/approval because:  Drug interaction warning    meloxicam (MOBIC) 15 MG tablet     Thanks,   Hieu Gonzalez RN  [facility]     "

## 2023-09-08 DIAGNOSIS — E03.9 HYPOTHYROIDISM, UNSPECIFIED TYPE: ICD-10-CM

## 2023-09-08 RX ORDER — LEVOTHYROXINE SODIUM 137 UG/1
137 TABLET ORAL DAILY
Qty: 90 TABLET | Refills: 0 | Status: SHIPPED | OUTPATIENT
Start: 2023-09-08 | End: 2023-12-05

## 2023-09-08 NOTE — TELEPHONE ENCOUNTER
"Requested Prescriptions   Pending Prescriptions Disp Refills    levothyroxine (SYNTHROID/LEVOTHROID) 137 MCG tablet [Pharmacy Med Name: LEVOTHYROXINE 0.137MG (137MCG) TAB] 90 tablet      Sig: TAKE 1 TABLET(137 MCG) BY MOUTH DAILY       Thyroid Protocol Passed - 9/8/2023  7:52 AM        Passed - Patient is 12 years or older        Passed - Recent (12 mo) or future (30 days) visit within the authorizing provider's specialty     Patient has had an office visit with the authorizing provider or a provider within the authorizing providers department within the previous 12 mos or has a future within next 30 days. See \"Patient Info\" tab in inbasket, or \"Choose Columns\" in Meds & Orders section of the refill encounter.              Passed - Medication is active on med list        Passed - Normal TSH on file in past 12 months     Recent Labs   Lab Test 05/19/23  1404   TSH 3.24              Passed - No active pregnancy on record     If patient is pregnant or has had a positive pregnancy test, please check TSH.          Passed - No positive pregnancy test in past 12 months     If patient is pregnant or has had a positive pregnancy test, please check TSH.              Prescription approved per [facility] Refill Protocol.     Pt has a appointment on 11/01/23    Jeannine Carroll RN  [facility]   "

## 2023-10-08 DIAGNOSIS — R63.5 ABNORMAL WEIGHT GAIN: ICD-10-CM

## 2023-10-09 ENCOUNTER — OFFICE VISIT (OUTPATIENT)
Dept: ORTHOPEDICS | Facility: CLINIC | Age: 48
End: 2023-10-09
Payer: COMMERCIAL

## 2023-10-09 ENCOUNTER — MYC MEDICAL ADVICE (OUTPATIENT)
Dept: FAMILY MEDICINE | Facility: CLINIC | Age: 48
End: 2023-10-09

## 2023-10-09 DIAGNOSIS — M77.12 LATERAL EPICONDYLITIS OF LEFT ELBOW: Primary | ICD-10-CM

## 2023-10-09 DIAGNOSIS — M77.12 LEFT TENNIS ELBOW: ICD-10-CM

## 2023-10-09 PROCEDURE — 99214 OFFICE O/P EST MOD 30 MIN: CPT | Performed by: PREVENTIVE MEDICINE

## 2023-10-09 RX ORDER — METHYLPREDNISOLONE 4 MG
TABLET, DOSE PACK ORAL
Qty: 21 TABLET | Refills: 0 | Status: SHIPPED | OUTPATIENT
Start: 2023-10-09 | End: 2023-11-01

## 2023-10-09 NOTE — LETTER
10/9/2023         RE: Audra Parada  [address]  [place] [address]        Dear Colleague,    Thank you for referring your patient, Audra Parada, to the [facility] [place]. Please see a copy of my visit note below.    HISTORY OF PRESENT ILLNESS  Ms. Parada is a pleasant 48 year old year old female who presents to clinic today with the following:  What problem are you here for? Left elbow pain   Improved overall, not resolved  Has been doing HEp and PT which has been very helpful  How long have you had this problem? 4/2023    Have you had any recent imaging of this problem? Xrays/MRI/CT scans? Yes, xr of elbow     Have you had treatments for this problem in the past?  -Medications? no  -Physical therapy? Yes, currently in PT   -Injections? no  -Surgery? no    How severe is this problem today? 0-10 scale? 2    How severe has this problem been at WORST in the past? 0-10 scale? 7    What do you think caused this problem? Is going through a minor flare up, has overall been doing better with pt     Does this problem or its symptoms cause difficulty for you falling asleep or staying asleep? no    Anything else you want us to know about this problem? no          MEDICAL HISTORY  Patient Active Problem List   Diagnosis     Hypothyroidism     Contact dermatitis and other eczema, due to unspecified cause     Abnormal glandular Papanicolaou smear of cervix     Polyarteritis nodosa (H)     Bladder cancer (H)     CARDIOVASCULAR SCREENING; LDL GOAL LESS THAN 160     Recurrent pregnancy loss     DIANA (generalized anxiety disorder)     Mild intermittent asthma with acute exacerbation     Psoriasis     Lateral epicondylitis of left elbow     Left tennis elbow     Left elbow pain       Current Outpatient Medications   Medication Sig Dispense Refill     albuterol (PROAIR HFA/PROVENTIL HFA/VENTOLIN HFA) 108 (90 Base) MCG/ACT inhaler Inhale 2 puffs into the lungs every 6 hours as needed  for shortness of breath / dyspnea or wheezing 8 g 4     clobetasol (TEMOVATE) 0.05 % external ointment APPLY TWICE DAILY AS NEEDED FOR FLARES       fluticasone (FLOVENT DISKUS) 100 MCG/BLIST inhaler Inhale 1 puff into the lungs 2 times daily 60 each 3     ketoconazole (NIZORAL) 2 % external cream        levothyroxine (SYNTHROID/LEVOTHROID) 137 MCG tablet TAKE 1 TABLET(137 MCG) BY MOUTH DAILY 90 tablet 0     meloxicam (MOBIC) 15 MG tablet Take 1 tablet (15 mg) by mouth daily 30 tablet 0     methylPREDNISolone (MEDROL) 4 MG tablet therapy pack Follow Package Directions 21 tablet 0     Multiple Vitamins-Minerals (MULTIVITAMIN ADULT PO)        Omega-3 Fatty Acids (OMEGA-3 FISH OIL PO)        penicillin V (VEETID) 500 MG tablet Take 500 mg by mouth 2 times daily       phentermine (ADIPEX-P) 15 MG capsule Take 1 capsule (15 mg) by mouth every morning 30 capsule 1     Risankizumab-rzaa (SKYRIZI PEN SC)        sertraline (ZOLOFT) 50 MG tablet TAKE 1 TABLET(50 MG) BY MOUTH DAILY 90 tablet 1     vitamin D3 (CHOLECALCIFEROL) 2000 units tablet Take 1 tablet by mouth daily         Allergies   Allergen Reactions     No Known Drug Allergy        Family History   Problem Relation Age of Onset     Hypertension Mother      Diabetes Father         adult onset-using insulin     Chronic Obstructive Pulmonary Disease Father      C.A.D. Maternal Grandmother         CABG at 63     Alcohol/Drug Maternal Grandfather      C.A.D. Paternal Grandmother         MI--70's-80's     Respiratory Paternal Grandfather         asthma     Social History     Socioeconomic History     Marital status:      Number of children: 0     Years of education: 16   Occupational History     Occupation: marketing     Comment: [employer]   Tobacco Use     Smoking status: Never     Smokeless tobacco: Never   Substance and Sexual Activity     Alcohol use: Yes     Comment: 3-5/ week     Drug use: No     Sexual activity: Yes     Partners: Male     Birth  control/protection: Female Surgical   Other Topics Concern      Service No     Blood Transfusions No     Caffeine Concern No     Occupational Exposure No     Hobby Hazards No     Sleep Concern No     Stress Concern Yes     Comment: workplace     Weight Concern No     Special Diet No     Back Care No     Exercise No     Bike Helmet No     Seat Belt No     Self-Exams No     Parent/sibling w/ CABG, MI or angioplasty before 65F 55M? No   Social History Narrative    Social Documentation:        Balanced Diet: YES    Calcium intake: 2-3 serving of dairy per day plus a MVI    Caffeine: none per day    Exercise:  type of activity treadmill, walking, wt lifting with upper body;  3-4 times per week    Sunscreen: Yes    Seatbelts:  Yes    Self Breast Exam:  Yes    Self Testicular Exam: No - n/a    Physical/Emotional/Sexual Abuse: seeing pyschiatrist for depression- doing well on wellbutrin    Do you feel safe in your environment? Yes        Cholesterol screen up to date: No - last chol in 2002- no fasting test done- would like tested today    Eye Exam up to date: Yes    Dental Exam up to date: Yes    Pap smear up to date: Yes    Mammogram up to date: Does Not Apply    Dexa Scan up to date: Does Not Apply    Colonoscopy up to date: Does Not Apply    Immunizations up to date: Yes-Td in 1998- Hep b completed.      Glucose screen if over 40:  No - N/A--but would like to be tested due to father with DM                                   Additional medical/Social/Surgical histories reviewed in [facility] and updated as appropriate.     REVIEW OF SYSTEMS (10/9/2023)  10 point ROS of systems including Constitutional, Eyes, Respiratory, Cardiovascular, Gastroenterology, Genitourinary, Integumentary, Musculoskeletal, Psychiatric, Allergic/Immunologic were all negative except for pertinent positives noted in my HPI.     PHYSICAL EXAM  VSS  Vital Signs: There were no vitals taken for this visit. Patient declined being weighed. There is  no height or weight on file to calculate BMI.    General  - normal appearance, in no obvious distress  HEENT  - conjunctivae not injected, moist mucous membranes, normocephalic/atraumatic head, ears normal appearance, no lesions, mouth normal appearance, no scars, normal dentition and teeth present  CV  - normal radial pulse  Pulm  - normal respiratory pattern, non-labored  Musculoskeletal - left elbow  - inspection: normal joint alignment, no obvious deformity, mild soft tissue swelling laterally  - palpation: tender at the origin of the common extensor tendon  - ROM:  160 deg flexion   0 deg extension   90 deg pronation   90 deg supination  - strength: 5/5 wrist extension with elbow flexed, 5/5 with elbow extended, painful resisted extension of long finger with elbow flexed, worse with extension, 5/5  strength  - special tests:  (-) varus  (-) valgus  (-) Tinel's  Neuro  - no sensory or motor deficit, grossly normal coordination, normal muscle tone  Skin  - no ecchymosis, erythema, warmth, or induration, no obvious rash  Psych  - interactive, appropriate, normal mood and affect   ASSESSMENT & PLAN  47 yo female with left elbow lateral epicondylitis, improved, not resolved    I independently reviewed the following imaging studies:  Left elbow xray: normal  Discussed consider MRI and PRP if not improving over the next 2 months  Ordered medrol pack   Cont. HEP and PT  [facility]  Patient HAS  completed physical therapy for 4-6 weeks  Patient has been doing home exercise physical therapy program for this problem      Appropriate PPE was utilized for prevention of spread of Covid-19.  Ravindra Joseph MD, [facility]        Again, thank you for allowing me to participate in the care of your patient.        Sincerely,        Ravindra Joseph MD

## 2023-10-09 NOTE — PROGRESS NOTES
HISTORY OF PRESENT ILLNESS  Ms. Parada is a pleasant 48 year old year old female who presents to clinic today with the following:  What problem are you here for? Left elbow pain   Improved overall, not resolved  Has been doing HEp and PT which has been very helpful  How long have you had this problem? 4/2023    Have you had any recent imaging of this problem? Xrays/MRI/CT scans? Yes, xr of elbow     Have you had treatments for this problem in the past?  -Medications? no  -Physical therapy? Yes, currently in PT   -Injections? no  -Surgery? no    How severe is this problem today? 0-10 scale? 2    How severe has this problem been at WORST in the past? 0-10 scale? 7    What do you think caused this problem? Is going through a minor flare up, has overall been doing better with pt     Does this problem or its symptoms cause difficulty for you falling asleep or staying asleep? no    Anything else you want us to know about this problem? no          MEDICAL HISTORY  Patient Active Problem List   Diagnosis    Hypothyroidism    Contact dermatitis and other eczema, due to unspecified cause    Abnormal glandular Papanicolaou smear of cervix    Polyarteritis nodosa (H)    Bladder cancer (H)    CARDIOVASCULAR SCREENING; LDL GOAL LESS THAN 160    Recurrent pregnancy loss    DIANA (generalized anxiety disorder)    Mild intermittent asthma with acute exacerbation    Psoriasis    Lateral epicondylitis of left elbow    Left tennis elbow    Left elbow pain       Current Outpatient Medications   Medication Sig Dispense Refill    albuterol (PROAIR HFA/PROVENTIL HFA/VENTOLIN HFA) 108 (90 Base) MCG/ACT inhaler Inhale 2 puffs into the lungs every 6 hours as needed for shortness of breath / dyspnea or wheezing 8 g 4    clobetasol (TEMOVATE) 0.05 % external ointment APPLY TWICE DAILY AS NEEDED FOR FLARES      fluticasone (FLOVENT DISKUS) 100 MCG/BLIST inhaler Inhale 1 puff into the lungs 2 times daily 60 each 3    ketoconazole (NIZORAL) 2 %  external cream       levothyroxine (SYNTHROID/LEVOTHROID) 137 MCG tablet TAKE 1 TABLET(137 MCG) BY MOUTH DAILY 90 tablet 0    meloxicam (MOBIC) 15 MG tablet Take 1 tablet (15 mg) by mouth daily 30 tablet 0    methylPREDNISolone (MEDROL) 4 MG tablet therapy pack Follow Package Directions 21 tablet 0    Multiple Vitamins-Minerals (MULTIVITAMIN ADULT PO)       Omega-3 Fatty Acids (OMEGA-3 FISH OIL PO)       penicillin V (VEETID) 500 MG tablet Take 500 mg by mouth 2 times daily      phentermine (ADIPEX-P) 15 MG capsule Take 1 capsule (15 mg) by mouth every morning 30 capsule 1    Risankizumab-rzaa (SKYRIZI PEN SC)       sertraline (ZOLOFT) 50 MG tablet TAKE 1 TABLET(50 MG) BY MOUTH DAILY 90 tablet 1    vitamin D3 (CHOLECALCIFEROL) 2000 units tablet Take 1 tablet by mouth daily         Allergies   Allergen Reactions    No Known Drug Allergy        Family History   Problem Relation Age of Onset    Hypertension Mother     Diabetes Father         adult onset-using insulin    Chronic Obstructive Pulmonary Disease Father     CONRADO.AKB. Maternal Grandmother         CABG at 63    Alcohol/Drug Maternal Grandfather     MONIKA. Paternal Grandmother         MI--70's-80's    Respiratory Paternal Grandfather         asthma     Social History     Socioeconomic History    Marital status:     Number of children: 0    Years of education: 16   Occupational History    Occupation: marketing     Comment: [employer]   Tobacco Use    Smoking status: Never    Smokeless tobacco: Never   Substance and Sexual Activity    Alcohol use: Yes     Comment: 3-5/ week    Drug use: No    Sexual activity: Yes     Partners: Male     Birth control/protection: Female Surgical   Other Topics Concern     Service No    Blood Transfusions No    Caffeine Concern No    Occupational Exposure No    Hobby Hazards No    Sleep Concern No    Stress Concern Yes     Comment: workplace    Weight Concern No    Special Diet No    Back Care No    Exercise  No    Bike Helmet No    Seat Belt No    Self-Exams No    Parent/sibling w/ CABG, MI or angioplasty before 65F 55M? No   Social History Narrative    Social Documentation:        Balanced Diet: YES    Calcium intake: 2-3 serving of dairy per day plus a MVI    Caffeine: none per day    Exercise:  type of activity treadmill, walking, wt lifting with upper body;  3-4 times per week    Sunscreen: Yes    Seatbelts:  Yes    Self Breast Exam:  Yes    Self Testicular Exam: No - n/a    Physical/Emotional/Sexual Abuse: seeing pyschiatrist for depression- doing well on wellbutrin    Do you feel safe in your environment? Yes        Cholesterol screen up to date: No - last chol in 2002- no fasting test done- would like tested today    Eye Exam up to date: Yes    Dental Exam up to date: Yes    Pap smear up to date: Yes    Mammogram up to date: Does Not Apply    Dexa Scan up to date: Does Not Apply    Colonoscopy up to date: Does Not Apply    Immunizations up to date: Yes-Td in 1998- Hep b completed.      Glucose screen if over 40:  No - N/A--but would like to be tested due to father with DM                                   Additional medical/Social/Surgical histories reviewed in [facility] and updated as appropriate.     REVIEW OF SYSTEMS (10/9/2023)  10 point ROS of systems including Constitutional, Eyes, Respiratory, Cardiovascular, Gastroenterology, Genitourinary, Integumentary, Musculoskeletal, Psychiatric, Allergic/Immunologic were all negative except for pertinent positives noted in my HPI.     PHYSICAL EXAM  VSS  Vital Signs: There were no vitals taken for this visit. Patient declined being weighed. There is no height or weight on file to calculate BMI.    General  - normal appearance, in no obvious distress  HEENT  - conjunctivae not injected, moist mucous membranes, normocephalic/atraumatic head, ears normal appearance, no lesions, mouth normal appearance, no scars, normal dentition and teeth present  CV  - normal radial  pulse  Pulm  - normal respiratory pattern, non-labored  Musculoskeletal - left elbow  - inspection: normal joint alignment, no obvious deformity, mild soft tissue swelling laterally  - palpation: tender at the origin of the common extensor tendon  - ROM:  160 deg flexion   0 deg extension   90 deg pronation   90 deg supination  - strength: 5/5 wrist extension with elbow flexed, 5/5 with elbow extended, painful resisted extension of long finger with elbow flexed, worse with extension, 5/5  strength  - special tests:  (-) varus  (-) valgus  (-) Tinel's  Neuro  - no sensory or motor deficit, grossly normal coordination, normal muscle tone  Skin  - no ecchymosis, erythema, warmth, or induration, no obvious rash  Psych  - interactive, appropriate, normal mood and affect   ASSESSMENT & PLAN  49 yo female with left elbow lateral epicondylitis, improved, not resolved    I independently reviewed the following imaging studies:  Left elbow xray: normal  Discussed consider MRI and PRP if not improving over the next 2 months  Ordered medrol pack   Cont. HEP and PT  [facility]  Patient HAS  completed physical therapy for 4-6 weeks  Patient has been doing home exercise physical therapy program for this problem      Appropriate PPE was utilized for prevention of spread of Covid-19.  Ravindra Joseph MD, CAQSM

## 2023-10-10 RX ORDER — PHENTERMINE HYDROCHLORIDE 30 MG/1
30 CAPSULE ORAL EVERY MORNING
Qty: 30 CAPSULE | Refills: 0 | Status: SHIPPED | OUTPATIENT
Start: 2023-10-10 | End: 2023-12-13

## 2023-10-10 NOTE — TELEPHONE ENCOUNTER
Patient states 15mg dose was effective, but feels the improvements may have reached a plateau   Will try 30mg and contact [facility] with new or worsening symptoms  Safia MCARTHUR RN

## 2023-10-11 ENCOUNTER — THERAPY VISIT (OUTPATIENT)
Dept: OCCUPATIONAL THERAPY | Facility: CLINIC | Age: 48
End: 2023-10-11
Payer: COMMERCIAL

## 2023-10-11 DIAGNOSIS — M77.12 LEFT TENNIS ELBOW: ICD-10-CM

## 2023-10-11 DIAGNOSIS — M77.12 LATERAL EPICONDYLITIS OF LEFT ELBOW: Primary | ICD-10-CM

## 2023-10-11 DIAGNOSIS — M25.522 LEFT ELBOW PAIN: ICD-10-CM

## 2023-10-11 PROCEDURE — 97110 THERAPEUTIC EXERCISES: CPT | Mod: GO

## 2023-10-11 NOTE — PROGRESS NOTES
"   10/11/23 0500   Appointment Info   Treating Provider Georgia Ellis, OTR/L   Total/Authorized Visits 8 (POC)   Visits Used 7   Medical Diagnosis L LEP   OT Tx Diagnosis L LEP   Progress Note/Certification   Onset of Illness/Injury or Date of Surgery 04/01/23   Therapy Frequency 1x every 3 weeks   Predicted Duration 6 weeks   Progress Note Due Date 10/04/23   Progress Note Completed Date 08/23/23   Goals   OT Goals 1   OT Goal 1   Goal Identifier Lifting   Goal Description Patient will report no difficulty when lifting a shopping bag   Rationale In order to safely and appropriately apply compensatory strategies with ADL/IADL performance   Goal Progress Mild Difficulty   Target Date 10/04/23   Subjective Report   Subjective Report \"It's doing okay. It flared up a couple of weeks ago. I saw the doctor on Monday. He wants me to start another round of Prednisone. I haven't been doing the exercises as good as I was before. I started to feel better, so I didn't think I needed to. The pain that I am at now is much better than it was before when we started.\"   Objective Measures   Objective Measures Objective Measure 1;Objective Measure 2;Objective Measure 3;Objective Measure 4;Objective Measure 5   Objective Measure 1   Objective Measure Pain Scale   Details 2-3/10 pain   Objective Measure 2   Objective Measure Resisted Testing  Pain Report:  - none    + mild    ++ moderate    +++ severe   Details Elbow Flexion: 5/5+ Wrist Ext with UD 5/5-, Wrist Flex with RD, Elbow at side:  5/5-, Long Finger Test: 5/5+   Objective Measure 3   Objective Measure RNT: Radial Neurodynamic Test (based on MACIEL Tijerina's ULNT)   Details Between 3-4/5   Objective Measure 4   Objective Measure Strength   (Measured in pounds)  Pain Report: - none  + mild    ++ moderate    +++ severe   Details L: 56lbs Elbow Ext: 58lbs   Objective Measure 5   Objective Measure Palpation  Pain Report:  - none    + mild    ++ moderate    +++ severe   Details " Anconeus: + ECU at LEP: + PIN Site: +   Therapeutic Procedure/Exercise   Therapeutic Procedure: strength, endurance, ROM, flexibillity minutes (58821) 12   Ther Proc 1 DN   Ther Proc 1 - Details Please see the objective data gathered above for today's DN   PTRx Ther Proc 1 Forearm Passive Range of Motion Extensor Stretch   PTRx Ther Proc 1 - Details Reviewed, HEP   PTRx Ther Proc 2 Wrist Isotonic Strengthening Radial Deviation   PTRx Ther Proc 2 - Details Reviewed, HEP   Therapeutic Activity   Therapeutic Activity Minutes (38294) 2   PTRx Ther Act 1 TENNIS ELBOW PREVENTION   PTRx Ther Act 1 - Details Reviewed, HEP   PTRx Ther Act 2 Warmth   PTRx Ther Act 2 - Details Reviewed, HEP   PTRx Ther Act 3 Ball Massage to Extensors   PTRx Ther Act 3 - Details Reviewed, HEP   PTRx Ther Act 4 Friction Massage   PTRx Ther Act 4 - Details Reviewed, HEP   PTRx Ther Act 5 Icing   PTRx Ther Act 5 - Details Reviewed, HEP   Education   Learner/Method Patient;Pictures/Video;Demonstration   Plan   Home program See PTRX   Plan for next session This is their last therapy appt.   Total Session Time   Timed Code Treatment Minutes 14   Total Treatment Time (sum of timed and untimed services) 14       DISCHARGE  Reason for Discharge: Patient no longer requires therapy to meet their goals.     Discharge Plan: Patient to continue home program.    Referring Provider:  Ravindra Joseph

## 2023-11-01 ENCOUNTER — OFFICE VISIT (OUTPATIENT)
Dept: FAMILY MEDICINE | Facility: CLINIC | Age: 48
End: 2023-11-01
Payer: COMMERCIAL

## 2023-11-01 ENCOUNTER — MYC MEDICAL ADVICE (OUTPATIENT)
Dept: FAMILY MEDICINE | Facility: CLINIC | Age: 48
End: 2023-11-01

## 2023-11-01 VITALS
OXYGEN SATURATION: 100 % | TEMPERATURE: 97.9 F | RESPIRATION RATE: 16 BRPM | WEIGHT: 175 LBS | BODY MASS INDEX: 30.06 KG/M2 | SYSTOLIC BLOOD PRESSURE: 118 MMHG | HEART RATE: 66 BPM | DIASTOLIC BLOOD PRESSURE: 82 MMHG

## 2023-11-01 DIAGNOSIS — R39.9 URINARY SYMPTOM OR SIGN: ICD-10-CM

## 2023-11-01 DIAGNOSIS — R63.5 ABNORMAL WEIGHT GAIN: Primary | ICD-10-CM

## 2023-11-01 DIAGNOSIS — N30.00 ACUTE CYSTITIS WITHOUT HEMATURIA: Primary | ICD-10-CM

## 2023-11-01 DIAGNOSIS — E03.9 HYPOTHYROIDISM, UNSPECIFIED TYPE: ICD-10-CM

## 2023-11-01 LAB
ALBUMIN UR-MCNC: NEGATIVE MG/DL
APPEARANCE UR: CLEAR
BACTERIA #/AREA URNS HPF: ABNORMAL /HPF
BILIRUB UR QL STRIP: NEGATIVE
COLOR UR AUTO: YELLOW
CORTIS SERPL-MCNC: 13.5 UG/DL
FSH SERPL IRP2-ACNC: 58.8 MIU/ML
GLUCOSE UR STRIP-MCNC: NEGATIVE MG/DL
HBA1C MFR BLD: 5.5 % (ref 0–5.6)
HGB UR QL STRIP: NEGATIVE
KETONES UR STRIP-MCNC: NEGATIVE MG/DL
LEUKOCYTE ESTERASE UR QL STRIP: ABNORMAL
NITRATE UR QL: NEGATIVE
PH UR STRIP: 7 [PH] (ref 5–7)
RBC #/AREA URNS AUTO: ABNORMAL /HPF
SP GR UR STRIP: 1.01 (ref 1–1.03)
SQUAMOUS #/AREA URNS AUTO: ABNORMAL /LPF
T4 FREE SERPL-MCNC: 1.65 NG/DL (ref 0.9–1.7)
TSH SERPL DL<=0.005 MIU/L-ACNC: 0.16 UIU/ML (ref 0.3–4.2)
UROBILINOGEN UR STRIP-ACNC: 0.2 E.U./DL
WBC #/AREA URNS AUTO: ABNORMAL /HPF

## 2023-11-01 PROCEDURE — 83001 ASSAY OF GONADOTROPIN (FSH): CPT | Performed by: FAMILY MEDICINE

## 2023-11-01 PROCEDURE — 82533 TOTAL CORTISOL: CPT | Performed by: FAMILY MEDICINE

## 2023-11-01 PROCEDURE — 81001 URINALYSIS AUTO W/SCOPE: CPT | Performed by: FAMILY MEDICINE

## 2023-11-01 PROCEDURE — 87186 SC STD MICRODIL/AGAR DIL: CPT | Performed by: FAMILY MEDICINE

## 2023-11-01 PROCEDURE — 36415 COLL VENOUS BLD VENIPUNCTURE: CPT | Performed by: FAMILY MEDICINE

## 2023-11-01 PROCEDURE — 99214 OFFICE O/P EST MOD 30 MIN: CPT | Performed by: FAMILY MEDICINE

## 2023-11-01 PROCEDURE — 83036 HEMOGLOBIN GLYCOSYLATED A1C: CPT | Performed by: FAMILY MEDICINE

## 2023-11-01 PROCEDURE — 87086 URINE CULTURE/COLONY COUNT: CPT | Performed by: FAMILY MEDICINE

## 2023-11-01 PROCEDURE — 84439 ASSAY OF FREE THYROXINE: CPT | Performed by: FAMILY MEDICINE

## 2023-11-01 PROCEDURE — 84443 ASSAY THYROID STIM HORMONE: CPT | Performed by: FAMILY MEDICINE

## 2023-11-01 RX ORDER — PHENTERMINE HYDROCHLORIDE 15 MG/1
15 CAPSULE ORAL EVERY MORNING
Qty: 30 CAPSULE | Refills: 2 | Status: SHIPPED | OUTPATIENT
Start: 2023-11-01 | End: 2023-12-13

## 2023-11-01 RX ORDER — TOPIRAMATE 25 MG/1
TABLET, FILM COATED ORAL
Qty: 240 TABLET | Refills: 0 | Status: SHIPPED | OUTPATIENT
Start: 2023-11-01 | End: 2023-12-13

## 2023-11-01 RX ORDER — CLINDAMYCIN PHOSPHATE 10 MG/G
GEL TOPICAL 2 TIMES DAILY
COMMUNITY
Start: 2023-10-14

## 2023-11-01 ASSESSMENT — ANXIETY QUESTIONNAIRES
IF YOU CHECKED OFF ANY PROBLEMS ON THIS QUESTIONNAIRE, HOW DIFFICULT HAVE THESE PROBLEMS MADE IT FOR YOU TO DO YOUR WORK, TAKE CARE OF THINGS AT HOME, OR GET ALONG WITH OTHER PEOPLE: SOMEWHAT DIFFICULT
4. TROUBLE RELAXING: NOT AT ALL
6. BECOMING EASILY ANNOYED OR IRRITABLE: SEVERAL DAYS
GAD7 TOTAL SCORE: 5
1. FEELING NERVOUS, ANXIOUS, OR ON EDGE: SEVERAL DAYS
7. FEELING AFRAID AS IF SOMETHING AWFUL MIGHT HAPPEN: SEVERAL DAYS
2. NOT BEING ABLE TO STOP OR CONTROL WORRYING: SEVERAL DAYS
GAD7 TOTAL SCORE: 5
8. IF YOU CHECKED OFF ANY PROBLEMS, HOW DIFFICULT HAVE THESE MADE IT FOR YOU TO DO YOUR WORK, TAKE CARE OF THINGS AT HOME, OR GET ALONG WITH OTHER PEOPLE?: SOMEWHAT DIFFICULT
3. WORRYING TOO MUCH ABOUT DIFFERENT THINGS: SEVERAL DAYS
7. FEELING AFRAID AS IF SOMETHING AWFUL MIGHT HAPPEN: SEVERAL DAYS
GAD7 TOTAL SCORE: 5
5. BEING SO RESTLESS THAT IT IS HARD TO SIT STILL: NOT AT ALL

## 2023-11-01 ASSESSMENT — ASTHMA QUESTIONNAIRES
QUESTION_1 LAST FOUR WEEKS HOW MUCH OF THE TIME DID YOUR ASTHMA KEEP YOU FROM GETTING AS MUCH DONE AT WORK, SCHOOL OR AT HOME: NONE OF THE TIME
ACT_TOTALSCORE: 25
QUESTION_2 LAST FOUR WEEKS HOW OFTEN HAVE YOU HAD SHORTNESS OF BREATH: NOT AT ALL
QUESTION_3 LAST FOUR WEEKS HOW OFTEN DID YOUR ASTHMA SYMPTOMS (WHEEZING, COUGHING, SHORTNESS OF BREATH, CHEST TIGHTNESS OR PAIN) WAKE YOU UP AT NIGHT OR EARLIER THAN USUAL IN THE MORNING: NOT AT ALL
ACT_TOTALSCORE: 25
QUESTION_4 LAST FOUR WEEKS HOW OFTEN HAVE YOU USED YOUR RESCUE INHALER OR NEBULIZER MEDICATION (SUCH AS ALBUTEROL): NOT AT ALL
QUESTION_5 LAST FOUR WEEKS HOW WOULD YOU RATE YOUR ASTHMA CONTROL: COMPLETELY CONTROLLED

## 2023-11-01 ASSESSMENT — PATIENT HEALTH QUESTIONNAIRE - PHQ9
SUM OF ALL RESPONSES TO PHQ QUESTIONS 1-9: 5
10. IF YOU CHECKED OFF ANY PROBLEMS, HOW DIFFICULT HAVE THESE PROBLEMS MADE IT FOR YOU TO DO YOUR WORK, TAKE CARE OF THINGS AT HOME, OR GET ALONG WITH OTHER PEOPLE: SOMEWHAT DIFFICULT
SUM OF ALL RESPONSES TO PHQ QUESTIONS 1-9: 5

## 2023-11-01 NOTE — PROGRESS NOTES
"  Assessment & Plan     Abnormal weight gain  Weight gain -   Pt started phentermine 15mg and had good initial response  Discussed options today   Will add topamax with goal to titrate dose to 50mg BID   Next refill can switch to 50mg BID if tolerating  Other option would be naltrexone/bupropion combo  - Hemoglobin A1c; Future  - topiramate (TOPAMAX) 25 MG tablet; Start 25mg daily for 1 week then increase to 25mg BID for 1 week, then increase to 50mg at bedtime and 25mg in AM for one week, then 50mg BID  - phentermine (ADIPEX-P) 15 MG capsule; Take 1 capsule (15 mg) by mouth every morning  - Cortisol  - Follicle stimulating hormone  - Hemoglobin A1c    Hypothyroidism, unspecified type  TSH due for recheck   - TSH with free T4 reflex    Urinary symptom or sign  Urinary symptoms  Will check culture to look since many bacteria  - UA with Microscopic reflex to Culture - lab collect; Future  - UA with Microscopic reflex to Culture - lab collect  - UA Microscopic with Reflex to Culture             BMI:   Estimated body mass index is 30.06 kg/m  as calculated from the following:    Height as of 5/19/23: 1.625 m (5' 3.98\").    Weight as of this encounter: 79.4 kg (175 lb).   Weight management plan: see above discussion         Michelle [facility]dwell [facility] is a 48 year old, presenting for the following health issues:  No chief complaint on file.        11/1/2023    10:15 AM   Additional Questions   Roomed by Libia ARCINIEGA MA   Accompanied by self         11/1/2023    10:15 AM   Patient Reported Additional Medications   Patient reports taking the following new medications none       History of Present Illness       Hypothyroidism:     Since last visit, patient describes the following symptoms::  Weight loss    Weight loss::  11-15 lbs.    She eats 2-3 servings of fruits and vegetables daily.She consumes 1 sweetened beverage(s) daily.She exercises with enough effort to increase her heart " rate 30 to 60 minutes per day.  She exercises with enough effort to increase her heart rate 4 days per week.   She is taking medications regularly.       Started phentermine -   15mg - lost weight for 3 months  - weight was at 188-190  Weight got down to 173    Seemed to hit plateau and dose increased to 30mg and not changes    Not tracking calories   Exercising 3-4 times per week          Review of Systems   Constitutional, HEENT, cardiovascular, pulmonary, gi and gu systems are negative, except as otherwise noted.      Objective    /82   Pulse 66   Temp 97.9  F (36.6  C) (Temporal)   Resp 16   Wt 79.4 kg (175 lb)   LMP 05/26/2023   SpO2 100%   BMI 30.06 kg/m    Body mass index is 30.06 kg/m .  Physical Exam   GENERAL: healthy, alert and no distress    Results for orders placed or performed in visit on 11/01/23 (from the past 24 hour(s))   UA with Microscopic reflex to Culture - lab collect    Specimen: Urine, Midstream   Result Value Ref Range    Color Urine Yellow Colorless, Straw, Light Yellow, Yellow    Appearance Urine Clear Clear    Glucose Urine Negative Negative mg/dL    Bilirubin Urine Negative Negative    Ketones Urine Negative Negative mg/dL    Specific Gravity Urine 1.015 1.003 - 1.035    Blood Urine Negative Negative    pH Urine 7.0 5.0 - 7.0    Protein Albumin Urine Negative Negative mg/dL    Urobilinogen Urine 0.2 0.2, 1.0 E.U./dL    Nitrite Urine Negative Negative    Leukocyte Esterase Urine Small (A) Negative   UA Microscopic with Reflex to Culture   Result Value Ref Range    Bacteria Urine Many (A) None Seen /HPF    RBC Urine 0-2 0-2 /HPF /HPF    WBC Urine 0-5 0-5 /HPF /HPF    Squamous Epithelials Urine Few (A) None Seen /LPF    Narrative    Urine Culture not indicated   Hemoglobin A1c   Result Value Ref Range    Hemoglobin A1C 5.5 0.0 - 5.6 %

## 2023-11-02 LAB — BACTERIA UR CULT: ABNORMAL

## 2023-11-02 NOTE — TELEPHONE ENCOUNTER
DE (DOD),   Patient calling  Regarding urinalysis yesterday  Patient experiencing urine odor, so she asked to run urinalysis yesterday  Denies urgency, burning, pain  Hoping for antibiotic prior to traveling today, if recommended  Please advise in PCP absence  Thanks,  Safia MCARTHUR RN

## 2023-11-03 RX ORDER — SULFAMETHOXAZOLE/TRIMETHOPRIM 800-160 MG
1 TABLET ORAL 2 TIMES DAILY
Qty: 6 TABLET | Refills: 0 | Status: SHIPPED | OUTPATIENT
Start: 2023-11-03 | End: 2023-12-13

## 2023-11-03 NOTE — RESULT ENCOUNTER NOTE
Dear Audra,   Your test results are all back -   FSH - Menopause hormone is elevated.   Let us know if you have any questions.  -Michelle Caldwell, DO

## 2023-11-03 NOTE — RESULT ENCOUNTER NOTE
Dear Audra,   Your test results are all back -   Now the TSH is a little too low meaning you are getting too much.  Lets have you keep current dose but only take 1/2 tablet on Sundays. Recheck labs in 6 weeks.  Let us know if you have any questions.  -Michelle Caldwell, DO

## 2023-11-06 NOTE — TELEPHONE ENCOUNTER
JS (DOD),  Please see below [employer] message and advise.  I assume not both?  Thanks,  Apurva BRIGGS RN

## 2023-11-06 NOTE — TELEPHONE ENCOUNTER
Looking at cx results, pcn should cover the URINARY TRACT INFECTION as well.    Jacobo Carroll PA-C

## 2023-11-16 ENCOUNTER — MYC MEDICAL ADVICE (OUTPATIENT)
Dept: FAMILY MEDICINE | Facility: CLINIC | Age: 48
End: 2023-11-16
Payer: COMMERCIAL

## 2023-11-20 ENCOUNTER — PATIENT OUTREACH (OUTPATIENT)
Dept: CARE COORDINATION | Facility: CLINIC | Age: 48
End: 2023-11-20
Payer: COMMERCIAL

## 2023-11-29 ENCOUNTER — MYC MEDICAL ADVICE (OUTPATIENT)
Dept: FAMILY MEDICINE | Facility: CLINIC | Age: 48
End: 2023-11-29
Payer: COMMERCIAL

## 2023-11-30 NOTE — TELEPHONE ENCOUNTER
PN,  Please see below [employer] message and advise.  Pulled records on care everywhere  Could scheduled hospital follow up 12/13/23 - first available  Or could schedule with covering provider?  Thanks,  Safia MCARTHUR RN

## 2023-12-05 DIAGNOSIS — E03.9 HYPOTHYROIDISM, UNSPECIFIED TYPE: ICD-10-CM

## 2023-12-05 RX ORDER — LEVOTHYROXINE SODIUM 137 UG/1
137 TABLET ORAL DAILY
Qty: 90 TABLET | Refills: 0 | Status: SHIPPED | OUTPATIENT
Start: 2023-12-05 | End: 2024-02-16

## 2023-12-05 NOTE — RESULT ENCOUNTER NOTE
Dear Audra  Your inflammatory markers (CRP and sed rate) were normal.  Your thyroid function was normal.   Your electrolytes, blood sugar, kidney function and liver function were normal.    Your ddimer (looking for a blood clot) was negative.  Your blood counts and hemoglobin were normal.   You do not have diabetes.  Not all of your labs are back yet.  Please call or MyChart my office with any questions or concerns.   Jenny Soliman, SAYRA         i   PA initiated already

## 2023-12-13 ENCOUNTER — OFFICE VISIT (OUTPATIENT)
Dept: FAMILY MEDICINE | Facility: CLINIC | Age: 48
End: 2023-12-13
Payer: COMMERCIAL

## 2023-12-13 VITALS
HEIGHT: 64 IN | WEIGHT: 169.5 LBS | BODY MASS INDEX: 28.94 KG/M2 | SYSTOLIC BLOOD PRESSURE: 115 MMHG | HEART RATE: 77 BPM | DIASTOLIC BLOOD PRESSURE: 75 MMHG | RESPIRATION RATE: 18 BRPM | TEMPERATURE: 97.5 F | OXYGEN SATURATION: 97 %

## 2023-12-13 DIAGNOSIS — K12.30 STOMATITIS AND MUCOSITIS: Primary | ICD-10-CM

## 2023-12-13 DIAGNOSIS — M30.0 POLYARTERITIS NODOSA (H): ICD-10-CM

## 2023-12-13 DIAGNOSIS — L30.8 DESQUAMATIVE DERMATITIS: ICD-10-CM

## 2023-12-13 DIAGNOSIS — K12.1 STOMATITIS AND MUCOSITIS: Primary | ICD-10-CM

## 2023-12-13 DIAGNOSIS — R76.8 POSITIVE ANA (ANTINUCLEAR ANTIBODY): ICD-10-CM

## 2023-12-13 LAB
ALBUMIN UR-MCNC: NEGATIVE MG/DL
APPEARANCE UR: CLEAR
BILIRUB UR QL STRIP: NEGATIVE
COLOR UR AUTO: YELLOW
ERYTHROCYTE [SEDIMENTATION RATE] IN BLOOD BY WESTERGREN METHOD: 19 MM/HR (ref 0–30)
GLUCOSE UR STRIP-MCNC: NEGATIVE MG/DL
HGB UR QL STRIP: ABNORMAL
KETONES UR STRIP-MCNC: NEGATIVE MG/DL
LEUKOCYTE ESTERASE UR QL STRIP: ABNORMAL
NITRATE UR QL: NEGATIVE
PH UR STRIP: 7 [PH] (ref 5–7)
RBC #/AREA URNS AUTO: NORMAL /HPF
SP GR UR STRIP: 1.02 (ref 1–1.03)
UROBILINOGEN UR STRIP-ACNC: 0.2 E.U./DL
WBC #/AREA URNS AUTO: NORMAL /HPF

## 2023-12-13 PROCEDURE — 86225 DNA ANTIBODY NATIVE: CPT | Performed by: FAMILY MEDICINE

## 2023-12-13 PROCEDURE — 36415 COLL VENOUS BLD VENIPUNCTURE: CPT | Performed by: FAMILY MEDICINE

## 2023-12-13 PROCEDURE — 85730 THROMBOPLASTIN TIME PARTIAL: CPT | Performed by: FAMILY MEDICINE

## 2023-12-13 PROCEDURE — 81001 URINALYSIS AUTO W/SCOPE: CPT | Performed by: FAMILY MEDICINE

## 2023-12-13 PROCEDURE — 86140 C-REACTIVE PROTEIN: CPT | Performed by: FAMILY MEDICINE

## 2023-12-13 PROCEDURE — 99214 OFFICE O/P EST MOD 30 MIN: CPT | Performed by: FAMILY MEDICINE

## 2023-12-13 PROCEDURE — 85613 RUSSELL VIPER VENOM DILUTED: CPT | Performed by: FAMILY MEDICINE

## 2023-12-13 PROCEDURE — 86160 COMPLEMENT ANTIGEN: CPT | Performed by: FAMILY MEDICINE

## 2023-12-13 PROCEDURE — 99000 SPECIMEN HANDLING OFFICE-LAB: CPT | Performed by: FAMILY MEDICINE

## 2023-12-13 PROCEDURE — 85390 FIBRINOLYSINS SCREEN I&R: CPT | Performed by: PATHOLOGY

## 2023-12-13 PROCEDURE — 86780 TREPONEMA PALLIDUM: CPT | Performed by: FAMILY MEDICINE

## 2023-12-13 PROCEDURE — 85652 RBC SED RATE AUTOMATED: CPT | Performed by: FAMILY MEDICINE

## 2023-12-13 PROCEDURE — 86162 COMPLEMENT TOTAL (CH50): CPT | Mod: 90 | Performed by: FAMILY MEDICINE

## 2023-12-13 ASSESSMENT — ANXIETY QUESTIONNAIRES
7. FEELING AFRAID AS IF SOMETHING AWFUL MIGHT HAPPEN: SEVERAL DAYS
1. FEELING NERVOUS, ANXIOUS, OR ON EDGE: SEVERAL DAYS
IF YOU CHECKED OFF ANY PROBLEMS ON THIS QUESTIONNAIRE, HOW DIFFICULT HAVE THESE PROBLEMS MADE IT FOR YOU TO DO YOUR WORK, TAKE CARE OF THINGS AT HOME, OR GET ALONG WITH OTHER PEOPLE: SOMEWHAT DIFFICULT
6. BECOMING EASILY ANNOYED OR IRRITABLE: MORE THAN HALF THE DAYS
2. NOT BEING ABLE TO STOP OR CONTROL WORRYING: SEVERAL DAYS
4. TROUBLE RELAXING: SEVERAL DAYS
5. BEING SO RESTLESS THAT IT IS HARD TO SIT STILL: NOT AT ALL
3. WORRYING TOO MUCH ABOUT DIFFERENT THINGS: SEVERAL DAYS
GAD7 TOTAL SCORE: 7
GAD7 TOTAL SCORE: 7

## 2023-12-13 ASSESSMENT — PAIN SCALES - GENERAL: PAINLEVEL: NO PAIN (0)

## 2023-12-13 NOTE — PROGRESS NOTES
Assessment & Plan     Stomatitis and mucositis  Pt with hospitalization for severe mucositis and desquamative dermatitis - see [facility] records for details.  Reviewed Care Everywhere including labs    Patient has long history of autoimmune issues starting years ago when she was diagnosed with polyarteritis nodosa - treated at [place].    Over the past few years she has had two episodes of similar symptoms - the first was in 2019 when she had the desquamative hands and feet rash one month after treatment for strep pharyngitis (treated with amoxicillin).  Recently pt had multiple medications so unclear but she was treated with PCN for skin strep infection one month prior.  She also was started on topiramate and phentermine for weight loss.    Pt does have history of some joint pain and swelling last May but has not had musculoskeletal symptoms since that time.  She has a positive RICHARD 6 months ago.    Plan to check for inflammatory labs today   Would like to have rheumatology review her history and labs because of concern for underlying etiology vs possible reaction to drug?(PCN or topamax)   - ESR: Erythrocyte sedimentation rate; Future  - CRP, inflammation; Future  - Treponema Abs w Reflex to RPR and Titer; Future  - DNA double stranded antibodies; Future  - Complement C3; Future  - Complement Activity Total (CH50); Future  - Complement C4; Future  - Lupus Anticoagulant Panel; Future  - UA with Microscopic reflex to Culture - lab collect; Future  - Adult Rheumatology  Referral; Future  - ESR: Erythrocyte sedimentation rate  - CRP, inflammation  - Treponema Abs w Reflex to RPR and Titer  - DNA double stranded antibodies  - Complement C3  - Complement Activity Total (CH50)  - Complement C4  - Lupus Anticoagulant Panel  - UA with Microscopic reflex to Culture - lab collect  - UA Microscopic with Reflex to Culture    Desquamative dermatitis  As above   - ESR: Erythrocyte sedimentation rate; Future  - CRP,  inflammation; Future  - Treponema Abs w Reflex to RPR and Titer; Future  - DNA double stranded antibodies; Future  - Complement C3; Future  - Complement Activity Total (CH50); Future  - Complement C4; Future  - Lupus Anticoagulant Panel; Future  - UA with Microscopic reflex to Culture - lab collect; Future  - Adult Rheumatology  Referral; Future  - ESR: Erythrocyte sedimentation rate  - CRP, inflammation  - Treponema Abs w Reflex to RPR and Titer  - DNA double stranded antibodies  - Complement C3  - Complement Activity Total (CH50)  - Complement C4  - Lupus Anticoagulant Panel  - UA with Microscopic reflex to Culture - lab collect  - UA Microscopic with Reflex to Culture    Polyarteritis nodosa (H)  As above   - ESR: Erythrocyte sedimentation rate; Future  - CRP, inflammation; Future  - Treponema Abs w Reflex to RPR and Titer; Future  - DNA double stranded antibodies; Future  - Complement C3; Future  - Complement Activity Total (CH50); Future  - Complement C4; Future  - Lupus Anticoagulant Panel; Future  - UA with Microscopic reflex to Culture - lab collect; Future  - Adult Rheumatology  Referral; Future  - ESR: Erythrocyte sedimentation rate  - CRP, inflammation  - Treponema Abs w Reflex to RPR and Titer  - DNA double stranded antibodies  - Complement C3  - Complement Activity Total (CH50)  - Complement C4  - Lupus Anticoagulant Panel  - UA with Microscopic reflex to Culture - lab collect  - UA Microscopic with Reflex to Culture    Positive RICHARD (antinuclear antibody)   As above   - ESR: Erythrocyte sedimentation rate; Future  - CRP, inflammation; Future  - Treponema Abs w Reflex to RPR and Titer; Future  - DNA double stranded antibodies; Future  - Complement C3; Future  - Complement Activity Total (CH50); Future  - Complement C4; Future  - Lupus Anticoagulant Panel; Future  - UA with Microscopic reflex to Culture - lab collect; Future  - Adult Rheumatology  Referral; Future  - ESR:  Erythrocyte sedimentation rate  - CRP, inflammation  - Treponema Abs w Reflex to RPR and Titer  - DNA double stranded antibodies  - Complement C3  - Complement Activity Total (CH50)  - Complement C4  - Lupus Anticoagulant Panel  - UA with Microscopic reflex to Culture - lab collect  - UA Microscopic with Reflex to Culture      No LOS data to display   Time spent by me doing chart review, history and exam, documentation and further activities per the note     MED REC REQUIRED   Post Medication Reconciliation Status:  Discharge medications reconciled and changed, see notes/orders      Michelle Caldwell DO  [facility]    Lio Zhu is a 48 year old, presenting for the following health issues:  Hospital F/U      History of Present Illness       Reason for visit:  Hospital visit follow up  Symptom onset:  3-4 weeks ago    She eats 2-3 servings of fruits and vegetables daily.She consumes 1 sweetened beverage(s) daily.She exercises with enough effort to increase her heart rate 20 to 29 minutes per day.  She exercises with enough effort to increase her heart rate 3 or less days per week.   She is taking medications regularly.           Hospital Follow-up Visit:    Hospital/Nursing Home/ Rehab Facility:  [facility]  Date of Admission: 11/27 / 11/28  Date of Discharge: 11/29  Reason(s) for Admission: rash; mouth lesion; stomatitis    Was your hospitalization related to COVID-19? No   Problems taking medications regularly:  None -= on prednisone taper - will be off soon   Medication changes since discharge: see updated med list - tapering off prednisone so not on list   Problems adhering to non-medication therapy:  None    Summary of hospitalization:  CareEverywhere information obtained and reviewed  Diagnostic Tests/Treatments reviewed.  Follow up needed: lab follow-up   Other Healthcare Providers Involved in Patient s Care:         None  Update since discharge: improved.        Here for follow-up with hospitalization -   Went to [facility] for severe mouth sores and rash on ears, palms and feet      BRIEF SUMMARY OF HOSPITALIZATION:  Audra Parada  is a 48 y.o. female with past medical history of psoriasis, hypothyroidism admitted on 11/28/2023  with rash on her hands, soles, ear, chest as well as lips and mouth. Dermatology and ophthalmology consulted, suspected viral etiology to symptoms. On discharge, pt was vitally stable with significant reduction in symptoms.      HOSPITAL COURSE BY PROBLEM:  Maculopapular Mucocutaneous Rash involving the palms and soles.    Mild Conjunctivitis, unspecified   L sided blurry vision; resolved   Pt developed painful oral lesions 7 days PTA as well as a papular eruption on the hands, soles, chest, ears, and back. Pt did have some fatigue, sinusitis, and conjunctivitis, dysphagia in the days leading up to admission. Of note, she was started on numerous new medications in the past month, including Topiramate, Bactrim, Penicillin V, Augmentin. No clear drug culprit as there was not a clear association between start of a medication and symptom onset. Dermatology consult suspected erythema multiforme vs reactive infectious mucocutaneous eruption (RIME); low suspicion for SJS. Treated with prednisone 30 mg with significant improvement of symptoms (was to swallow, reduction in facial swelling, improving pain from oral lesions, decreased itchiness).  Vitals stable throughout admission. Labs wnl, only notable for elevated C-reactive protein. HIV negative.  RPR positive - likely a false positive. Symptomatology suspected to be a result of infectious/viral etiology.   - Continue prednisone with 3 week taper: 40 mg daily x7 days, 20 mg x7 days, 10 mg x7 days, then stop.   - Clobetasol ointment TID to lips   - Alternate dexamethasone oral solution swish and spit with viscous lidocaine  - Triamcinolone 0.1% ointment BID to chest and back  - Start lubricating eye  "ointment at night before bedtime & avoid contact lenses  - f/u derm outpatient in 4 weeks  - repeat RPR as outpatient       Leading up to the hospitalization patient had been on multiple medications - list below with dates started  Bactrim 11/6/23(only took one dose)  PCN prescribed same time for perinal strep infection - so switched 11/6/23 - finished week course  Topiramate and phentermine started 11/6/23  Got influenza and shingles vaccine 11/16/2023  Cipro drops and Augmentin on 11/25/23 but was already starting to have symptoms and was seen at  in [place]  Stopped meds wed prior to thanksgiving - topiramate and all other     Just prior to thanksgiving was starting to have sores in mouth and eye irritation, fatigue - at the time dx with sinus infections  Throat and ear were really swollen     Had trouble swallowing her antibiotic and progressively worsened  Went to ER 11/27/23 - thought related to medication  Told if worsening she was to go to [facility]     Rash - mouth, ears, lips, palms, soles and back     Currently on prednisone - on wean 40, 20 and 10mg       Plan of care communicated with patient                   Review of Systems   Constitutional, HEENT, cardiovascular, pulmonary, gi and gu systems are negative, except as otherwise noted.      Objective    /75   Pulse 77   Temp 97.5  F (36.4  C) (Temporal)   Resp 18   Ht 1.625 m (5' 3.98\")   Wt 76.9 kg (169 lb 8 oz)   LMP 05/26/2023 (Exact Date)   SpO2 97%   BMI 29.11 kg/m    Body mass index is 29.11 kg/m .  Physical Exam   GENERAL: healthy, alert and no distress    No results found for this or any previous visit (from the past 24 hour(s)).                  "

## 2023-12-14 LAB
C3 SERPL-MCNC: 130 MG/DL (ref 81–157)
C4 SERPL-MCNC: 28 MG/DL (ref 13–39)
CRP SERPL-MCNC: <3 MG/L
T PALLIDUM AB SER QL: NONREACTIVE

## 2023-12-15 LAB
CH50 SERPL-ACNC: 88.2 U/ML
DRVVT SCREEN RATIO: 0.96
DSDNA AB SER-ACNC: 2.7 IU/ML
INR PPP: 0.92 (ref 0.85–1.15)
LA PPP-IMP: NEGATIVE
LOCATION OF TASK: NORMAL
LUPUS INTERPRETATION: NORMAL
PTT RATIO: 1.03
THROMBIN TIME: 17.2 SECONDS (ref 13–19)

## 2023-12-18 ENCOUNTER — PATIENT OUTREACH (OUTPATIENT)
Dept: CARE COORDINATION | Facility: CLINIC | Age: 48
End: 2023-12-18
Payer: COMMERCIAL

## 2024-02-16 ENCOUNTER — OFFICE VISIT (OUTPATIENT)
Dept: FAMILY MEDICINE | Facility: CLINIC | Age: 49
End: 2024-02-16
Attending: PHYSICIAN ASSISTANT
Payer: COMMERCIAL

## 2024-02-16 VITALS
OXYGEN SATURATION: 98 % | HEIGHT: 63 IN | DIASTOLIC BLOOD PRESSURE: 60 MMHG | RESPIRATION RATE: 16 BRPM | SYSTOLIC BLOOD PRESSURE: 112 MMHG | BODY MASS INDEX: 30.92 KG/M2 | WEIGHT: 174.5 LBS | TEMPERATURE: 97.5 F | HEART RATE: 69 BPM

## 2024-02-16 DIAGNOSIS — F41.1 GAD (GENERALIZED ANXIETY DISORDER): ICD-10-CM

## 2024-02-16 DIAGNOSIS — Z12.4 CERVICAL CANCER SCREENING: ICD-10-CM

## 2024-02-16 DIAGNOSIS — E66.811 CLASS 1 OBESITY DUE TO EXCESS CALORIES WITHOUT SERIOUS COMORBIDITY WITH BODY MASS INDEX (BMI) OF 31.0 TO 31.9 IN ADULT: ICD-10-CM

## 2024-02-16 DIAGNOSIS — E03.9 HYPOTHYROIDISM, UNSPECIFIED TYPE: ICD-10-CM

## 2024-02-16 DIAGNOSIS — Z00.00 ROUTINE GENERAL MEDICAL EXAMINATION AT A HEALTH CARE FACILITY: Primary | ICD-10-CM

## 2024-02-16 DIAGNOSIS — E66.09 CLASS 1 OBESITY DUE TO EXCESS CALORIES WITHOUT SERIOUS COMORBIDITY WITH BODY MASS INDEX (BMI) OF 31.0 TO 31.9 IN ADULT: ICD-10-CM

## 2024-02-16 DIAGNOSIS — L40.9 PSORIASIS: ICD-10-CM

## 2024-02-16 LAB
ERYTHROCYTE [DISTWIDTH] IN BLOOD BY AUTOMATED COUNT: 13.4 % (ref 10–15)
HCT VFR BLD AUTO: 41.4 % (ref 35–47)
HGB BLD-MCNC: 13.3 G/DL (ref 11.7–15.7)
MCH RBC QN AUTO: 27.5 PG (ref 26.5–33)
MCHC RBC AUTO-ENTMCNC: 32.1 G/DL (ref 31.5–36.5)
MCV RBC AUTO: 86 FL (ref 78–100)
PLATELET # BLD AUTO: 290 10E3/UL (ref 150–450)
RBC # BLD AUTO: 4.84 10E6/UL (ref 3.8–5.2)
WBC # BLD AUTO: 6.9 10E3/UL (ref 4–11)

## 2024-02-16 PROCEDURE — 87624 HPV HI-RISK TYP POOLED RSLT: CPT | Performed by: FAMILY MEDICINE

## 2024-02-16 PROCEDURE — 99214 OFFICE O/P EST MOD 30 MIN: CPT | Mod: 25 | Performed by: FAMILY MEDICINE

## 2024-02-16 PROCEDURE — 80061 LIPID PANEL: CPT | Performed by: FAMILY MEDICINE

## 2024-02-16 PROCEDURE — 80053 COMPREHEN METABOLIC PANEL: CPT | Performed by: FAMILY MEDICINE

## 2024-02-16 PROCEDURE — 84443 ASSAY THYROID STIM HORMONE: CPT | Performed by: FAMILY MEDICINE

## 2024-02-16 PROCEDURE — 86481 TB AG RESPONSE T-CELL SUSP: CPT | Performed by: FAMILY MEDICINE

## 2024-02-16 PROCEDURE — 36415 COLL VENOUS BLD VENIPUNCTURE: CPT | Performed by: FAMILY MEDICINE

## 2024-02-16 PROCEDURE — 85027 COMPLETE CBC AUTOMATED: CPT | Performed by: FAMILY MEDICINE

## 2024-02-16 PROCEDURE — G0145 SCR C/V CYTO,THINLAYER,RESCR: HCPCS | Performed by: FAMILY MEDICINE

## 2024-02-16 PROCEDURE — 99396 PREV VISIT EST AGE 40-64: CPT | Performed by: FAMILY MEDICINE

## 2024-02-16 RX ORDER — TAPINAROF 10 MG/1000MG
CREAM TOPICAL
COMMUNITY

## 2024-02-16 RX ORDER — PHENTERMINE HYDROCHLORIDE 15 MG/1
15 CAPSULE ORAL EVERY MORNING
Qty: 30 CAPSULE | Refills: 1 | Status: SHIPPED | OUTPATIENT
Start: 2024-02-16 | End: 2024-04-15

## 2024-02-16 SDOH — HEALTH STABILITY: PHYSICAL HEALTH: ON AVERAGE, HOW MANY DAYS PER WEEK DO YOU ENGAGE IN MODERATE TO STRENUOUS EXERCISE (LIKE A BRISK WALK)?: 4 DAYS

## 2024-02-16 ASSESSMENT — ANXIETY QUESTIONNAIRES
7. FEELING AFRAID AS IF SOMETHING AWFUL MIGHT HAPPEN: SEVERAL DAYS
IF YOU CHECKED OFF ANY PROBLEMS ON THIS QUESTIONNAIRE, HOW DIFFICULT HAVE THESE PROBLEMS MADE IT FOR YOU TO DO YOUR WORK, TAKE CARE OF THINGS AT HOME, OR GET ALONG WITH OTHER PEOPLE: SOMEWHAT DIFFICULT
3. WORRYING TOO MUCH ABOUT DIFFERENT THINGS: SEVERAL DAYS
4. TROUBLE RELAXING: NOT AT ALL
GAD7 TOTAL SCORE: 4
2. NOT BEING ABLE TO STOP OR CONTROL WORRYING: NOT AT ALL
1. FEELING NERVOUS, ANXIOUS, OR ON EDGE: SEVERAL DAYS
8. IF YOU CHECKED OFF ANY PROBLEMS, HOW DIFFICULT HAVE THESE MADE IT FOR YOU TO DO YOUR WORK, TAKE CARE OF THINGS AT HOME, OR GET ALONG WITH OTHER PEOPLE?: SOMEWHAT DIFFICULT
GAD7 TOTAL SCORE: 4
5. BEING SO RESTLESS THAT IT IS HARD TO SIT STILL: NOT AT ALL
6. BECOMING EASILY ANNOYED OR IRRITABLE: SEVERAL DAYS
GAD7 TOTAL SCORE: 4
7. FEELING AFRAID AS IF SOMETHING AWFUL MIGHT HAPPEN: SEVERAL DAYS

## 2024-02-16 ASSESSMENT — SOCIAL DETERMINANTS OF HEALTH (SDOH): HOW OFTEN DO YOU GET TOGETHER WITH FRIENDS OR RELATIVES?: ONCE A WEEK

## 2024-02-16 ASSESSMENT — PAIN SCALES - GENERAL: PAINLEVEL: NO PAIN (0)

## 2024-02-16 NOTE — PATIENT INSTRUCTIONS
Preventive Care Advice   This is general advice given by our system to help you stay healthy. However, your care team may have specific advice just for you. Please talk to your care team about your preventive care needs.  Nutrition  Eat 5 or more servings of fruits and vegetables each day.  Try wheat bread, brown rice and whole grain pasta (instead of white bread, rice, and pasta).  Get enough calcium and vitamin D. Check the label on foods and aim for 100% of the RDA (recommended daily allowance).  Lifestyle  Exercise at least 150 minutes each week  (30 minutes a day, 5 days a week).  Do muscle strengthening activities 2 days a week. These help control your weight and prevent disease.  No smoking.  Wear sunscreen to prevent skin cancer.  Have a dental exam and cleaning every 6 months.  Yearly exams  See your health care team every year to talk about:  Any changes in your health.  Any medicines your care team has prescribed.  Preventive care, family planning, and ways to prevent chronic diseases.  Shots (vaccines)   HPV shots (up to age 26), if you've never had them before.  Hepatitis B shots (up to age 59), if you've never had them before.  COVID-19 shot: Get this shot when it's due.  Flu shot: Get a flu shot every year.  Tetanus shot: Get a tetanus shot every 10 years.  Pneumococcal, hepatitis A, and RSV shots: Ask your care team if you need these based on your risk.  Shingles shot (for age 50 and up)  General health tests  Diabetes screening:  Starting at age 35, Get screened for diabetes at least every 3 years.  If you are younger than age 35, ask your care team if you should be screened for diabetes.  Cholesterol test: At age 39, start having a cholesterol test every 5 years, or more often if advised.  Bone density scan (DEXA): At age 50, ask your care team if you should have this scan for osteoporosis (brittle bones).  Hepatitis C: Get tested at least once in your life.  STIs (sexually transmitted  infections)  Before age 24: Ask your care team if you should be screened for STIs.  After age 24: Get screened for STIs if you're at risk. You are at risk for STIs (including HIV) if:  You are sexually active with more than one person.  You don't use condoms every time.  You or a partner was diagnosed with a sexually transmitted infection.  If you are at risk for HIV, ask about PrEP medicine to prevent HIV.  Get tested for HIV at least once in your life, whether you are at risk for HIV or not.  Cancer screening tests  Cervical cancer screening: If you have a cervix, begin getting regular cervical cancer screening tests starting at age 21.  Breast cancer scan (mammogram): If you've ever had breasts, begin having regular mammograms starting at age 40. This is a scan to check for breast cancer.  Colon cancer screening: It is important to start screening for colon cancer at age 45.  Have a colonoscopy test every 10 years (or more often if you're at risk) Or, ask your provider about stool tests like a FIT test every year or Cologuard test every 3 years.  To learn more about your testing options, visit:   https://www.Hired/076082.pdf.  For help making a decision, visit:   https://bit.ly/ld99630.  Prostate cancer screening test: If you have a prostate, ask your care team if a prostate cancer screening test (PSA) at age 55 is right for you.  Lung cancer screening: If you are a current or former smoker ages 50 to 80, ask your care team if ongoing lung cancer screenings are right for you.  For informational purposes only. Not to replace the advice of your health care provider. Copyright   2023 [facility]. All rights reserved. Clinically reviewed by the [employer] [facility]. [employer] 622808 - REV 01/24.    Learning About Stress  What is stress?     Stress is your body's response to a hard situation. Your body can have a physical, emotional, or mental response. Stress is a fact of life for  most people, and it affects everyone differently. What causes stress for you may not be stressful for someone else.  A lot of things can cause stress. You may feel stress when you go on a job interview, take a test, or run a race. This kind of short-term stress is normal and even useful. It can help you if you need to work hard or react quickly. For example, stress can help you finish an important job on time.  Long-term stress is caused by ongoing stressful situations or events. Examples of long-term stress include long-term health problems, ongoing problems at work, or conflicts in your family. Long-term stress can harm your health.  How does stress affect your health?  When you are stressed, your body responds as though you are in danger. It makes hormones that speed up your heart, make you breathe faster, and give you a burst of energy. This is called the fight-or-flight stress response. If the stress is over quickly, your body goes back to normal and no harm is done.  But if stress happens too often or lasts too long, it can have bad effects. Long-term stress can make you more likely to get sick, and it can make symptoms of some diseases worse. If you tense up when you are stressed, you may develop neck, shoulder, or low back pain. Stress is linked to high blood pressure and heart disease.  Stress also harms your emotional health. It can make you cantrell, tense, or depressed. Your relationships may suffer, and you may not do well at work or school.  What can you do to manage stress?  You can try these things to help manage stress:   Do something active. Exercise or activity can help reduce stress. Walking is a great way to get started. Even everyday activities such as housecleaning or yard work can help.  Try yoga or kristin chi. These techniques combine exercise and meditation. You may need some training at first to learn them.  Do something you enjoy. For example, listen to music or go to a movie. Practice your  "hobby or do volunteer work.  Meditate. This can help you relax, because you are not worrying about what happened before or what may happen in the future.  Do guided imagery. Imagine yourself in any setting that helps you feel calm. You can use online videos, books, or a teacher to guide you.  Do breathing exercises. For example:  From a standing position, bend forward from the waist with your knees slightly bent. Let your arms dangle close to the floor.  Breathe in slowly and deeply as you return to a standing position. Roll up slowly and lift your head last.  Hold your breath for just a few seconds in the standing position.  Breathe out slowly and bend forward from the waist.  Let your feelings out. Talk, laugh, cry, and express anger when you need to. Talking with supportive friends or family, a counselor, or a emily leader about your feelings is a healthy way to relieve stress. Avoid discussing your feelings with people who make you feel worse.  Write. It may help to write about things that are bothering you. This helps you find out how much stress you feel and what is causing it. When you know this, you can find better ways to cope.  What can you do to prevent stress?  You might try some of these things to help prevent stress:  Manage your time. This helps you find time to do the things you want and need to do.  Get enough sleep. Your body recovers from the stresses of the day while you are sleeping.  Get support. Your family, friends, and community can make a difference in how you experience stress.  Limit your news feed. Avoid or limit time on social media or news that may make you feel stressed.  Do something active. Exercise or activity can help reduce stress. Walking is a great way to get started.  Where can you learn more?  Go to https://www.healthwise.net/patiented  Enter N032 in the search box to learn more about \"Learning About Stress.\"  Current as of: February 26, 2023               Content Version: " 13.8    7527-0224 [employer].   Care instructions adapted under license by your healthcare professional. If you have questions about a medical condition or this instruction, always ask your healthcare professional. [employer] disclaims any warranty or liability for your use of this information.      Learning About Depression Screening  What is depression screening?  Depression screening is a way to see if you have depression symptoms. It may be done by a doctor or counselor. It's often part of a routine checkup. That's because your mental health is just as important as your physical health.  Depression is a mental health condition that affects how you feel, think, and act. You may:  Have less energy.  Lose interest in your daily activities.  Feel sad and grouchy for a long time.  Depression is very common. It affects people of all ages.  Many things can lead to depression. Some people become depressed after they have a stroke or find out they have a major illness like cancer or heart disease. The death of a loved one or a breakup may lead to depression. It can run in families. Most experts believe that a combination of inherited genes and stressful life events can cause it.  What happens during screening?  You may be asked to fill out a form about your depression symptoms. You and the doctor will discuss your answers. The doctor may ask you more questions to learn more about how you think, act, and feel.  What happens after screening?  If you have symptoms of depression, your doctor will talk to you about your options.  Doctors usually treat depression with medicines or counseling. Often, combining the two works best. Many people don't get help because they think that they'll get over the depression on their own. But people with depression may not get better unless they get treatment.  The cause of depression is not well understood. There may be many factors involved. But if you have  "depression, it's not your fault.  A serious symptom of depression is thinking about death or suicide. If you or someone you care about talks about this or about feeling hopeless, get help right away.  It's important to know that depression can be treated. Medicine, counseling, and self-care may help.  Where can you learn more?  Go to https://www.[employer].net/patiented  Enter T185 in the search box to learn more about \"Learning About Depression Screening.\"  Current as of: June 25, 2023               Content Version: 13.8    0661-3657 [employer].   Care instructions adapted under license by your healthcare professional. If you have questions about a medical condition or this instruction, always ask your healthcare professional. [employer] disclaims any warranty or liability for your use of this information.      "

## 2024-02-16 NOTE — PROGRESS NOTES
Preventive Care Visit  [facility]  Michelle Caldwell DO, Family Medicine  Feb 16, 2024    Assessment & Plan     Routine general medical examination at a health care facility     - Lipid panel reflex to direct LDL Fasting; Future  - Lipid panel reflex to direct LDL Fasting    Cervical cancer screening     - Pap Screen with HPV - recommended age 30 - 65 years    Hypothyroidism, unspecified type  TSH pending - will adjust if needed   - TSH with free T4 reflex; Future  - TSH with free T4 reflex    DIANA (generalized anxiety disorder)  Stable anxiety on low dose zoloft -   Will continue based on controlling symptoms  - sertraline (ZOLOFT) 50 MG tablet; Take 1 tablet (50 mg) by mouth daily    Class 1 obesity due to excess calories without serious comorbidity with body mass index (BMI) of 31.0 to 31.9 in adult  Had trial of toperimate but had cognitive side effects  Did have some success with phentermine - plan to fill   Will also refer to weight clinic to discuss other options   - Adult Comprehensive Weight Management  Referral; Future  - phentermine (ADIPEX-P) 15 MG capsule; Take 1 capsule (15 mg) by mouth every morning    Psoriasis  Working with dermatology   - CBC with platelets; Future  - Comprehensive metabolic panel; Future  - Quantiferon-TB Gold Plus; Future  - CBC with platelets  - Comprehensive metabolic panel  - Quantiferon-TB Gold Plus      Vaginal discomfort  On exam and history of pain   Discussed PT options - she would like to try some home therapies first   Pt will call or RTC if symptoms worsen or do not improve.       Counseling  Appropriate preventive services were discussed with this patient, including applicable screening as appropriate for fall prevention, nutrition, physical activity, Tobacco-use cessation, weight loss and cognition.  Checklist reviewing preventive services available has been given to the patient.  Reviewed patient's diet, addressing concerns and/or  questions.   The patient's PHQ-9 score is consistent with mild depression. She was provided with information regarding depression.             Lio Zhu is a 48 year old, presenting for the following:  Physical        2/16/2024    10:45 AM   Additional Questions   Roomed by [facility] Care Directive  Patient does not have a Health Care Directive or Living Will: Discussed advance care planning with patient; however, patient declined at this time.    HPI  Answers submitted by the patient for this visit:  Patient Health Questionnaire (Submitted on 2/16/2024)  If you checked off any problems, how difficult have these problems made it for you to do your work, take care of things at home, or get along with other people?: Somewhat difficult  PHQ9 TOTAL SCORE: 5  DIANA-7 (Submitted on 2/16/2024)  DIANA 7 TOTAL SCORE: 4              2/16/2024   General Health   How would you rate your overall physical health? (!) FAIR   Feel stress (tense, anxious, or unable to sleep) To some extent   (!) STRESS CONCERN      2/16/2024   Nutrition   Three or more servings of calcium each day? Yes   Diet: Regular (no restrictions)   How many servings of fruit and vegetables per day? (!) 2-3   How many sweetened beverages each day? 0-1         2/16/2024   Exercise   Days per week of moderate/strenous exercise 4 days         2/16/2024   Social Factors   Frequency of gathering with friends or relatives Once a week   Worry food won't last until get money to buy more No   Food not last or not have enough money for food? No   Do you have housing?  Yes   Are you worried about losing your housing? No   Lack of transportation? No   Unable to get utilities (heat,electricity)? No         2/16/2024   Fall Risk   Fallen 2 or more times in the past year? No   Trouble with walking or balance? No          2/16/2024   Dental   Dentist two times every year? Yes         2/16/2024   TB Screening   Were you born outside of [place]?  No       Today's  PHQ-9 Score:       2/16/2024    10:40 AM   PHQ-9 SCORE   PHQ-9 Total Score MyChart 5 (Mild depression)   PHQ-9 Total Score 5         2/16/2024   Substance Use   Alcohol more than 3/day or more than 7/wk No   Do you use any other substances recreationally? No     Social History     Tobacco Use    Smoking status: Never    Smokeless tobacco: Never   Vaping Use    Vaping Use: Never used   Substance Use Topics    Alcohol use: Yes     Comment: 3-5/ week    Drug use: No             2/16/2024   Breast Cancer Screening   Family history of breast, colon, or ovarian cancer? No / Unknown      Mammogram Screening - Mammogram every 1-2 years updated in Health Maintenance based on mutual decision making        2/16/2024   STI Screening   New sexual partner(s) since last STI/HIV test? No     History of abnormal Pap smear: NO - age 30-65 PAP every 5 years with negative HPV co-testing recommended       The 10-year ASCVD risk score (Cathy RANDOLPH, et al., 2019) is: 0.7%    Values used to calculate the score:      Age: 48 years      Sex: Female      Is Non- : No      Diabetic: No      Tobacco smoker: No      Systolic Blood Pressure: 112 mmHg      Is BP treated: No      HDL Cholesterol: 62 mg/dL      Total Cholesterol: 180 mg/dL            2/16/2024   Contraception/Family Planning   Questions about contraception or family planning No        Reviewed and updated as needed this visit by Provider                    Patient Active Problem List   Diagnosis    Hypothyroidism    Contact dermatitis and other eczema, due to unspecified cause    Abnormal glandular Papanicolaou smear of cervix    Polyarteritis nodosa (H)    Bladder cancer (H)    CARDIOVASCULAR SCREENING; LDL GOAL LESS THAN 160    Recurrent pregnancy loss    DIANA (generalized anxiety disorder)    Mild intermittent asthma with acute exacerbation    Psoriasis    Lateral epicondylitis of left elbow    Left tennis elbow    Left elbow pain     Past Surgical History:  "  Procedure Laterality Date    ABDOMEN SURGERY      BIOPSY      c sections      COLONOSCOPY N/A 1/18/2023    Procedure: COLONOSCOPY;  Surgeon: Krista Bauer MD;  Location:  GI    [facility] INCISE/FULGUR LESN BLADDER  08/31/2007    non-invasive Gr I bladder tumor    [facility] NONSPECIFIC PROCEDURE  03/2003    Colposcopy       Social History     Tobacco Use    Smoking status: Never    Smokeless tobacco: Never   Substance Use Topics    Alcohol use: Yes     Comment: 3-5/ week     Family History   Problem Relation Age of Onset    Hypertension Mother     Diabetes Father         adult onset-using insulin    Chronic Obstructive Pulmonary Disease Father     C.A.D. Maternal Grandmother         CABG at 63    Alcohol/Drug Maternal Grandfather     C.A.D. Paternal Grandmother         MI--70's-80's    Respiratory Paternal Grandfather         asthma             Review of Systems  Constitutional, neuro, ENT, endocrine, pulmonary, cardiac, gastrointestinal, genitourinary, musculoskeletal, integument and psychiatric systems are negative, except as otherwise noted.     Objective    Exam  /60 (BP Location: Right arm, Patient Position: Sitting, Cuff Size: Adult Regular)   Pulse 69   Temp 97.5  F (36.4  C) (Temporal)   Resp 16   Ht 1.588 m (5' 2.52\")   Wt 79.2 kg (174 lb 8 oz)   LMP 05/26/2023   SpO2 98%   Breastfeeding No   BMI 31.39 kg/m     Estimated body mass index is 31.39 kg/m  as calculated from the following:    Height as of this encounter: 1.588 m (5' 2.52\").    Weight as of this encounter: 79.2 kg (174 lb 8 oz).    Physical Exam  GENERAL: alert and no distress  EYES: Eyes grossly normal to inspection, PERRL and conjunctivae and sclerae normal  HENT: ear canals and TM's normal, nose and mouth without ulcers or lesions  NECK: no adenopathy, no asymmetry, masses, or scars  RESP: lungs clear to auscultation - no rales, rhonchi or wheezes  BREAST: normal without masses, tenderness or nipple discharge and no palpable " axillary masses or adenopathy  CV: regular rate and rhythm, normal S1 S2, no S3 or S4, no murmur, click or rub, no peripheral edema  ABDOMEN: soft, nontender, no hepatosplenomegaly, no masses and bowel sounds normal   (female): normal female external genitalia, normal urethral meatus, normal vaginal mucosa  MS: no gross musculoskeletal defects noted, no edema  SKIN: no suspicious lesions or rashes  NEURO: Normal strength and tone, mentation intact and speech normal  PSYCH: mentation appears normal, affect normal/bright      Signed Electronically by: Michelle Caldwell DO

## 2024-02-17 LAB
ALBUMIN SERPL BCG-MCNC: 4.3 G/DL (ref 3.5–5.2)
ALP SERPL-CCNC: 80 U/L (ref 40–150)
ALT SERPL W P-5'-P-CCNC: 23 U/L (ref 0–50)
ANION GAP SERPL CALCULATED.3IONS-SCNC: 10 MMOL/L (ref 7–15)
AST SERPL W P-5'-P-CCNC: 22 U/L (ref 0–45)
BILIRUB SERPL-MCNC: 0.3 MG/DL
BUN SERPL-MCNC: 8.4 MG/DL (ref 6–20)
CALCIUM SERPL-MCNC: 9.4 MG/DL (ref 8.6–10)
CHLORIDE SERPL-SCNC: 103 MMOL/L (ref 98–107)
CHOLEST SERPL-MCNC: 164 MG/DL
CREAT SERPL-MCNC: 0.84 MG/DL (ref 0.51–0.95)
DEPRECATED HCO3 PLAS-SCNC: 25 MMOL/L (ref 22–29)
EGFRCR SERPLBLD CKD-EPI 2021: 84 ML/MIN/1.73M2
FASTING STATUS PATIENT QL REPORTED: YES
GLUCOSE SERPL-MCNC: 82 MG/DL (ref 70–99)
HDLC SERPL-MCNC: 51 MG/DL
LDLC SERPL CALC-MCNC: 90 MG/DL
NONHDLC SERPL-MCNC: 113 MG/DL
POTASSIUM SERPL-SCNC: 4.2 MMOL/L (ref 3.4–5.3)
PROT SERPL-MCNC: 7.6 G/DL (ref 6.4–8.3)
SODIUM SERPL-SCNC: 138 MMOL/L (ref 135–145)
TRIGL SERPL-MCNC: 113 MG/DL
TSH SERPL DL<=0.005 MIU/L-ACNC: 0.41 UIU/ML (ref 0.3–4.2)

## 2024-02-18 LAB
GAMMA INTERFERON BACKGROUND BLD IA-ACNC: 0.03 IU/ML
M TB IFN-G BLD-IMP: NEGATIVE
M TB IFN-G CD4+ BCKGRND COR BLD-ACNC: 1.47 IU/ML
MITOGEN IGNF BCKGRD COR BLD-ACNC: -0.01 IU/ML
MITOGEN IGNF BCKGRD COR BLD-ACNC: 0 IU/ML
QUANTIFERON MITOGEN: 1.5 IU/ML
QUANTIFERON NIL TUBE: 0.03 IU/ML
QUANTIFERON TB1 TUBE: 0.02 IU/ML
QUANTIFERON TB2 TUBE: 0.03

## 2024-02-20 RX ORDER — LEVOTHYROXINE SODIUM 137 UG/1
137 TABLET ORAL DAILY
Qty: 90 TABLET | Refills: 3 | Status: SHIPPED | OUTPATIENT
Start: 2024-02-20

## 2024-02-20 NOTE — RESULT ENCOUNTER NOTE
Dear Audra,   Your test results are all back -   -Cholesterol levels (LDL,HDL, Triglycerides) are normal.  ADVISE: rechecking in 1 year.   -TSH (thyroid stimulating hormone) level is normal which indicates appropriate thyroid replacement dosing.  ADVISE: continuing same replacement dose and rechecking this in 12 months.  Let us know if you have any questions.  -Michelle Caldwell, DO

## 2024-02-21 LAB
BKR LAB AP GYN ADEQUACY: NORMAL
BKR LAB AP GYN INTERPRETATION: NORMAL
BKR LAB AP HPV REFLEX: NORMAL
BKR LAB AP PREVIOUS ABNORMAL: NORMAL
PATH REPORT.COMMENTS IMP SPEC: NORMAL
PATH REPORT.COMMENTS IMP SPEC: NORMAL
PATH REPORT.RELEVANT HX SPEC: NORMAL

## 2024-02-22 ENCOUNTER — TELEPHONE (OUTPATIENT)
Dept: FAMILY MEDICINE | Facility: CLINIC | Age: 49
End: 2024-02-22
Payer: COMMERCIAL

## 2024-02-22 NOTE — TELEPHONE ENCOUNTER
Prior Authorization Retail Medication Request    Medication/Dose: levothyroxine (SYNTHROID/LEVOTHROID) 137 MCG tablet 90 tablet 3 2/20/2024 - No  Sig - Route: Take 1 tablet (137 mcg) by mouth daily Except on Sundays only take 1/2 tablet - Oral  Sent to pharmacy as: Levothyroxine Sodium 137 MCG Oral Tablet (SYNTHROID/LEVOTHROID)      Diagnosis and ICD code (if different than what is on RX):  E03.9  New/renewal/insurance change PA/secondary ins. PA:  Previously Tried and Failed:    Rationale:      Insurance   Primary: [facility]  Insurance ID:  [id number]    Secondary (if applicable):  Insurance ID:      Pharmacy Information (if different than what is on RX)  [id number]  [place] [address] AT [facility] OF [facility] [address]    Phone:  [phone]  Fax:[phone]

## 2024-02-26 LAB
HUMAN PAPILLOMA VIRUS 16 DNA: NEGATIVE
HUMAN PAPILLOMA VIRUS 18 DNA: NEGATIVE
HUMAN PAPILLOMA VIRUS FINAL DIAGNOSIS: NORMAL
HUMAN PAPILLOMA VIRUS OTHER HR: NEGATIVE

## 2024-03-07 NOTE — TELEPHONE ENCOUNTER
Central Prior Authorization Team   Phone: [phone]    PA Initiation    Medication: levothyroxine (SYNTHROID/LEVOTHROID) 137 MCG tablet  Insurance Company: [employer] Non-Specialty PA's - Phone [phone] Fax [phone]  Pharmacy Filling the Rx: [id number] - [place], [place] - [address] AT [facility] OF  [address]  Filling Pharmacy Phone: [phone]  Filling Pharmacy Fax:    Start Date: 3/7/2024         Discharged

## 2024-03-07 NOTE — TELEPHONE ENCOUNTER
Prior Authorization Not Needed per Insurance    Medication: levothyroxine (SYNTHROID/LEVOTHROID) 137 MCG tablet  Insurance Company: [employer] Non-Specialty PA's - Phone [phone] Fax [phone]  Expected CoPay:      Pharmacy Filling the Rx: [id number] - [place], [place] - [address] AT [facility] [address]  Pharmacy Notified:  Yes  Patient Notified:  **Instructed pharmacy to notify patient when script is ready to /ship.**

## 2024-04-03 ENCOUNTER — TRANSFERRED RECORDS (OUTPATIENT)
Dept: HEALTH INFORMATION MANAGEMENT | Facility: CLINIC | Age: 49
End: 2024-04-03
Payer: COMMERCIAL

## 2024-04-05 ENCOUNTER — TRANSFERRED RECORDS (OUTPATIENT)
Dept: HEALTH INFORMATION MANAGEMENT | Facility: CLINIC | Age: 49
End: 2024-04-05
Payer: COMMERCIAL

## 2024-04-15 ENCOUNTER — MYC REFILL (OUTPATIENT)
Dept: FAMILY MEDICINE | Facility: CLINIC | Age: 49
End: 2024-04-15
Payer: COMMERCIAL

## 2024-04-15 DIAGNOSIS — E66.811 CLASS 1 OBESITY DUE TO EXCESS CALORIES WITHOUT SERIOUS COMORBIDITY WITH BODY MASS INDEX (BMI) OF 31.0 TO 31.9 IN ADULT: ICD-10-CM

## 2024-04-15 DIAGNOSIS — E66.09 CLASS 1 OBESITY DUE TO EXCESS CALORIES WITHOUT SERIOUS COMORBIDITY WITH BODY MASS INDEX (BMI) OF 31.0 TO 31.9 IN ADULT: ICD-10-CM

## 2024-04-16 RX ORDER — PHENTERMINE HYDROCHLORIDE 30 MG/1
30 CAPSULE ORAL EVERY MORNING
Qty: 30 CAPSULE | Refills: 2 | Status: SHIPPED | OUTPATIENT
Start: 2024-04-16

## 2024-04-25 ENCOUNTER — MYC MEDICAL ADVICE (OUTPATIENT)
Dept: FAMILY MEDICINE | Facility: CLINIC | Age: 49
End: 2024-04-25
Payer: COMMERCIAL

## 2024-06-06 NOTE — RESULT ENCOUNTER NOTE
"Severino Zhu  Your attached chest xray is within normal limits.  Please contact the office with any questions or concerns.    Libertad Calzada \"Yoni\" CARROLL N[place]    " Chief Complaint   Patient presents with    Follow-up    Breathing Problem

## 2024-09-28 DIAGNOSIS — F41.1 GAD (GENERALIZED ANXIETY DISORDER): ICD-10-CM

## 2024-10-02 ENCOUNTER — MYC MEDICAL ADVICE (OUTPATIENT)
Dept: FAMILY MEDICINE | Facility: CLINIC | Age: 49
End: 2024-10-02
Payer: COMMERCIAL

## 2024-10-07 ENCOUNTER — OFFICE VISIT (OUTPATIENT)
Dept: FAMILY MEDICINE | Facility: CLINIC | Age: 49
End: 2024-10-07
Payer: COMMERCIAL

## 2024-10-07 VITALS
WEIGHT: 167.7 LBS | BODY MASS INDEX: 29.71 KG/M2 | DIASTOLIC BLOOD PRESSURE: 73 MMHG | RESPIRATION RATE: 16 BRPM | HEIGHT: 63 IN | HEART RATE: 65 BPM | SYSTOLIC BLOOD PRESSURE: 111 MMHG | OXYGEN SATURATION: 100 % | TEMPERATURE: 97.1 F

## 2024-10-07 DIAGNOSIS — N93.8 DUB (DYSFUNCTIONAL UTERINE BLEEDING): Primary | ICD-10-CM

## 2024-10-07 PROCEDURE — 36415 COLL VENOUS BLD VENIPUNCTURE: CPT | Performed by: FAMILY MEDICINE

## 2024-10-07 PROCEDURE — 84443 ASSAY THYROID STIM HORMONE: CPT | Performed by: FAMILY MEDICINE

## 2024-10-07 PROCEDURE — 99214 OFFICE O/P EST MOD 30 MIN: CPT | Performed by: FAMILY MEDICINE

## 2024-10-07 ASSESSMENT — PATIENT HEALTH QUESTIONNAIRE - PHQ9
10. IF YOU CHECKED OFF ANY PROBLEMS, HOW DIFFICULT HAVE THESE PROBLEMS MADE IT FOR YOU TO DO YOUR WORK, TAKE CARE OF THINGS AT HOME, OR GET ALONG WITH OTHER PEOPLE: NOT DIFFICULT AT ALL
SUM OF ALL RESPONSES TO PHQ QUESTIONS 1-9: 5
SUM OF ALL RESPONSES TO PHQ QUESTIONS 1-9: 5

## 2024-10-07 ASSESSMENT — ANXIETY QUESTIONNAIRES
8. IF YOU CHECKED OFF ANY PROBLEMS, HOW DIFFICULT HAVE THESE MADE IT FOR YOU TO DO YOUR WORK, TAKE CARE OF THINGS AT HOME, OR GET ALONG WITH OTHER PEOPLE?: SOMEWHAT DIFFICULT
1. FEELING NERVOUS, ANXIOUS, OR ON EDGE: SEVERAL DAYS
5. BEING SO RESTLESS THAT IT IS HARD TO SIT STILL: NOT AT ALL
2. NOT BEING ABLE TO STOP OR CONTROL WORRYING: SEVERAL DAYS
GAD7 TOTAL SCORE: 5
7. FEELING AFRAID AS IF SOMETHING AWFUL MIGHT HAPPEN: SEVERAL DAYS
IF YOU CHECKED OFF ANY PROBLEMS ON THIS QUESTIONNAIRE, HOW DIFFICULT HAVE THESE PROBLEMS MADE IT FOR YOU TO DO YOUR WORK, TAKE CARE OF THINGS AT HOME, OR GET ALONG WITH OTHER PEOPLE: SOMEWHAT DIFFICULT
GAD7 TOTAL SCORE: 5
6. BECOMING EASILY ANNOYED OR IRRITABLE: SEVERAL DAYS
4. TROUBLE RELAXING: NOT AT ALL
3. WORRYING TOO MUCH ABOUT DIFFERENT THINGS: SEVERAL DAYS
GAD7 TOTAL SCORE: 5
7. FEELING AFRAID AS IF SOMETHING AWFUL MIGHT HAPPEN: SEVERAL DAYS

## 2024-10-07 ASSESSMENT — ASTHMA QUESTIONNAIRES
QUESTION_2 LAST FOUR WEEKS HOW OFTEN HAVE YOU HAD SHORTNESS OF BREATH: NOT AT ALL
ACT_TOTALSCORE: 25
QUESTION_3 LAST FOUR WEEKS HOW OFTEN DID YOUR ASTHMA SYMPTOMS (WHEEZING, COUGHING, SHORTNESS OF BREATH, CHEST TIGHTNESS OR PAIN) WAKE YOU UP AT NIGHT OR EARLIER THAN USUAL IN THE MORNING: NOT AT ALL
QUESTION_1 LAST FOUR WEEKS HOW MUCH OF THE TIME DID YOUR ASTHMA KEEP YOU FROM GETTING AS MUCH DONE AT WORK, SCHOOL OR AT HOME: NONE OF THE TIME
QUESTION_4 LAST FOUR WEEKS HOW OFTEN HAVE YOU USED YOUR RESCUE INHALER OR NEBULIZER MEDICATION (SUCH AS ALBUTEROL): NOT AT ALL
ACT_TOTALSCORE: 25
QUESTION_5 LAST FOUR WEEKS HOW WOULD YOU RATE YOUR ASTHMA CONTROL: COMPLETELY CONTROLLED

## 2024-10-07 ASSESSMENT — PAIN SCALES - GENERAL: PAINLEVEL: NO PAIN (0)

## 2024-10-07 NOTE — PROGRESS NOTES
"  Assessment & Plan     DUB (dysfunctional uterine bleeding)  Unclear if this is truly post menopausal bleeding or DUB with PCOS/etc    Evaluate with ultrasound and EMB as needed  - US Pelvic Complete with Transvaginal  - TSH with free T4 reflex  - TSH with free T4 reflex            BMI  Estimated body mass index is 30.16 kg/m  as calculated from the following:    Height as of this encounter: 1.588 m (5' 2.52\").    Weight as of this encounter: 76.1 kg (167 lb 11.2 oz).             Subjective   Audra is a 49 year old, presenting for the following health issues:  Abnormal Uterine Bleeding        10/7/2024    10:28 AM   Additional Questions   Roomed by Mayte     History of Present Illness       Reason for visit:  Post menopause bleeding  Symptom onset:  More than a month   She is taking medications regularly.           Audra is 49 year old female, with PMH of bladder cancer in remission, who presents with menopausal bleeding, as seen below.       Concern - Post Menopausal Bleeding   Onset: May 2024   Description: Patient had gone 12 months without a period but since May 2024, has been having abnormal cycles with bleeding periods lasting 7 days   States that bleeding period has been random   Shares that recent cycle had gel like blood, stopped for 7 days and returned after a couple days - then lasted for 3 days   Bleeding is not heavy   Last bleeding period - October 2nd   Intensity: moderate  Progression of Symptoms:  same and constant  Accompanying Signs & Symptoms: Light cramping, low back pain, fatigue   Previous history of similar problem: None   Precipitating factors:        Worsened by: None  Alleviating factors:        Improved by: None  Therapies tried and outcome: None        Review of Systems  CONSTITUTIONAL: NEGATIVE for fever, chills, change in weight  INTEGUMENTARY/SKIN: NEGATIVE for worrisome rashes, moles or lesions  GI: NEGATIVE for melena  : NEGATIVE for frequency, dysuria, or hematuria  HEME: " "NEGATIVE for bleeding problems  GYN: see HPI, discomfort with intercourse.       Objective    /73 (BP Location: Right arm, Patient Position: Sitting, Cuff Size: Adult Regular)   Pulse 65   Temp 97.1  F (36.2  C) (Skin)   Resp 16   Ht 1.588 m (5' 2.52\")   Wt 76.1 kg (167 lb 11.2 oz)   LMP 10/02/2024   SpO2 100%   Breastfeeding No   BMI 30.16 kg/m    Body mass index is 30.16 kg/m .  Physical Exam   GENERAL: alert and no distress   (female) w/bimanual: normal female external genitalia, normal urethral meatus, Dry vaginal mucosa, normal adnexa. Slightly enlarged uterus without masses or discharge. Cervix  atrophied and stiffened          I was present with the NPP student, Dominique Wolfe RN, who participated in the service and in the documentation of the services provided. I have verified the history and personally performed the physical exam and medical decision making, as documented by the student and edited by me      Signed Electronically by: Nida Tom MD    "

## 2024-10-08 LAB — TSH SERPL DL<=0.005 MIU/L-ACNC: 1.38 UIU/ML (ref 0.3–4.2)

## 2024-10-14 ENCOUNTER — ANCILLARY PROCEDURE (OUTPATIENT)
Dept: ULTRASOUND IMAGING | Facility: CLINIC | Age: 49
End: 2024-10-14
Attending: FAMILY MEDICINE
Payer: COMMERCIAL

## 2024-10-14 DIAGNOSIS — N93.8 DUB (DYSFUNCTIONAL UTERINE BLEEDING): ICD-10-CM

## 2024-10-14 PROCEDURE — 76856 US EXAM PELVIC COMPLETE: CPT

## 2024-10-15 ENCOUNTER — MYC MEDICAL ADVICE (OUTPATIENT)
Dept: FAMILY MEDICINE | Facility: CLINIC | Age: 49
End: 2024-10-15
Payer: COMMERCIAL

## 2024-10-15 DIAGNOSIS — N83.201 CYST OF RIGHT OVARY: ICD-10-CM

## 2024-10-15 DIAGNOSIS — R93.89 ABNORMAL PELVIC ULTRASOUND: Primary | ICD-10-CM

## 2024-10-15 NOTE — TELEPHONE ENCOUNTER
Called to discuss  Findings on ultrasound  Will refer to gynecology for further evaluation and recommendations  PN

## 2024-10-15 NOTE — TELEPHONE ENCOUNTER
Dr. Alva / Dr. Caldwell :    Please see below [employer] message and advise. Results not scheduled to release on MyC to pt until 10/21/24.     US PELVIC TRANSABDOMINAL AND TRANSVAGINAL  results from 10/14/24:  RADIOLOGIST IMPRESSION:  1.  Distended appearance of the cervix which contains complex fluid. 9 mm cervical mass is noted. Recommend gynecological consultation with consideration for tissue diagnosis.  2.  Complex 1.3 cm cyst in the right ovary, possibly a hemorrhagic cyst or endometrioma. Recommend 6-12 week ultrasound follow-up.       Thanks,   Isabel SERRA RN

## 2024-12-06 ENCOUNTER — LAB REQUISITION (OUTPATIENT)
Dept: LAB | Facility: CLINIC | Age: 49
End: 2024-12-06
Payer: COMMERCIAL

## 2024-12-06 DIAGNOSIS — N93.9 ABNORMAL UTERINE AND VAGINAL BLEEDING, UNSPECIFIED: ICD-10-CM

## 2024-12-06 PROCEDURE — 88305 TISSUE EXAM BY PATHOLOGIST: CPT | Mod: 26 | Performed by: STUDENT IN AN ORGANIZED HEALTH CARE EDUCATION/TRAINING PROGRAM

## 2024-12-06 PROCEDURE — 88305 TISSUE EXAM BY PATHOLOGIST: CPT | Mod: TC,ORL | Performed by: OBSTETRICS & GYNECOLOGY

## 2024-12-09 LAB
PATH REPORT.COMMENTS IMP SPEC: NORMAL
PATH REPORT.COMMENTS IMP SPEC: NORMAL
PATH REPORT.FINAL DX SPEC: NORMAL
PATH REPORT.GROSS SPEC: NORMAL
PATH REPORT.MICROSCOPIC SPEC OTHER STN: NORMAL
PATH REPORT.RELEVANT HX SPEC: NORMAL
PHOTO IMAGE: NORMAL

## 2025-05-06 ENCOUNTER — VIRTUAL VISIT (OUTPATIENT)
Dept: FAMILY MEDICINE | Facility: CLINIC | Age: 50
End: 2025-05-06
Payer: COMMERCIAL

## 2025-05-06 DIAGNOSIS — R53.83 OTHER FATIGUE: Primary | ICD-10-CM

## 2025-05-06 DIAGNOSIS — R79.89 ELEVATED LFTS: ICD-10-CM

## 2025-05-06 DIAGNOSIS — E66.09 CLASS 1 OBESITY DUE TO EXCESS CALORIES WITH SERIOUS COMORBIDITY IN ADULT, UNSPECIFIED BMI: ICD-10-CM

## 2025-05-06 DIAGNOSIS — J30.1 SEASONAL ALLERGIC RHINITIS DUE TO POLLEN: ICD-10-CM

## 2025-05-06 DIAGNOSIS — E66.811 CLASS 1 OBESITY DUE TO EXCESS CALORIES WITH SERIOUS COMORBIDITY IN ADULT, UNSPECIFIED BMI: ICD-10-CM

## 2025-05-06 PROCEDURE — 98006 SYNCH AUDIO-VIDEO EST MOD 30: CPT | Performed by: FAMILY MEDICINE

## 2025-05-06 ASSESSMENT — ANXIETY QUESTIONNAIRES
5. BEING SO RESTLESS THAT IT IS HARD TO SIT STILL: NOT AT ALL
8. IF YOU CHECKED OFF ANY PROBLEMS, HOW DIFFICULT HAVE THESE MADE IT FOR YOU TO DO YOUR WORK, TAKE CARE OF THINGS AT HOME, OR GET ALONG WITH OTHER PEOPLE?: NOT DIFFICULT AT ALL
1. FEELING NERVOUS, ANXIOUS, OR ON EDGE: NOT AT ALL
IF YOU CHECKED OFF ANY PROBLEMS ON THIS QUESTIONNAIRE, HOW DIFFICULT HAVE THESE PROBLEMS MADE IT FOR YOU TO DO YOUR WORK, TAKE CARE OF THINGS AT HOME, OR GET ALONG WITH OTHER PEOPLE: NOT DIFFICULT AT ALL
GAD7 TOTAL SCORE: 0
GAD7 TOTAL SCORE: 0
7. FEELING AFRAID AS IF SOMETHING AWFUL MIGHT HAPPEN: NOT AT ALL
4. TROUBLE RELAXING: NOT AT ALL
6. BECOMING EASILY ANNOYED OR IRRITABLE: NOT AT ALL
3. WORRYING TOO MUCH ABOUT DIFFERENT THINGS: NOT AT ALL
7. FEELING AFRAID AS IF SOMETHING AWFUL MIGHT HAPPEN: NOT AT ALL
GAD7 TOTAL SCORE: 0
2. NOT BEING ABLE TO STOP OR CONTROL WORRYING: NOT AT ALL

## 2025-05-06 ASSESSMENT — PATIENT HEALTH QUESTIONNAIRE - PHQ9
10. IF YOU CHECKED OFF ANY PROBLEMS, HOW DIFFICULT HAVE THESE PROBLEMS MADE IT FOR YOU TO DO YOUR WORK, TAKE CARE OF THINGS AT HOME, OR GET ALONG WITH OTHER PEOPLE: NOT DIFFICULT AT ALL
SUM OF ALL RESPONSES TO PHQ QUESTIONS 1-9: 3
SUM OF ALL RESPONSES TO PHQ QUESTIONS 1-9: 3

## 2025-05-06 NOTE — PROGRESS NOTES
Audra is a 49 year old who is being evaluated via a billable video visit.    How would you like to obtain your AVS? MyChart  If the video visit is dropped, the invitation should be resent by:   Will anyone else be joining your video visit? No      Assessment & Plan     Other fatigue  Fatigue with congestion and PND -   Unclear if allergies or post viral - other diff dx includes sinusitis  Will check labs to look for allergies, CBC and thyroid  Continue flonase   Add claritin daily back to medications  Switch dayquil to mucinex   - CBC with platelets and differential; Future  - TSH with free T4 reflex; Future    Seasonal allergic rhinitis due to pollen  As above   - [place] Resp Allergen Panel; Future    Class 1 obesity due to excess calories with serious comorbidity in adult, unspecified BMI  Reviewed current weight and using semaglutide -   She is not losing weight so one more dose adjustment   If persists wonders about zepbound  She will send an evisit to get prescription send to [facility] direct pharmacy     Elevated LFTs  Elevated LFT so needs follow-up   Lab is ordered and she just needs lab only visit                Subjective   Audra is a 49 year old, presenting for the following health issues:  No chief complaint on file.    History of Present Illness       Reason for visit:  Cold symptoms lasting over a month  Symptom onset:  More than a month  Symptoms include:  Nasal congestion and drainage, fatigue and difficulty hearing  Symptom intensity:  Mild  Symptom progression:  Improving  Had these symptoms before:  Yes  Has tried/received treatment for these symptoms:  No  What makes it worse:  Not getting adequate sleep   She is taking medications regularly.      Has been dealing with cold for one month - will feel better then get worse.  Head congestion and draining in her throat  Feels like hearing is also off  Feeling exhausted    Started after coming back from spring break -   Seemed like common cold -  "stuffed up head and congestion  Had to travel the next week for work and ear symptoms   Got worse while she was there   Trying allergy medication - doesn't help  Started nasal flonase - 1+ week  Also tried loratidine  Tried cold medicine which helps  - using target brand generic cold and flu (like a dayquil)  Feeling whipped out.    Right ear - hearing issues and trouble getting it to pop  Doesn't feel pressure into sinus or other issues  No cough   Feels like constant PND     Weight -   Current weight 158 pounds  Weight started at 178 (weight in 2023 was 190 pounds with BMI 32)  Height 5 '3\"    Taking semiglutide  - had diarrhea on the middle doses  But that is better but no longer losing weight   There is no height or weight on file to calculate BMI.                 Review of Systems  Constitutional, HEENT, cardiovascular, pulmonary, GI, , musculoskeletal, neuro, skin, endocrine and psych systems are negative, except as otherwise noted.      Objective           Vitals:  No vitals were obtained today due to virtual visit.    Physical Exam   GENERAL: alert and no distress  EYES: Eyes grossly normal to inspection.  No discharge or erythema, or obvious scleral/conjunctival abnormalities.  RESP: No audible wheeze, cough, or visible cyanosis.    SKIN: Visible skin clear. No significant rash, abnormal pigmentation or lesions.  NEURO: Cranial nerves grossly intact.  Mentation and speech appropriate for age.  PSYCH: Appropriate affect, tone, and pace of words          Video-Visit Details    Type of service:  Video Visit   Originating Location (pt. Location): Home    Distant Location (provider location):  On-site  Platform used for Video Visit: Felice  Signed Electronically by: Michelle Caldwell,     "

## 2025-05-12 ENCOUNTER — MYC MEDICAL ADVICE (OUTPATIENT)
Dept: FAMILY MEDICINE | Facility: CLINIC | Age: 50
End: 2025-05-12

## 2025-05-12 ENCOUNTER — LAB (OUTPATIENT)
Dept: LAB | Facility: CLINIC | Age: 50
End: 2025-05-12
Payer: COMMERCIAL

## 2025-05-12 DIAGNOSIS — J30.1 SEASONAL ALLERGIC RHINITIS DUE TO POLLEN: ICD-10-CM

## 2025-05-12 DIAGNOSIS — R53.83 OTHER FATIGUE: ICD-10-CM

## 2025-05-12 DIAGNOSIS — C67.9 MALIGNANT NEOPLASM OF URINARY BLADDER, UNSPECIFIED SITE (H): ICD-10-CM

## 2025-05-12 DIAGNOSIS — R79.89 ELEVATED LFTS: ICD-10-CM

## 2025-05-12 DIAGNOSIS — C67.9 MALIGNANT NEOPLASM OF URINARY BLADDER, UNSPECIFIED SITE (H): Primary | ICD-10-CM

## 2025-05-12 LAB
ALBUMIN SERPL BCG-MCNC: 4.3 G/DL (ref 3.5–5.2)
ALBUMIN UR-MCNC: NEGATIVE MG/DL
ALP SERPL-CCNC: 100 U/L (ref 40–150)
ALT SERPL W P-5'-P-CCNC: 28 U/L (ref 0–50)
APPEARANCE UR: CLEAR
AST SERPL W P-5'-P-CCNC: 27 U/L (ref 0–45)
BACTERIA #/AREA URNS HPF: ABNORMAL /HPF
BASOPHILS # BLD AUTO: 0 10E3/UL (ref 0–0.2)
BASOPHILS NFR BLD AUTO: 1 %
BILIRUB DIRECT SERPL-MCNC: 0.12 MG/DL (ref 0–0.3)
BILIRUB SERPL-MCNC: 0.4 MG/DL
BILIRUB UR QL STRIP: NEGATIVE
COLOR UR AUTO: YELLOW
EOSINOPHIL # BLD AUTO: 0.2 10E3/UL (ref 0–0.7)
EOSINOPHIL NFR BLD AUTO: 4 %
ERYTHROCYTE [DISTWIDTH] IN BLOOD BY AUTOMATED COUNT: 12.9 % (ref 10–15)
GLUCOSE UR STRIP-MCNC: NEGATIVE MG/DL
HCT VFR BLD AUTO: 39.7 % (ref 35–47)
HGB BLD-MCNC: 13 G/DL (ref 11.7–15.7)
HGB UR QL STRIP: NEGATIVE
IMM GRANULOCYTES # BLD: 0 10E3/UL
IMM GRANULOCYTES NFR BLD: 0 %
KETONES UR STRIP-MCNC: NEGATIVE MG/DL
LEUKOCYTE ESTERASE UR QL STRIP: ABNORMAL
LYMPHOCYTES # BLD AUTO: 1.8 10E3/UL (ref 0.8–5.3)
LYMPHOCYTES NFR BLD AUTO: 29 %
MCH RBC QN AUTO: 27.5 PG (ref 26.5–33)
MCHC RBC AUTO-ENTMCNC: 32.7 G/DL (ref 31.5–36.5)
MCV RBC AUTO: 84 FL (ref 78–100)
MONOCYTES # BLD AUTO: 0.4 10E3/UL (ref 0–1.3)
MONOCYTES NFR BLD AUTO: 7 %
NEUTROPHILS # BLD AUTO: 3.7 10E3/UL (ref 1.6–8.3)
NEUTROPHILS NFR BLD AUTO: 60 %
NITRATE UR QL: NEGATIVE
PH UR STRIP: 5.5 [PH] (ref 5–7)
PLATELET # BLD AUTO: 288 10E3/UL (ref 150–450)
PROT SERPL-MCNC: 8 G/DL (ref 6.4–8.3)
RBC # BLD AUTO: 4.73 10E6/UL (ref 3.8–5.2)
RBC #/AREA URNS AUTO: ABNORMAL /HPF
SP GR UR STRIP: 1.01 (ref 1–1.03)
SQUAMOUS #/AREA URNS AUTO: ABNORMAL /LPF
TSH SERPL DL<=0.005 MIU/L-ACNC: 0.76 UIU/ML (ref 0.3–4.2)
UROBILINOGEN UR STRIP-ACNC: 0.2 E.U./DL
WBC # BLD AUTO: 6.2 10E3/UL (ref 4–11)
WBC #/AREA URNS AUTO: ABNORMAL /HPF

## 2025-05-12 PROCEDURE — 84443 ASSAY THYROID STIM HORMONE: CPT

## 2025-05-12 PROCEDURE — 82785 ASSAY OF IGE: CPT

## 2025-05-12 PROCEDURE — 36415 COLL VENOUS BLD VENIPUNCTURE: CPT

## 2025-05-12 PROCEDURE — 81001 URINALYSIS AUTO W/SCOPE: CPT

## 2025-05-12 PROCEDURE — 86003 ALLG SPEC IGE CRUDE XTRC EA: CPT

## 2025-05-12 PROCEDURE — 85025 COMPLETE CBC W/AUTO DIFF WBC: CPT

## 2025-05-12 PROCEDURE — 80076 HEPATIC FUNCTION PANEL: CPT

## 2025-05-14 ENCOUNTER — PATIENT OUTREACH (OUTPATIENT)
Dept: CARE COORDINATION | Facility: CLINIC | Age: 50
End: 2025-05-14
Payer: COMMERCIAL

## 2025-05-14 LAB
A ALTERNATA IGE QN: <0.1 KU(A)/L
A FUMIGATUS IGE QN: <0.1 KU(A)/L
BERMUDA GRASS IGE QN: <0.1 KU(A)/L
C HERBARUM IGE QN: <0.1 KU(A)/L
CAT DANDER IGG QN: 0.4 KU(A)/L
CEDAR IGE QN: <0.1 KU(A)/L
COMMON RAGWEED IGE QN: <0.1 KU(A)/L
COTTONWOOD IGE QN: <0.1 KU(A)/L
D FARINAE IGE QN: <0.1 KU(A)/L
D PTERONYSS IGE QN: <0.1 KU(A)/L
DOG DANDER+EPITH IGE QN: <0.1 KU(A)/L
IGE SERPL-ACNC: 47 KU/L (ref 0–114)
MAPLE IGE QN: <0.1 KU(A)/L
MARSH ELDER IGE QN: <0.1 KU(A)/L
MOUSE URINE PROT IGE QN: <0.1 KU(A)/L
NETTLE IGE QN: <0.1 KU(A)/L
P NOTATUM IGE QN: <0.1 KU(A)/L
ROACH IGE QN: <0.1 KU(A)/L
SALTWORT IGE QN: <0.1 KU(A)/L
SILVER BIRCH IGE QN: <0.1 KU(A)/L
TIMOTHY IGE QN: <0.1 KU(A)/L
WHITE ASH IGE QN: <0.1 KU(A)/L
WHITE ELM IGE QN: <0.1 KU(A)/L
WHITE MULBERRY IGE QN: <0.1 KU(A)/L
WHITE OAK IGE QN: <0.1 KU(A)/L

## 2025-05-16 ENCOUNTER — RESULTS FOLLOW-UP (OUTPATIENT)
Dept: FAMILY MEDICINE | Facility: CLINIC | Age: 50
End: 2025-05-16
Payer: COMMERCIAL

## 2025-05-16 NOTE — RESULT ENCOUNTER NOTE
Dear Audra,   Your test results are all back -   -Normal red blood cell (hgb) levels, normal white blood cell count and normal platelet levels.  -TSH (thyroid stimulating hormone) level is normal which indicates appropriate thyroid replacement dosing.  ADVISE: continuing same replacement dose and rechecking this in 12 months.  -Urine is normal.  Some bacteria but this can be normal in women.  Hepatic - liver tests are normal  Let us know if you have any questions.  -Michelle Caldwell, DO

## 2025-05-19 NOTE — TELEPHONE ENCOUNTER
PN,  Please see below MyChart message and advise.  Last visit 05/06/2025, annual 02/21/2025  Pharmacy pended  Patient currently taking semaglutide 5mg  Could advise visit to discuss?  Thanks,  Cheryl KING RN

## 2025-05-20 NOTE — TELEPHONE ENCOUNTER
Class 1 obesity due to excess calories with serious comorbidity in adult, unspecified BMI  Reviewed current weight and using semaglutide -   She is not losing weight so one more dose adjustment   If persists wonders about zepbound  She will send an evisit to get prescription send to [facility] direct pharmacy        Please have her send this information in evisit  Thanks  PN

## 2025-05-21 ENCOUNTER — E-VISIT (OUTPATIENT)
Dept: FAMILY MEDICINE | Facility: CLINIC | Age: 50
End: 2025-05-21
Payer: COMMERCIAL

## 2025-05-21 DIAGNOSIS — E66.811 CLASS 1 OBESITY DUE TO EXCESS CALORIES WITH SERIOUS COMORBIDITY IN ADULT, UNSPECIFIED BMI: Primary | ICD-10-CM

## 2025-05-21 DIAGNOSIS — E66.09 CLASS 1 OBESITY DUE TO EXCESS CALORIES WITH SERIOUS COMORBIDITY IN ADULT, UNSPECIFIED BMI: Primary | ICD-10-CM

## 2025-05-21 NOTE — TELEPHONE ENCOUNTER
Provider E-Visit time total (minutes): {AMB PROVIDER EVISIT TOTAL MINUTES LIST:056515}    {Please send feedback regarding eVisits to [email or web address]

## 2025-05-30 ENCOUNTER — TRANSFERRED RECORDS (OUTPATIENT)
Dept: HEALTH INFORMATION MANAGEMENT | Facility: CLINIC | Age: 50
End: 2025-05-30
Payer: COMMERCIAL

## 2025-06-17 ENCOUNTER — TELEPHONE (OUTPATIENT)
Dept: FAMILY MEDICINE | Facility: CLINIC | Age: 50
End: 2025-06-17

## 2025-06-17 ENCOUNTER — MYC MEDICAL ADVICE (OUTPATIENT)
Dept: FAMILY MEDICINE | Facility: CLINIC | Age: 50
End: 2025-06-17

## 2025-06-17 ENCOUNTER — VIRTUAL VISIT (OUTPATIENT)
Dept: FAMILY MEDICINE | Facility: CLINIC | Age: 50
End: 2025-06-17
Payer: COMMERCIAL

## 2025-06-17 DIAGNOSIS — J11.1 INFLUENZA WITH RESPIRATORY MANIFESTATION OTHER THAN PNEUMONIA: Primary | ICD-10-CM

## 2025-06-17 DIAGNOSIS — E66.09 CLASS 1 OBESITY DUE TO EXCESS CALORIES WITH SERIOUS COMORBIDITY IN ADULT, UNSPECIFIED BMI: Primary | ICD-10-CM

## 2025-06-17 DIAGNOSIS — E66.811 CLASS 1 OBESITY DUE TO EXCESS CALORIES WITH SERIOUS COMORBIDITY IN ADULT, UNSPECIFIED BMI: Primary | ICD-10-CM

## 2025-06-17 PROCEDURE — 98005 SYNCH AUDIO-VIDEO EST LOW 20: CPT | Performed by: PHYSICIAN ASSISTANT

## 2025-06-17 RX ORDER — CEFDINIR 300 MG/1
300 CAPSULE ORAL 2 TIMES DAILY
Qty: 20 CAPSULE | Refills: 0 | Status: SHIPPED | OUTPATIENT
Start: 2025-06-17 | End: 2025-06-27

## 2025-06-17 NOTE — TELEPHONE ENCOUNTER
Patient called back and is scheduled At 5:30 with MP For a VV  [facility] Unit Coordinator

## 2025-06-17 NOTE — TELEPHONE ENCOUNTER
Left message for patient to call [facility] back   VV available at 530 today with MP - spot on hold    Thanks,  Safia MCARTHUR RN

## 2025-06-17 NOTE — TELEPHONE ENCOUNTER
PN,  Please see below MyChart message and advise.  Pended next dose up if approved    Thanks,  Cheryl KING RN

## 2025-06-17 NOTE — TELEPHONE ENCOUNTER
Pt calling stating her son was seen Friday and diagnosed with Non-typeable Haemophilus influenzae and was put on an antibiotic. Yesterday she started having symptoms including headache, congestion, sore throat, fatigue, and a low grade fever and is wondering if she should be put on an antibiotic as well with an appointment or if she would still need an appointment.    Thanks!  Prasanna ROSAS RN   [facility]

## 2025-06-17 NOTE — PROGRESS NOTES
Audra is a 50 year old who is being evaluated via a billable video visit.    How would you like to obtain your AVS? MyChart  If the video visit is dropped, the invitation should be resent by: Text to cell phone: [phone]  Will anyone else be joining your video visit? No      Assessment & Plan     Influenza with respiratory manifestation other than pneumonia  Over the counter and supportive care discussed with patient and prescription for oral antibiotic to presumptively treat for bacterial component. Son with similar symptoms improving with recent antibiotics as well. Return to clinic with any worsening or changes in symptoms and follow up with PCP for routine care.   - cefdinir (OMNICEF) 300 MG capsule; Take 1 capsule (300 mg) by mouth 2 times daily for 10 days.      Follow-up  No follow-ups on file.    Subjective   Audra is a 50 year old, presenting for the following health issues:  URI (Son has a bacterial infection. HA,ST and cough also fatigue)    HPI      Acute Illness  Acute illness concerns: headache, congestion, sore throat, fatigue, and a low grade fever  Onset/Duration: 1 day  Symptoms:  Fever: YES  Chills/Sweats: YES  Headache (location?): YES  Sinus Pressure: YES  Conjunctivitis:  YES  Ear Pain: YES   Rhinorrhea: No  Congestion: No  Sore Throat: YES  Cough: YES  Wheeze: No  Decreased Appetite: YES  Nausea: No  Vomiting: No  Diarrhea: No  Dysuria/Freq.: No  Dysuria or Hematuria: No  Fatigue/Achiness: YES  Sick/Strep Exposure: YES  Therapies tried and outcome: aaliyah    Son was seen Friday and diagnosed with non-typeable Haemophilus influenzae and was put on an antibiotic. Yesterday she started having symptoms including headache, congestion, sore throat, fatigue, and a low grade fever and is wondering if she should be put on an antibiotic as well       Review of Systems  Constitutional, HEENT, cardiovascular, pulmonary, gi and gu systems are negative, except as otherwise noted.      Objective            Vitals:  No vitals were obtained today due to virtual visit.    Physical Exam   GENERAL: alert and no distress  EYES: Eyes grossly normal to inspection.  No discharge or erythema, or obvious scleral/conjunctival abnormalities.  RESP: No audible wheeze, cough, or visible cyanosis.    SKIN: Visible skin clear. No significant rash, abnormal pigmentation or lesions.  NEURO: Cranial nerves grossly intact.  Mentation and speech appropriate for age.  PSYCH: Appropriate affect, tone, and pace of words          Video-Visit Details    Type of service:  Video Visit   Originating Location (pt. Location): Home    Distant Location (provider location):  On-site  Platform used for Video Visit: Felice  Signed Electronically by: Yoni Nance PA-C

## 2025-06-19 ENCOUNTER — OFFICE VISIT (OUTPATIENT)
Dept: UROLOGY | Facility: CLINIC | Age: 50
End: 2025-06-19
Attending: FAMILY MEDICINE
Payer: COMMERCIAL

## 2025-06-19 VITALS — DIASTOLIC BLOOD PRESSURE: 74 MMHG | HEART RATE: 60 BPM | SYSTOLIC BLOOD PRESSURE: 123 MMHG | OXYGEN SATURATION: 100 %

## 2025-06-19 DIAGNOSIS — C67.9 MALIGNANT NEOPLASM OF URINARY BLADDER, UNSPECIFIED SITE (H): Primary | ICD-10-CM

## 2025-06-19 LAB
ALBUMIN UR-MCNC: NEGATIVE MG/DL
APPEARANCE UR: CLEAR
BILIRUB UR QL STRIP: NEGATIVE
COLOR UR AUTO: YELLOW
GLUCOSE UR STRIP-MCNC: NEGATIVE MG/DL
HGB UR QL STRIP: ABNORMAL
KETONES UR STRIP-MCNC: ABNORMAL MG/DL
LEUKOCYTE ESTERASE UR QL STRIP: NEGATIVE
NITRATE UR QL: NEGATIVE
PH UR STRIP: 6 [PH] (ref 5–7)
SP GR UR STRIP: 1.02 (ref 1–1.03)
UROBILINOGEN UR STRIP-ACNC: 1 E.U./DL

## 2025-06-19 NOTE — PROGRESS NOTES
OFFICE CYSTOSCOPY 6/19/2025    Pre-procedure diagnosis:  Bladder Cancer  Post-procedure diagnosis: Normal Cystoscopy  Procedure performed:  Cystourethroscopy  Surgeon:    Delano Aragon MD   Anesthesia:    Local    Indications for procedure:   Audra Parada is a 50 year old female with a history of bladder cancer, resected 2007. No recurrence since..    Description of procedure:   After fully informed, voluntary consent was obtained, the patient was brought into the procedure room, identified and placed in a dorsal lithotomy position on the cystoscopy table.  The vagina/introitus were prepped with betadine and draped in a sterile fashion.  Urojet lidocaine gel was introduced.  A 15F flexible cystoscope was inserted into the urethra, and the bladder and urethra were examined in a systematic manner.  The patient tolerated the procedure well and there were no complications.      Findings:  External exam revealed no cystocele and no rectocele.   Cystoscopy then showed the urethra to be normal in appearance with normal coaptation. The bladder itself was completely surveyed.  The ureteric orifices were normal in position and number and effluxing clear urine.  There was mild trabeculation.  There were no neoplasms, stones, or diverticula identified. Cystitis cystica noted adjacent to bladder neck. No suspicious lesions.     Assessment/Plan:   Audra Parada is a 50 year old female with a history of bladder cancer, now with normal findings on cystoscopy.      Delano Aragon MD  Urology  [facility]

## 2025-06-19 NOTE — NURSING NOTE
Pt is coming to us from [place].  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    Last tumor removed in 2007 at [place] [facility].    Pt denies pain or gross hem.    Last cysto was at [place] 2-15-23      DHamida Armenta CMA

## 2025-06-19 NOTE — PATIENT INSTRUCTIONS

## 2025-06-19 NOTE — LETTER
6/19/2025       RE: Audra Parada  [address]  [facility] [address]     Dear Colleague,    Thank you for referring your patient, Audra Parada, to the [facility]OLOG[facility] [place] at [facility]. Please see a copy of my visit note below.          Chief Complaint:   Bladder Cancer         Consult or Referral:     Audra Parada is a 50 year old female seen at the request of Javi.         History of Present Illness:    Audra Parada is a very pleasant 50 year old female who presents with a history of bladder cancer. She was diagnosed in 2007 and treated with low-grade disease at [facility]. No recurrences since. Transitioning care for ongoing surveillance. No recent hematuria or dysuria.     CT Urogram 2/14/2023  1. No evidence of recurrent or metastatic urothelial carcinoma in the abdomen and pelvis.   2. Interval decreased burden of bilateral nonobstructing calyceal tip stones since 02/13/2019.   3. Interval near resolution of inflammatory change in the terminal ileum.          Past Medical History:     Past Medical History:   Diagnosis Date     Abnormal glandular Papanicolaou smear of cervix 02/2000     Bladder cancer (H) 2007    removed tumor, chemo wash in bladder     Depression     hx - related to vasculitis - treated with wellbutrin     Depressive disorder      Lateral epicondylitis of left elbow 07/12/2023     Mild intermittent asthma with acute exacerbation      Neoplasm of unspecified nature of bladder 08/31/2007    non-invasive Gr I bladder tumor     Other specified viral warts      Unspecified hypothyroidism 02/1998     Varicella without mention of complication     childhood          Past Surgical History:     Past Surgical History:   Procedure Laterality Date     ABDOMEN SURGERY       BIOPSY       c sections       COLONOSCOPY N/A 01/18/2023    Procedure: COLONOSCOPY;  Surgeon: Krista Bauer MD;  Location:  GI     CYSTOSCOPY       [facility]  INCISE/FULGUR LESN BLADDER  08/31/2007    non-invasive Gr I bladder tumor     [facility] NONSPECIFIC PROCEDURE  03/2003    Colposcopy          Medications     Current Outpatient Medications   Medication Sig Dispense Refill     albuterol (PROAIR HFA/PROVENTIL HFA/VENTOLIN HFA) 108 (90 Base) MCG/ACT inhaler Inhale 2 puffs into the lungs every 6 hours as needed for shortness of breath / dyspnea or wheezing 8 g 4     cefdinir (OMNICEF) 300 MG capsule Take 1 capsule (300 mg) by mouth 2 times daily for 10 days. 20 capsule 0     clindamycin (CLINDAMAX) 1 % external gel Apply topically 2 times daily       fluticasone (FLOVENT DISKUS) 100 MCG/BLIST inhaler Inhale 1 puff into the lungs 2 times daily 60 each 3     levothyroxine (SYNTHROID/LEVOTHROID) 137 MCG tablet Take 1 tablet (137 mcg) by mouth daily. Except on Sundays only take 1/2 tablet 90 tablet 3     Multiple Vitamins-Minerals (MULTIVITAMIN ADULT PO)        Omega-3 Fatty Acids (OMEGA-3 FISH OIL PO)        Risankizumab-rzaa (SKYRIZI PEN SC)        sertraline (ZOLOFT) 50 MG tablet Take 1 tablet (50 mg) by mouth daily. 90 tablet 3     tirzepatide-weight management (ZEPBOUND) 7.5 MG/0.5ML vial Inject 0.5 mLs (7.5 mg) subcutaneously once a week. 2 mL 3     UNABLE TO FIND MEDICATION NAME: Nystop top powder - Psoriasis       vitamin D3 (CHOLECALCIFEROL) 2000 units tablet Take 1 tablet by mouth daily.       No current facility-administered medications for this visit.          Family History:     Family History   Problem Relation Age of Onset     Hypertension Mother      Diabetes Father         adult onset-using insulin     Chronic Obstructive Pulmonary Disease Father      C.A.D. Maternal Grandmother         CABG at 63     Alcohol/Drug Maternal Grandfather      C.A.D. Paternal Grandmother         MI--70's-80's     Respiratory Paternal Grandfather         asthma          Social History:     Social History     Socioeconomic History     Marital status:      Spouse name: Not on  file     Number of children: 0     Years of education: 16     Highest education level: Not on file   Occupational History     Occupation: marketing     Comment: [employer]   Tobacco Use     Smoking status: Never     Smokeless tobacco: Never   Vaping Use     Vaping status: Never Used   Substance and Sexual Activity     Alcohol use: Yes     Comment: 3-5/ week     Drug use: No     Sexual activity: Yes     Partners: Male     Birth control/protection: Female Surgical   Other Topics Concern      Service No     Blood Transfusions No     Caffeine Concern No     Occupational Exposure No     Hobby Hazards No     Sleep Concern No     Stress Concern Yes     Comment: workplace     Weight Concern No     Special Diet No     Back Care No     Exercise No     Bike Helmet No     Seat Belt No     Self-Exams No     Parent/sibling w/ CABG, MI or angioplasty before 65F 55M? No   Social History Narrative    Social Documentation:        Balanced Diet: YES    Calcium intake: 2-3 serving of dairy per day plus a MVI    Caffeine: none per day    Exercise:  type of activity treadmill, walking, wt lifting with upper body;  3-4 times per week    Sunscreen: Yes    Seatbelts:  Yes    Self Breast Exam:  Yes    Self Testicular Exam: No - n/a    Physical/Emotional/Sexual Abuse: seeing pyschiatrist for depression- doing well on wellbutrin    Do you feel safe in your environment? Yes        Cholesterol screen up to date: No - last chol in 2002- no fasting test done- would like tested today    Eye Exam up to date: Yes    Dental Exam up to date: Yes    Pap smear up to date: Yes    Mammogram up to date: Does Not Apply    Dexa Scan up to date: Does Not Apply    Colonoscopy up to date: Does Not Apply    Immunizations up to date: Yes-Td in 1998- Hep b completed.      Glucose screen if over 40:  No - N/A--but would like to be tested due to father with DM                                 Social Drivers of Health     Financial Resource Strain:  Low Risk  (2/21/2025)    Financial Resource Strain      Within the past 12 months, have you or your family members you live with been unable to get utilities (heat, electricity) when it was really needed?: No   Food Insecurity: Low Risk  (2/21/2025)    Food Insecurity      Within the past 12 months, did you worry that your food would run out before you got money to buy more?: No      Within the past 12 months, did the food you bought just not last and you didn t have money to get more?: No   Transportation Needs: Low Risk  (2/21/2025)    Transportation Needs      Within the past 12 months, has lack of transportation kept you from medical appointments, getting your medicines, non-medical meetings or appointments, work, or from getting things that you need?: No   Physical Activity: Unknown (2/21/2025)    Exercise Vital Sign      Days of Exercise per Week: 2 days      Minutes of Exercise per Session: Not on file   Stress: No Stress Concern Present (2/21/2025)    [place] of [employer]ational [employer] - Occupational Stress Questionnaire      Feeling of Stress : Only a little   Social Connections: Unknown (2/21/2025)    Social Connection and Isolation Panel [NHANES]      Frequency of Communication with Friends and Family: Not on file      Frequency of Social Gatherings with Friends and Family: Once a week      Attends Hoahaoism Services: Not on file      Active Member of Clubs or Organizations: Not on file      Attends Club or Organization Meetings: Not on file      Marital Status: Not on file   Interpersonal Safety: Low Risk  (2/21/2025)    Interpersonal Safety      Do you feel physically and emotionally safe where you currently live?: Yes      Within the past 12 months, have you been hit, slapped, kicked or otherwise physically hurt by someone?: No      Within the past 12 months, have you been humiliated or emotionally abused in other ways by your partner or ex-partner?: No   Housing Stability: High Risk (2/21/2025)     Housing Stability      Do you have housing? : No      Are you worried about losing your housing?: No            Allergies:   Shingrix [zoster vac recomb adjuvanted] and Penicillin g         Review of Systems:  From intake questionnaire     Skin: negative  Eyes: negative  Ears/Nose/Throat: negative  Respiratory: No shortness of breath, dyspnea on exertion, cough, or hemoptysis  Cardiovascular: No chest pain or palpitations  Gastrointestinal: negative; no nausea/vomiting, constipation or diarrhea  Genitourinary: as per HPI  Musculoskeletal: negative  Neurologic: negative  Psychiatric: negative  Hematologic/Lymphatic/Immunologic: negative  Endocrine: negative         Physical Exam:     Patient is a 50 year old  female   Vitals: Blood pressure 123/74, pulse 60, SpO2 100%, not currently breastfeeding.  Constitutional: There is no height or weight on file to calculate BMI.  Alert, no acute distress, oriented, conversant  Eyes: no scleral icterus; extraocular muscles intact, moist conjunctivae  Respiratory: no respiratory distress, or pursed lip breathing  Cardiovascular: pulses strong and intact; no obvious jugular venous distension present  Gastrointestinal: soft, nontender, no organomegaly or masses,   Musculoskeletal: extremities normal, no peripheral edema  Skin: no suspicious lesions or rashes  Neuro: Alert, oriented, speech and mentation normal  Psych: affect and mood normal, alert and oriented to person, place and time      Labs and Pathology:    I reviewed all applicable laboratory and pathology data and went over findings with patient  Significant for   Lab Results   Component Value Date    CR 0.85 02/21/2025    CR 0.84 02/16/2024    CR 0.76 05/19/2023    CR 0.84 02/10/2023    CR 0.73 03/17/2022    CR 0.81 08/31/2021    CR 0.67 07/08/2019    CR 0.81 01/23/2019    CR 0.73 11/17/2017    CR 0.80 09/17/2014    CR 0.75 09/06/2013    CR 0.62 11/26/2012    CR 0.7 05/15/2012    CR 0.90 07/25/2007    CR 0.90 06/29/2007        Imaging:    The following imaging exams were independently viewed and interpreted by me and discussed with patient:  CT Urogram      Outside and Past Medical records:    Review of prior external note(s) from - [facility] information from [place] reviewed  Review of the result(s) of each unique test - CT, creat, pathology         Assessment and Plan:     50 year old female with history of LG Ta urothelial carcinoma of the bladder resected 2007. No recurrence noted today. She would like to continue surveillance every 2 years. No indication for ongoing upper tract imaging unless new symptoms develop.     Plan:  Cysto today  Cytology today  Follow-up 2 years with office cysto    Orders  Orders Placed This Encounter   Procedures     CYSTOURETHROSCOPY (99430)     UA without Microscopic [[id number]]     30 total minutes spent on the date of the encounter including direct interaction with the patient, performing chart review, documentation and further activities as noted above. This time was in addition to time spent performing cystoscopy, which was completed as a component of today's new patient consultation.    Delano Aragon MD  Urology  [facility]            OFFICE CYSTOSCOPY 6/19/2025    Pre-procedure diagnosis:  Bladder Cancer  Post-procedure diagnosis: Normal Cystoscopy  Procedure performed:  Cystourethroscopy  Surgeon:    Delano Aragon MD   Anesthesia:    Local    Indications for procedure:   Audra Parada is a 50 year old female with a history of bladder cancer, resected 2007. No recurrence since..    Description of procedure:   After fully informed, voluntary consent was obtained, the patient was brought into the procedure room, identified and placed in a dorsal lithotomy position on the cystoscopy table.  The vagina/introitus were prepped with betadine and draped in a sterile fashion.  Urojet lidocaine gel was introduced.  A 15F flexible cystoscope was inserted into the urethra, and  the bladder and urethra were examined in a systematic manner.  The patient tolerated the procedure well and there were no complications.      Findings:  External exam revealed no cystocele and no rectocele.   Cystoscopy then showed the urethra to be normal in appearance with normal coaptation. The bladder itself was completely surveyed.  The ureteric orifices were normal in position and number and effluxing clear urine.  There was mild trabeculation.  There were no neoplasms, stones, or diverticula identified. Cystitis cystica noted adjacent to bladder neck. No suspicious lesions.     Assessment/Plan:   Audra Parada is a 50 year old female with a history of bladder cancer, now with normal findings on cystoscopy.      Delano Aragon MD  Urology  [facility]        Again, thank you for allowing me to participate in the care of your patient.      Sincerely,    Delano Aragon MD

## 2025-06-19 NOTE — NURSING NOTE
Pt has signed the consent form stating that we will be doing a CYSTOSCOPY (with or without stent removal) today, and that it is the correct procedure. I verbally confirmed the patient s identity using two indicators, relevant allergies, and that the correct equipment was available. Post-op information given to the pt as needed at check-out.  KULWINDER Armenta CMA

## 2025-06-19 NOTE — PROGRESS NOTES
Chief Complaint:   Bladder Cancer         Consult or Referral:     Audra Parada is a 50 year old female seen at the request of Javi.         History of Present Illness:    Audra Parada is a very pleasant 50 year old female who presents with a history of bladder cancer. She was diagnosed in 2007 and treated with low-grade disease at [facility]. No recurrences since. Transitioning care for ongoing surveillance. No recent hematuria or dysuria.     CT Urogram 2/14/2023  1. No evidence of recurrent or metastatic urothelial carcinoma in the abdomen and pelvis.   2. Interval decreased burden of bilateral nonobstructing calyceal tip stones since 02/13/2019.   3. Interval near resolution of inflammatory change in the terminal ileum.          Past Medical History:     Past Medical History:   Diagnosis Date    Abnormal glandular Papanicolaou smear of cervix 02/2000    Bladder cancer (H) 2007    removed tumor, chemo wash in bladder    Depression     hx - related to vasculitis - treated with wellbutrin    Depressive disorder     Lateral epicondylitis of left elbow 07/12/2023    Mild intermittent asthma with acute exacerbation     Neoplasm of unspecified nature of bladder 08/31/2007    non-invasive Gr I bladder tumor    Other specified viral warts     Unspecified hypothyroidism 02/1998    Varicella without mention of complication     childhood          Past Surgical History:     Past Surgical History:   Procedure Laterality Date    ABDOMEN SURGERY      BIOPSY      c sections      COLONOSCOPY N/A 01/18/2023    Procedure: COLONOSCOPY;  Surgeon: Krista Bauer MD;  Location:  GI    CYSTOSCOPY      [facility] INCISE/FULGUR LESN BLADDER  08/31/2007    non-invasive Gr I bladder tumor    Z NONSPECIFIC PROCEDURE  03/2003    Colposcopy          Medications     Current Outpatient Medications   Medication Sig Dispense Refill    albuterol (PROAIR HFA/PROVENTIL HFA/VENTOLIN HFA) 108 (90 Base) MCG/ACT inhaler Inhale 2 puffs into the  Aware  H/H has been stable without any signs of overt loss.       lungs every 6 hours as needed for shortness of breath / dyspnea or wheezing 8 g 4    cefdinir (OMNICEF) 300 MG capsule Take 1 capsule (300 mg) by mouth 2 times daily for 10 days. 20 capsule 0    clindamycin (CLINDAMAX) 1 % external gel Apply topically 2 times daily      fluticasone (FLOVENT DISKUS) 100 MCG/BLIST inhaler Inhale 1 puff into the lungs 2 times daily 60 each 3    levothyroxine (SYNTHROID/LEVOTHROID) 137 MCG tablet Take 1 tablet (137 mcg) by mouth daily. Except on Sundays only take 1/2 tablet 90 tablet 3    Multiple Vitamins-Minerals (MULTIVITAMIN ADULT PO)       Omega-3 Fatty Acids (OMEGA-3 FISH OIL PO)       Risankizumab-rzaa (SKYRIZI PEN SC)       sertraline (ZOLOFT) 50 MG tablet Take 1 tablet (50 mg) by mouth daily. 90 tablet 3    tirzepatide-weight management (ZEPBOUND) 7.5 MG/0.5ML vial Inject 0.5 mLs (7.5 mg) subcutaneously once a week. 2 mL 3    UNABLE TO FIND MEDICATION NAME: Nystop top powder - Psoriasis      vitamin D3 (CHOLECALCIFEROL) 2000 units tablet Take 1 tablet by mouth daily.       No current facility-administered medications for this visit.          Family History:     Family History   Problem Relation Age of Onset    Hypertension Mother     Diabetes Father         adult onset-using insulin    Chronic Obstructive Pulmonary Disease Father     C.A.D. Maternal Grandmother         CABG at 63    Alcohol/Drug Maternal Grandfather     C.A.D. Paternal Grandmother         MI--70's-80's    Respiratory Paternal Grandfather         asthma          Social History:     Social History     Socioeconomic History    Marital status:      Spouse name: Not on file    Number of children: 0    Years of education: 16    Highest education level: Not on file   Occupational History    Occupation: marketing     Comment: [employer]   Tobacco Use    Smoking status: Never    Smokeless tobacco: Never   Vaping Use    Vaping status: Never Used   Substance and Sexual Activity    Alcohol use: Yes      Comment: 3-5/ week    Drug use: No    Sexual activity: Yes     Partners: Male     Birth control/protection: Female Surgical   Other Topics Concern     Service No    Blood Transfusions No    Caffeine Concern No    Occupational Exposure No    Hobby Hazards No    Sleep Concern No    Stress Concern Yes     Comment: workplace    Weight Concern No    Special Diet No    Back Care No    Exercise No    Bike Helmet No    Seat Belt No    Self-Exams No    Parent/sibling w/ CABG, MI or angioplasty before 65F 55M? No   Social History Narrative    Social Documentation:        Balanced Diet: YES    Calcium intake: 2-3 serving of dairy per day plus a MVI    Caffeine: none per day    Exercise:  type of activity treadmill, walking, wt lifting with upper body;  3-4 times per week    Sunscreen: Yes    Seatbelts:  Yes    Self Breast Exam:  Yes    Self Testicular Exam: No - n/a    Physical/Emotional/Sexual Abuse: seeing pyschiatrist for depression- doing well on wellbutrin    Do you feel safe in your environment? Yes        Cholesterol screen up to date: No - last chol in 2002- no fasting test done- would like tested today    Eye Exam up to date: Yes    Dental Exam up to date: Yes    Pap smear up to date: Yes    Mammogram up to date: Does Not Apply    Dexa Scan up to date: Does Not Apply    Colonoscopy up to date: Does Not Apply    Immunizations up to date: Yes-Td in 1998- Hep b completed.      Glucose screen if over 40:  No - N/A--but would like to be tested due to father with DM                                 Social Drivers of Health     Financial Resource Strain: Low Risk  (2/21/2025)    Financial Resource Strain     Within the past 12 months, have you or your family members you live with been unable to get utilities (heat, electricity) when it was really needed?: No   Food Insecurity: Low Risk  (2/21/2025)    Food Insecurity     Within the past 12 months, did you worry that your food would run out before you got money to buy  more?: No     Within the past 12 months, did the food you bought just not last and you didn t have money to get more?: No   Transportation Needs: Low Risk  (2/21/2025)    Transportation Needs     Within the past 12 months, has lack of transportation kept you from medical appointments, getting your medicines, non-medical meetings or appointments, work, or from getting things that you need?: No   Physical Activity: Unknown (2/21/2025)    Exercise Vital Sign     Days of Exercise per Week: 2 days     Minutes of Exercise per Session: Not on file   Stress: No Stress Concern Present (2/21/2025)    [place] of Occupational Health - Occupational Stress Questionnaire     Feeling of Stress : Only a little   Social Connections: Unknown (2/21/2025)    Social Connection and Isolation Panel [NHANES]     Frequency of Communication with Friends and Family: Not on file     Frequency of Social Gatherings with Friends and Family: Once a week     Attends Adventism Services: Not on file     Active Member of Clubs or Organizations: Not on file     Attends Club or Organization Meetings: Not on file     Marital Status: Not on file   Interpersonal Safety: Low Risk  (2/21/2025)    Interpersonal Safety     Do you feel physically and emotionally safe where you currently live?: Yes     Within the past 12 months, have you been hit, slapped, kicked or otherwise physically hurt by someone?: No     Within the past 12 months, have you been humiliated or emotionally abused in other ways by your partner or ex-partner?: No   Housing Stability: High Risk (2/21/2025)    Housing Stability     Do you have housing? : No     Are you worried about losing your housing?: No            Allergies:   Shingrix [zoster vac recomb adjuvanted] and Penicillin g         Review of Systems:  From intake questionnaire     Skin: negative  Eyes: negative  Ears/Nose/Throat: negative  Respiratory: No shortness of breath, dyspnea on exertion, cough, or  hemoptysis  Cardiovascular: No chest pain or palpitations  Gastrointestinal: negative; no nausea/vomiting, constipation or diarrhea  Genitourinary: as per HPI  Musculoskeletal: negative  Neurologic: negative  Psychiatric: negative  Hematologic/Lymphatic/Immunologic: negative  Endocrine: negative         Physical Exam:     Patient is a 50 year old  female   Vitals: Blood pressure 123/74, pulse 60, SpO2 100%, not currently breastfeeding.  Constitutional: There is no height or weight on file to calculate BMI.  Alert, no acute distress, oriented, conversant  Eyes: no scleral icterus; extraocular muscles intact, moist conjunctivae  Respiratory: no respiratory distress, or pursed lip breathing  Cardiovascular: pulses strong and intact; no obvious jugular venous distension present  Gastrointestinal: soft, nontender, no organomegaly or masses,   Musculoskeletal: extremities normal, no peripheral edema  Skin: no suspicious lesions or rashes  Neuro: Alert, oriented, speech and mentation normal  Psych: affect and mood normal, alert and oriented to person, place and time      Labs and Pathology:    I reviewed all applicable laboratory and pathology data and went over findings with patient  Significant for   Lab Results   Component Value Date    CR 0.85 02/21/2025    CR 0.84 02/16/2024    CR 0.76 05/19/2023    CR 0.84 02/10/2023    CR 0.73 03/17/2022    CR 0.81 08/31/2021    CR 0.67 07/08/2019    CR 0.81 01/23/2019    CR 0.73 11/17/2017    CR 0.80 09/17/2014    CR 0.75 09/06/2013    CR 0.62 11/26/2012    CR 0.7 05/15/2012    CR 0.90 07/25/2007    CR 0.90 06/29/2007       Imaging:    The following imaging exams were independently viewed and interpreted by me and discussed with patient:  CT Urogram      Outside and Past Medical records:    [facility] [place] reviewed  Review of the result(s) of each unique test - CT, creat, pathology         Assessment and Plan:     50 year  old female with history of LG Ta urothelial carcinoma of the bladder resected 2007. No recurrence noted today. She would like to continue surveillance every 2 years. No indication for ongoing upper tract imaging unless new symptoms develop.     Plan:  Cysto today  Cytology today  Follow-up 2 years with office cysto    Orders  Orders Placed This Encounter   Procedures    CYSTOURETHROSCOPY ([id number])    UA without Microscopic [[id number]]     30 total minutes spent on the date of the encounter including direct interaction with the patient, performing chart review, documentation and further activities as noted above. This time was in addition to time spent performing cystoscopy, which was completed as a component of today's new patient consultation.    Delano Aragon MD  Urology  [facility]

## 2025-06-23 LAB
PATH REPORT.COMMENTS IMP SPEC: NORMAL
PATH REPORT.FINAL DX SPEC: NORMAL
PATH REPORT.GROSS SPEC: NORMAL
PATH REPORT.MICROSCOPIC SPEC OTHER STN: NORMAL
PATH REPORT.RELEVANT HX SPEC: NORMAL

## 2025-07-07 NOTE — LETTER
6/28/2023         RE: Audra Parada  [address]  [facility] [address]        Dear Colleague,    Thank you for referring your patient, Audra Parada, to the [facility] [place]. Please see a copy of my visit note below.    Patient is a  48   year old who is being evaluated via a billable telephone visit.      What phone number would you like to be contacted at? CELL  How would you like to obtain your AVS? MYCHART        Subjective   Patient is a  48   year old who presents by phone call visit for the following:     HPI   F/u for left elbow pain  Medrol pack resolved her symptoms  But then started to return  Has started MObic and continued HEP  Still having some discomfort with activities, but better    Review of Systems   Constitutional, HEENT, cardiovascular, pulmonary, gi and gu systems are negative, except as otherwise noted.      Objective           Vitals:  No vitals were obtained today due to virtual visit.    Physical Exam   healthy, alert and no distress  PSYCH: Alert and oriented times 3; coherent speech, normal   rate and volume, able to articulate logical thoughts, able   to abstract reason, no tangential thoughts, no hallucinations   or delusions  His affect is normal  RESP: No cough, no audible wheezing, able to talk in full sentences  Remainder of exam unable to be completed due to telephone visits    Assessment/Plan  47 yo female with left lateral epicondylitis, not resolved    I independently reviewed the following imaging studies and discussed with patient:  Left elbow xray; shows no significant arthritis  Discussed and ordered hand therapy  F/u in 3-4 weeks  Cont. mobic   And topical otcs  Start hand therapy asap          Phone call duration: 20 minutes  Phone call start: 910am  Phone call end: 930am  Dr Joseph      Again, thank you for allowing me to participate in the care of your patient.        Sincerely,        Ravindra Joseph MD    
    6/28/2023         RE: Audra Parada  [address]  [facility] [address]        Dear Colleague,    Thank you for referring your patient, Audra Parada, to the [facility] [place]. Please see a copy of my visit note below.    Patient is a  48   year old who is being evaluated via a billable telephone visit.      What phone number would you like to be contacted at? CELL  How would you like to obtain your AVS? MYCHART        Subjective   Patient is a  48   year old who presents by phone call visit for the following:     HPI   F/u for left elbow pain  Medrol pack resolved her symptoms  But then started to return  Has started MObic and continued HEP  Still having some discomfort with activities, but better    Review of Systems   Constitutional, HEENT, cardiovascular, pulmonary, gi and gu systems are negative, except as otherwise noted.      Objective           Vitals:  No vitals were obtained today due to virtual visit.    Physical Exam   healthy, alert and no distress  PSYCH: Alert and oriented times 3; coherent speech, normal   rate and volume, able to articulate logical thoughts, able   to abstract reason, no tangential thoughts, no hallucinations   or delusions  His affect is normal  RESP: No cough, no audible wheezing, able to talk in full sentences  Remainder of exam unable to be completed due to telephone visits    Assessment/Plan  49 yo female with left lateral epicondylitis, not resolved    I independently reviewed the following imaging studies and discussed with patient:  Left elbow xray; shows no significant arthritis  Discussed and ordered hand therapy  F/u in 3-4 weeks  Cont. mobic   And topical otcs  Start hand therapy asap          Phone call duration: 20 minutes  Phone call start: 910am  Phone call end: 930am  Dr Joseph      Again, thank you for allowing me to participate in the care of your patient.        Sincerely,        Ravindra Joseph MD    
,

## 2025-08-14 ENCOUNTER — MYC MEDICAL ADVICE (OUTPATIENT)
Dept: FAMILY MEDICINE | Facility: CLINIC | Age: 50
End: 2025-08-14
Payer: COMMERCIAL

## 2025-08-14 DIAGNOSIS — K52.9 COLITIS: Primary | ICD-10-CM

## 2025-08-25 ENCOUNTER — TELEPHONE (OUTPATIENT)
Dept: GASTROENTEROLOGY | Facility: CLINIC | Age: 50
End: 2025-08-25
Payer: COMMERCIAL

## (undated) RX ORDER — FENTANYL CITRATE 50 UG/ML
INJECTION, SOLUTION INTRAMUSCULAR; INTRAVENOUS
Status: DISPENSED
Start: 2023-01-18